# Patient Record
Sex: FEMALE | Race: WHITE | NOT HISPANIC OR LATINO | Employment: FULL TIME | URBAN - METROPOLITAN AREA
[De-identification: names, ages, dates, MRNs, and addresses within clinical notes are randomized per-mention and may not be internally consistent; named-entity substitution may affect disease eponyms.]

---

## 2017-01-19 ENCOUNTER — OFFICE VISIT (OUTPATIENT)
Dept: FAMILY MEDICINE | Facility: CLINIC | Age: 37
End: 2017-01-19
Payer: COMMERCIAL

## 2017-01-19 ENCOUNTER — RADIANT APPOINTMENT (OUTPATIENT)
Dept: GENERAL RADIOLOGY | Facility: CLINIC | Age: 37
End: 2017-01-19
Attending: PHYSICIAN ASSISTANT
Payer: COMMERCIAL

## 2017-01-19 VITALS
TEMPERATURE: 97.8 F | DIASTOLIC BLOOD PRESSURE: 76 MMHG | HEART RATE: 88 BPM | BODY MASS INDEX: 26.03 KG/M2 | WEIGHT: 162 LBS | OXYGEN SATURATION: 96 % | SYSTOLIC BLOOD PRESSURE: 118 MMHG | HEIGHT: 66 IN

## 2017-01-19 DIAGNOSIS — R05.9 COUGH: ICD-10-CM

## 2017-01-19 DIAGNOSIS — R05.9 COUGH: Primary | ICD-10-CM

## 2017-01-19 LAB
FLUAV+FLUBV AG SPEC QL: NORMAL
FLUAV+FLUBV AG SPEC QL: NORMAL
SPECIMEN SOURCE: NORMAL

## 2017-01-19 PROCEDURE — 99213 OFFICE O/P EST LOW 20 MIN: CPT | Performed by: PHYSICIAN ASSISTANT

## 2017-01-19 PROCEDURE — 87804 INFLUENZA ASSAY W/OPTIC: CPT | Performed by: PHYSICIAN ASSISTANT

## 2017-01-19 PROCEDURE — 71020 XR CHEST 2 VW: CPT

## 2017-01-19 RX ORDER — PREDNISONE 20 MG/1
40 TABLET ORAL DAILY
Qty: 10 TABLET | Refills: 0 | Status: SHIPPED | OUTPATIENT
Start: 2017-01-19 | End: 2017-01-24

## 2017-01-19 NOTE — NURSING NOTE
"Chief Complaint   Patient presents with     URI     Cough       Initial /76 mmHg  Pulse 88  Temp(Src) 97.8  F (36.6  C) (Oral)  Ht 5' 6\" (1.676 m)  Wt 162 lb (73.483 kg)  BMI 26.16 kg/m2  SpO2 96% Estimated body mass index is 26.16 kg/(m^2) as calculated from the following:    Height as of this encounter: 5' 6\" (1.676 m).    Weight as of this encounter: 162 lb (73.483 kg).  BP completed using cuff size: derian Escobar CMA      "

## 2017-01-19 NOTE — PROGRESS NOTES
"  SUBJECTIVE:                                                    Lashonda Rodriguez is a 37 year old female who presents to clinic today for the following health issues:      ENT Symptoms             Symptoms: cc Present Absent Comment   Fever/Chills  x  Chills, no fever.    Fatigue  x     Muscle Aches  x     Eye Irritation  x     Sneezing  x     Nasal Levi/Drg  x     Sinus Pressure/Pain  x     Loss of smell  x     Dental pain   x    Sore Throat  x  Started with that, improving.    Swollen Glands   x    Ear Pain/Fullness   x    Cough  x     Wheeze  x     Chest Pain   x    Shortness of breath  x     Rash   x    Other   x      Symptom duration:  5 days    Symptom severity:  moderate    Treatments tried:  mucinex -helping the cough   Contacts:  family      It was bad Monday and Tuesday. It feels like it is moving to her sinuses and lungs.       Problem list and histories reviewed & adjusted, as indicated.  Additional history: as documented    Problem list, Medication list, Allergies, and Medical/Social/Surgical histories reviewed in EPIC and updated as appropriate.    ROS:  Constitutional, HEENT, cardiovascular, pulmonary, gi and gu systems are negative, except as otherwise noted.    OBJECTIVE:                                                    /76 mmHg  Pulse 88  Temp(Src) 97.8  F (36.6  C) (Oral)  Ht 5' 6\" (1.676 m)  Wt 162 lb (73.483 kg)  BMI 26.16 kg/m2  SpO2 96%  Body mass index is 26.16 kg/(m^2).  GENERAL: healthy, alert and no distress  HENT: ear canals and TM's normal, but fluid bilaterally, nose and mouth without ulcers or lesions  NECK: no adenopathy, no asymmetry, masses, or scars and thyroid normal to palpation  RESP: faint expiratory wheezing in the bilateral lower lobes.   CV: regular rate and rhythm, normal S1 S2, no S3 or S4, no murmur, click or rub, no peripheral edema and peripheral pulses strong  MS: no gross musculoskeletal defects noted, no edema    Diagnostic Test Results:  Results " for orders placed or performed in visit on 01/19/17   Influenza A/B antigen   Result Value Ref Range    Influenza A/B Agn Specimen Nasal     Influenza A  NEG     Negative   Test results must be correlated with clinical data. If necessary, results   should be confirmed by a molecular assay or viral culture.      Influenza B  NEG     Negative   Test results must be correlated with clinical data. If necessary, results   should be confirmed by a molecular assay or viral culture.            ASSESSMENT/PLAN:                                                    1. Cough  Discussed viral nature of illness. Will try prednisone to help with cough and wheezing. If not improving in another 5-7 days return to clinic.   - XR Chest 2 Views; Future  - Influenza A/B antigen  - predniSONE (DELTASONE) 20 MG tablet; Take 2 tablets (40 mg) by mouth daily for 5 days  Dispense: 10 tablet; Refill: 0    FUTURE APPOINTMENTS:       - Follow-up for annual visit or as needed    Farzaneh Quinones PA-C  [facility]

## 2017-01-19 NOTE — MR AVS SNAPSHOT
After Visit Summary   1/19/2017    Lashonda Rodriguez    MRN: [id number]           Patient Information     Date Of Birth          1980        Visit Information        Provider Department      1/19/2017 11:40 AM Farzaneh Quinones PA-C [facility]        Today's Diagnoses     Cough    -  1        Follow-ups after your visit        Your next 10 appointments already scheduled     Mar 24, 2017  1:10 PM   Adult Med Follow UP with Renay Hernandez MD   Psychiatry Clinic ([facility])    [address]  [address] [facility] [id number] [phone]              Who to contact     If you have questions or need follow up information about today's clinic visit or your schedule please contact [facility] directly at [phone].  Normal or non-critical lab and imaging results will be communicated to you by MyChart, letter or phone within 4 business days after the clinic has received the results. If you do not hear from us within 7 days, please contact the clinic through MyChart or phone. If you have a critical or abnormal lab result, we will notify you by phone as soon as possible.  Submit refill requests through [employer] or call your pharmacy and they will forward the refill request to us. Please allow 3 business days for your refill to be completed.          Additional Information About Your Visit        MyChart Information     [employer] gives you secure access to your electronic health record. If you see a primary care provider, you can also send messages to your care team and make appointments. If you have questions, please call your primary care clinic.  If you do not have a primary care provider, please call [phone] and they will assist you.        Care EveryWhere ID     This is your Care EveryWhere ID. This could be used by other organizations to access your [facility] medical records  [id number]        Your  "Vitals Were     Pulse Temperature Height BMI (Body Mass Index) Pulse Oximetry       88 97.8  F (36.6  C) (Oral) 5' 6\" (1.676 m) 26.16 kg/m2 96%        Blood Pressure from Last 3 Encounters:   01/19/17 118/76   12/16/16 112/74   09/14/16 117/70    Weight from Last 3 Encounters:   01/19/17 162 lb (73.483 kg)   12/16/16 161 lb 6.4 oz (73.211 kg)   09/14/16 163 lb 3.2 oz (74.027 kg)              We Performed the Following     Influenza A/B antigen          Today's Medication Changes          These changes are accurate as of: 1/19/17 12:22 PM.  If you have any questions, ask your nurse or doctor.               Start taking these medicines.        Dose/Directions    predniSONE 20 MG tablet   Commonly known as:  DELTASONE   Used for:  Cough   Started by:  Farzaneh Quinones PA-C        Dose:  40 mg   Take 2 tablets (40 mg) by mouth daily for 5 days   Quantity:  10 tablet   Refills:  0         These medicines have changed or have updated prescriptions.        Dose/Directions    caffeine 200 MG Tabs tablet   Commonly known as:  NO-DOZE   This may have changed:  additional instructions   Used for:  Major depressive disorder, recurrent episode (H)        Dose:  200 mg   Take 1 tablet (200 mg) by mouth 2 times daily   Quantity:  150 tablet   Refills:  0            Where to get your medicines      These medications were sent to [facility] [address] [facility] - [place] [facility], [place] - [address]  [address] [place] [address]     Phone:  [phone]    - predniSONE 20 MG tablet             Primary Care Provider Office Phone # Fax #    AYSE Hamilton [phone]       [facility] [address]  [facility] [address]        Thank you!     Thank you for choosing [facility]  for your care. Our goal is always to provide you with excellent care. Hearing back from our patients is one way we can continue to improve our services. Please take a few minutes to complete " the written survey that you may receive in the mail after your visit with us. Thank you!             Your Updated Medication List - Protect others around you: Learn how to safely use, store and throw away your medicines at www.disposemymeds.org.          This list is accurate as of: 1/19/17 12:22 PM.  Always use your most recent med list.                   Brand Name Dispense Instructions for use    * amphetamine-dextroamphetamine 20 MG per tablet    ADDERALL    90 tablet    Take 2 tabs in the morning, 1 tab at noon.       * amphetamine-dextroamphetamine 20 MG per tablet    ADDERALL    90 tablet    Take 2 tabs in the morning, 1 tab at noon.       * amphetamine-dextroamphetamine 20 MG per tablet    ADDERALL    90 tablet    Take 2 tabs in the morning, 1 tab at noon.       caffeine 200 MG Tabs tablet    NO-DOZE    150 tablet    Take 1 tablet (200 mg) by mouth 2 times daily       fluocin-hydroquinone-tretinoin 0.01-4-0.05 % Crea     30 g    Externally apply topically daily To the face       hydroquinone 4 % Crea     30 g    Externally apply topically daily In the morning       ibuprofen 200 MG capsule      take 200-400 mg by mouth every 6 hours as needed.       lamoTRIgine 150 MG tablet   Start taking on:  2/6/2017    LaMICtal    90 tablet    Take 1 tablet (150 mg) by mouth daily       melatonin 5 MG tablet      Take 10 mg by mouth nightly as needed       predniSONE 20 MG tablet    DELTASONE    10 tablet    Take 2 tablets (40 mg) by mouth daily for 5 days       TYLENOL 500 MG tablet   Generic drug:  acetaminophen      take 1-2 Tabs by mouth every 6 hours as needed.       ZYRTEC ALLERGY 10 MG tablet   Generic drug:  cetirizine      Take 10 mg by mouth daily       * Notice:  This list has 3 medication(s) that are the same as other medications prescribed for you. Read the directions carefully, and ask your doctor or other care provider to review them with you.

## 2017-03-24 ENCOUNTER — OFFICE VISIT (OUTPATIENT)
Dept: PSYCHIATRY | Facility: CLINIC | Age: 37
End: 2017-03-24
Attending: PSYCHIATRY & NEUROLOGY
Payer: COMMERCIAL

## 2017-03-24 VITALS
BODY MASS INDEX: 25.63 KG/M2 | DIASTOLIC BLOOD PRESSURE: 62 MMHG | WEIGHT: 158.8 LBS | SYSTOLIC BLOOD PRESSURE: 109 MMHG | HEART RATE: 75 BPM

## 2017-03-24 DIAGNOSIS — G47.419 PRIMARY NARCOLEPSY WITHOUT CATAPLEXY: ICD-10-CM

## 2017-03-24 DIAGNOSIS — F33.42 MAJOR DEPRESSIVE DISORDER, RECURRENT EPISODE, IN FULL REMISSION (H): ICD-10-CM

## 2017-03-24 PROCEDURE — 99212 OFFICE O/P EST SF 10 MIN: CPT | Mod: ZF

## 2017-03-24 RX ORDER — LAMOTRIGINE 150 MG/1
150 TABLET ORAL DAILY
Qty: 90 TABLET | Refills: 1 | Status: SHIPPED | OUTPATIENT
Start: 2017-03-24 | End: 2017-07-03

## 2017-03-24 RX ORDER — DEXTROAMPHETAMINE SACCHARATE, AMPHETAMINE ASPARTATE, DEXTROAMPHETAMINE SULFATE AND AMPHETAMINE SULFATE 5; 5; 5; 5 MG/1; MG/1; MG/1; MG/1
TABLET ORAL
Qty: 90 TABLET | Refills: 0 | Status: SHIPPED | OUTPATIENT
Start: 2017-04-20 | End: 2017-07-03

## 2017-03-24 RX ORDER — DEXTROAMPHETAMINE SACCHARATE, AMPHETAMINE ASPARTATE, DEXTROAMPHETAMINE SULFATE AND AMPHETAMINE SULFATE 5; 5; 5; 5 MG/1; MG/1; MG/1; MG/1
TABLET ORAL
Qty: 90 TABLET | Refills: 0 | Status: SHIPPED | OUTPATIENT
Start: 2017-05-16 | End: 2017-07-03

## 2017-03-24 RX ORDER — DEXTROAMPHETAMINE SACCHARATE, AMPHETAMINE ASPARTATE, DEXTROAMPHETAMINE SULFATE AND AMPHETAMINE SULFATE 5; 5; 5; 5 MG/1; MG/1; MG/1; MG/1
TABLET ORAL
Qty: 90 TABLET | Refills: 0 | Status: SHIPPED | OUTPATIENT
Start: 2017-03-24 | End: 2017-07-03

## 2017-03-24 NOTE — PROGRESS NOTES
PSYCHIATRY CLINIC PROGRESS NOTE   30 minute medication management for ongoing management of major depressive disorder, skin picking disorder and narcolepsy.  The initial DIAG EVAL was 5/11/10.  Date of most recent Transfer Evaluation is 09/14/2016.    INTERIM HISTORY                                                     PSYCH CRITICAL ITEM HISTORY includes none.      Lashonda Rodriguez is a 37 year old female who was last seen in clinic on 12/16/16 at which time no changes were made.  The patient reports good treatment adherence.  History was provided by patient who was a good historian.    Since the last visit:  - Things are going well.  - Work is good.  - Doesn't get enough sleep because wants to stay up after children go to bed. Typically goes to bed around midnight. Sleeps fine once she's in bed. Finds melatonin to be helpful.   - Reviewed that Adderall is for narcolepsy. Does not follow with Sleep Medicine following diagnosis.    RECENT SYMPTOMS:   DEPRESSION:  low energy;  DENIES- suicidal ideation   COMPULSIVE: skin picking under control, not interfering  EATING DISORDER: none     SUBSTANCE USE:     ALCOHOL-  3 drink(s) per week         TOBACCO- none          CAFFEINE- 600 mg caffeine/day via caffeine pills                      CANNABIS- none  OTHER ILLICIT DRUGS- none    Financial Support- working       Living Situation- Lives with             Children- 2 girls (12 and 14)       MEDICAL ROS:  Reports dry mouth.      PSYCH and CD Critical Summary Points since July 2016           None    PAST PSYCH MED TRIALS   see EMR Problem List: Hx of psychiatric care    MEDICAL / SURGICAL HISTORY                                   CARE TEAM:          PCP- None                    Therapist- None    Pregnant or breastfeeding:  NO      Contraception- IUD    Patient Active Problem List   Diagnosis     Major depressive disorder, recurrent episode (H)     Narcolepsy     Bladder spasms     Exercise-induced asthma      CARDIOVASCULAR SCREENING; LDL GOAL LESS THAN 160     Impulse control disorder     Attention deficit disorder     Hx of psychiatric care       ALLERGY                                Wellbutrin [bupropion] and Penicillins  MEDICATIONS                               Current Outpatient Prescriptions   Medication Sig Dispense Refill     lamoTRIgine (LAMICTAL) 150 MG tablet Take 1 tablet (150 mg) by mouth daily 90 tablet 1     amphetamine-dextroamphetamine (ADDERALL) 20 MG per tablet Take 2 tabs in the morning, 1 tab at noon. 90 tablet 0     amphetamine-dextroamphetamine (ADDERALL) 20 MG per tablet Take 2 tabs in the morning, 1 tab at noon. 90 tablet 0     amphetamine-dextroamphetamine (ADDERALL) 20 MG per tablet Take 2 tabs in the morning, 1 tab at noon. 90 tablet 0     fluocin-hydroquinone-tretinoin 0.01-4-0.05 % CREA Externally apply topically daily To the face 30 g 2     hydroquinone 4 % CREA Externally apply topically daily In the morning 30 g 2     melatonin 5 MG tablet Take 10 mg by mouth nightly as needed        caffeine (NO-DOZE) 200 MG TABS Take 1 tablet (200 mg) by mouth 2 times daily (Patient taking differently: Take 200 mg by mouth 2 times daily 400 mg qam and 200 qnoon) 150 tablet      cetirizine (ZYRTEC ALLERGY) 10 MG tablet Take 10 mg by mouth daily       acetaminophen (TYLENOL) 500 MG tablet take 1-2 Tabs by mouth every 6 hours as needed.       Ibuprofen 200 MG capsule take 200-400 mg by mouth every 6 hours as needed.         VITALS   /62  Pulse 75  Wt 72 kg (158 lb 12.8 oz)  BMI 25.63 kg/m2   MENTAL STATUS EXAM                                                             Alertness: alert  and oriented  Appearance: adequately groomed  Behavior/Demeanor: cooperative, pleasant and calm, with good  eye contact  Speech: normal  Language: intact  Psychomotor: normal or unremarkable  Mood:  description consistent with euthymia  Affect: full range; was congruent to mood; was congruent to content  Thought  Process/Associations: unremarkable  Thought Content:  Denies suicidal ideation, violent ideation and psychotic thought  Perception:  Denies hallucinations  Insight: good  Judgment: good  Cognition:  does appear grossly intact; formal cognitive testing was not done    LABS and DATA     PHQ9 TODAY = 2  PHQ-9 SCORE 5/31/2016 9/14/2016 12/16/2016   Total Score - - -   Total Score 2 1 0       PSYCHIATRIC DIAGNOSES                                                                                                   No diagnosis found.    ASSESSMENT                                     Pertinent Background:   See most recent Transfer Evaluation as dated above.  Additionally, per prior provider: Long standing history of skin picking (since age 19), never been completely gone, but has better and worse times, seems to be helped with Lamictal. Some mild depression off and on, but first major depressive episode happened in 2010 after discontinuing Wellbutrin after having 2 seizures while on Wellbutrin and Adderall. Narcolepsy diagnosed in 2007, treated with Adderall and Caffeine. Adderall originally prescribed in 2002.      Skin picking treated with CBT (Dr. Garduno) with good success, she still knows these skills. Possible options for future if needed - could retry amitriptyline if depression comes back, or could attempt venlafaxine. She also has a h/o narcolepsy diagnosed by a sleep study in 2007 which has been managed for many years on caffeine and adderall. Transferred care to Dr. Dennison for narcolepsy. Adderall has not worsened skin-picking, no evidence of abuse.      TODAY: Lashonda reports stable mood and skin picking behaviors. Reports fatigue due to limited hours of sleep from staying up late. Discussed that a PCP could likely prescribe all of her current medication given ongoing stability but she doesn't have a PCP currently. Given stability and no history of concern for abuse, will continue medications at current doses.                CONTROLLED SUBSTANCE STATEMENT:  The use of Adderall (amphetamine salts) is indicated and appropriate.  This regimen is both beneficial and well tolerated with no adverse effects or tolerance.  There is no evidence of abuse of this medication or other substances.  Warnings related to abuse potential, street value, adverse effects, abrupt cessation, withdrawal and need for emergent care have been discussed and are understood by the patient.  The patient has verbalized clear understanding of safety issues as well as the need to control use.  Plan to continue use at this time. Controlled Substance Contract was completed by Dr. Gonzales on 06/05/15.    PSYCHOTROPIC DRUG INTERACTIONS:   - LAMOTRIGINE and ACETAMINOPHEN may result in decreased lamotrigine effectiveness.   MANAGEMENT:  Monitoring for adverse effects and patient is aware of risks     PLAN                                                                                                       1) PSYCHOTROPIC MEDICATIONS:   - Continue Lamictal 150 mg daily   - Continue Adderall 40 mg QAM and 20 mg Qnoon (provided with Rx x3)    2) THERAPY:  None current    3) LABS NEXT DUE:  N/A       RATING SCALES:    none    4) REFERRALS [CD, medical, other]:  yes, discussed establishing with PCP    5) :  none    6) RTC: 3 months with Farzaneh Hicks    7) CRISIS NUMBERS:    Provided routinely in AVS    TREATMENT RISK STATEMENT:  The risks, benefits, alternatives and potential adverse effects have been discussed and are understood by the patient/ patient's guardian. The pt understands the risks of using street drugs or alcohol.  There are no medical contraindications, the pt agrees to treatment with the ability to do so.  The patient understands to call 911 or come to the nearest ED if life threatening or urgent symptoms present.     RESIDENT:   Renay Hernandez MD    Patient not staffed in clinic.  Note will be reviewed and signed by supervisor   Rodrigue.    I did not see this patient directly.  I have reviewed the resident's documentation and agree with the plan of care.    Lesly Husain MD

## 2017-03-24 NOTE — Clinical Note
I mentioned this to you a while ago. You were fine with it. She came back with me this time but scheduled with you 3 months out. I think she could easily go with q6 mo follow ups. Isabel odd med regimen but stable.

## 2017-03-24 NOTE — MR AVS SNAPSHOT
After Visit Summary   3/24/2017    Lashonda Rodriguez    MRN: [id number]           Patient Information     Date Of Birth          1980        Visit Information        Provider Department      3/24/2017 1:10 PM Renay Hernandez MD Psychiatry Clinic        Today's Diagnoses     Major depressive disorder, recurrent episode, in full remission (H)        Primary narcolepsy 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           Follow-ups after your visit        Follow-up notes from your care team     Return in about 3 months (around 6/24/2017) for with Farzaneh iHcks.      Your next 10 appointments already scheduled     Jun 28, 2017  1:00 PM CDT   Adult Med Follow UP with AYSE Cuello CNP   Psychiatry Clinic ([facility])    [place] [id number] [facility] [address] [phone]              Who to contact     Please call your clinic at [phone] to:    Ask questions about your health    Make or cancel appointments    Discuss your medicines    Learn about your test results    Speak to your doctor   If you have compliments or concerns about an experience at your clinic, or if you wish to file a complaint, please contact [facility] Patient Relations at [phone] or email us at [email or web address]         Additional Information About Your Visit        MyChart Information     [employer] gives you secure access to your electronic health record. If you see a primary care provider, you can also send messages to your care team and make appointments. If you have questions, please call your primary care clinic.  If you do not have a primary care provider, please call [phone] and they will assist you.      [employer] is an electronic gateway that provides easy, online access to your medical records. With [employer], you can request a clinic appointment, read your test results, renew a prescription or communicate with your care team.      To access your existing account, please contact your [facility] or call [phone] for assistance.        Care EveryWhere ID     This is your Care EveryWhere ID. This could be used by other organizations to access your [facility] medical records  [id number]        Your Vitals Were     Pulse BMI (Body Mass Index)                75 25.63 kg/m2           Blood Pressure from Last 3 Encounters:   03/24/17 109/62   01/19/17 118/76   12/16/16 112/74    Weight from Last 3 Encounters:   03/24/17 72 kg (158 lb 12.8 oz)   01/19/17 73.5 kg (162 lb)   12/16/16 73.2 kg (161 lb 6.4 oz)              Today, you had the following     No orders found for display         Today's Medication Changes          These changes are accurate as of: 3/24/17  3:48 PM.  If you have any questions, ask your nurse or doctor.               These medicines have changed or have updated prescriptions.        Dose/Directions    caffeine 200 MG Tabs tablet   Commonly known as:  NO-DOZE   This may have changed:  additional instructions   Used for:  Major depressive disorder, recurrent episode (H)        Dose:  200 mg   Take 1 tablet (200 mg) by mouth 2 times daily   Quantity:  150 tablet   Refills:  0            Where to get your medicines      These medications were sent to [place] [id number] [facility] - [place], [place] - [address]  [address] [place] [facility] [address]     Phone:  [phone]     lamoTRIgine 150 MG tablet         Some of these will need a paper prescription and others can be bought over the counter.  Ask your nurse if you have questions.     Bring a paper prescription for each of these medications     amphetamine-dextroamphetamine 20 MG per tablet    amphetamine-dextroamphetamine 20 MG per tablet    amphetamine-dextroamphetamine 20 MG per tablet                Primary Care Provider Office Phone # Fax #    AYSE Hamilton [phone]       [place] [address]  [address]  [facility] [address]        Thank you!     Thank you for choosing PSYCHIATRY CLINIC  for your care. Our goal is always to provide you with excellent care. Hearing back from our patients is one way we can continue to improve our services. Please take a few minutes to complete the written survey that you may receive in the mail after your visit with us. Thank you!             Your Updated Medication List - Protect others around you: Learn how to safely use, store and throw away your medicines at www.disposemymeds.org.          This list is accurate as of: 3/24/17  3:48 PM.  Always use your most recent med list.                   Brand Name Dispense Instructions for use    * amphetamine-dextroamphetamine 20 MG per tablet    ADDERALL    90 tablet    Take 2 tabs in the morning, 1 tab at noon.       * amphetamine-dextroamphetamine 20 MG per tablet   Start taking on:  4/20/2017    ADDERALL    90 tablet    Take 2 tabs in the morning, 1 tab at noon.       * amphetamine-dextroamphetamine 20 MG per tablet   Start taking on:  5/16/2017    ADDERALL    90 tablet    Take 2 tabs in the morning, 1 tab at noon.       caffeine 200 MG Tabs tablet    NO-DOZE    150 tablet    Take 1 tablet (200 mg) by mouth 2 times daily       fluocin-hydroquinone-tretinoin 0.01-4-0.05 % Crea     30 g    Externally apply topically daily To the face       hydroquinone 4 % Crea     30 g    Externally apply topically daily In the morning       ibuprofen 200 MG capsule      take 200-400 mg by mouth every 6 hours as needed.       lamoTRIgine 150 MG tablet    LaMICtal    90 tablet    Take 1 tablet (150 mg) by mouth daily       melatonin 5 MG tablet      Take 10 mg by mouth nightly as needed       TYLENOL 500 MG tablet   Generic drug:  acetaminophen      take 1-2 Tabs by mouth every 6 hours as needed.       ZYRTEC ALLERGY 10 MG tablet   Generic drug:  cetirizine      Take 10 mg by mouth daily       * Notice:  This list has 3 medication(s) that are  the same as other medications prescribed for you. Read the directions carefully, and ask your doctor or other care provider to review them with you.

## 2017-03-25 ASSESSMENT — PATIENT HEALTH QUESTIONNAIRE - PHQ9: SUM OF ALL RESPONSES TO PHQ QUESTIONS 1-9: 2

## 2017-05-18 ENCOUNTER — TELEPHONE (OUTPATIENT)
Dept: FAMILY MEDICINE | Facility: CLINIC | Age: 37
End: 2017-05-18

## 2017-05-18 DIAGNOSIS — J45.990 EXERCISE-INDUCED ASTHMA: Primary | ICD-10-CM

## 2017-05-18 DIAGNOSIS — F33.0 MILD EPISODE OF RECURRENT MAJOR DEPRESSIVE DISORDER (H): ICD-10-CM

## 2017-05-18 NOTE — TELEPHONE ENCOUNTER
Farzaneh please sign order for DAP and AAP. Please route back to Ninoska Mcgovern after completed.    Thank you

## 2017-05-18 NOTE — TELEPHONE ENCOUNTER
Panel Management Review      Patient has the following on her problem list:     Depression / Dysthymia review  PHQ-9 SCORE 3/9/2015   Total Score 0   Some encounter information is confidential and restricted. Go to Review Flowsheets activity to see all data.      Patient is due for:  DAP    Asthma review     ACT Total Scores 3/9/2015   ACT TOTAL SCORE 25   ASTHMA ER VISITS 0 = None   ASTHMA HOSPITALIZATIONS 0 = None      1. Is Asthma diagnosis on the Problem List? Yes    2. Is Asthma listed on Health Maintenance? Yes    3. Patient is due for:  ACT and AAP      Composite cancer screening  Chart review shows that this patient is due/due soon for the following Pap Smear  Summary:    Patient is due/failing the following:   PAP and PHYSICAL    Action needed:   Patient needs office visit for Physical with pap, ACT, AAP, and DAP.    Type of outreach:    Sent letter.    Questions for provider review:    None                                                                                                                                    REY Mcgovern MA       Chart routed to Provider VIRGILIO Gregory .

## 2017-05-18 NOTE — LETTER
My Asthma Action Plan  Name: Lashonda Rodriguez   YOB: 1980  Date: 5/22/2017   My doctor: Farzaneh Quinones PA-C   My clinic: [facility]        My Control Medicine: None  My Rescue Medicine: None   My Asthma Severity: exercise induced.   Avoid your asthma triggers: exercise or sports               GREEN ZONE     Good Control    I feel good    No cough or wheeze    Can work, sleep and play without asthma symptoms       Take your asthma control medicine every day.     1. If exercise triggers your asthma, take your rescue medication    15 minutes before exercise or sports, and    During exercise if you have asthma symptoms  2. Spacer to use with inhaler: If you have a spacer, make sure to use it with your inhaler             YELLOW ZONE     Getting Worse  I have ANY of these:    I do not feel good    Cough or wheeze    Chest feels tight    Wake up at night   1. Keep taking your Green Zone medications  2. Start taking your rescue medicine:    every 20 minutes for up to 1 hour. Then every 4 hours for 24-48 hours.  3. If you stay in the Yellow Zone for more than 12-24 hours, contact your doctor.  4. If you do not return to the Green Zone in 12-24 hours or you get worse, start taking your oral steroid medicine if prescribed by your provider.           RED ZONE     Medical Alert - Get Help  I have ANY of these:    I feel awful    Medicine is not helping    Breathing getting harder    Trouble walking or talking    Nose opens wide to breathe       1. Take your rescue medicine NOW  2. If your provider has prescribed an oral steroid medicine, start taking it NOW  3. Call your doctor NOW  4. If you are still in the Red Zone after 20 minutes and you have not reached your doctor:    Take your rescue medicine again and    Call 911 or go to the emergency room right away    See your regular doctor within 2 weeks of an Emergency Room or Urgent Care visit for follow-up treatment.         Electronically signed by: Farzaneh Quinones, May 22, 2017    Annual Reminders:  Meet with Asthma Educator,  Flu Shot in the Fall, consider Pneumonia Vaccination for patients with asthma (aged 19 and older).    Pharmacy: Data Unavailable                    Asthma Triggers  How To Control Things That Make Your Asthma Worse    Triggers are things that make your asthma worse.  Look at the list below to help you find your triggers and what you can do about them.  You can help prevent asthma flare-ups by staying away from your triggers.      Trigger                                                          What you can do   Cigarette Smoke  Tobacco smoke can make asthma worse. Do not allow smoking in your home, car or around you.  Be sure no one smokes at a child s day care or school.  If you smoke, ask your health care provider for ways to help you quit.  Ask family members to quit too.  Ask your health care provider for a referral to Quit Plan to help you quit smoking, or call 7-538-328-PLAN.     Colds, Flu, Bronchitis  These are common triggers of asthma. Wash your hands often.  Don t touch your eyes, nose or mouth.  Get a flu shot every year.     Dust Mites  These are tiny bugs that live in cloth or carpet. They are too small to see. Wash sheets and blankets in hot water every week.   Encase pillows and mattress in dust mite proof covers.  Avoid having carpet if you can. If you have carpet, vacuum weekly.   Use a dust mask and HEPA vacuum.   Pollen and Outdoor Mold  Some people are allergic to trees, grass, or weed pollen, or molds. Try to keep your windows closed.  Limit time out doors when pollen count is high.   Ask you health care provider about taking medicine during allergy season.     Animal Dander  Some people are allergic to skin flakes, urine or saliva from pets with fur or feathers. Keep pets with fur or feathers out of your home.    If you can t keep the pet outdoors, then keep the pet out of your bedroom.   Keep the bedroom door closed.  Keep pets off cloth furniture and away from stuffed toys.     Mice, Rats, and Cockroaches  Some people are allergic to the waste from these pests.   Cover food and garbage.  Clean up spills and food crumbs.  Store grease in the refrigerator.   Keep food out of the bedroom.   Indoor Mold  This can be a trigger if your home has high moisture. Fix leaking faucets, pipes, or other sources of water.   Clean moldy surfaces.  Dehumidify basement if it is damp and smelly.   Smoke, Strong Odors, and Sprays  These can reduce air quality. Stay away from strong odors and sprays, such as perfume, powder, hair spray, paints, smoke incense, paint, cleaning products, candles and new carpet.   Exercise or Sports  Some people with asthma have this trigger. Be active!  Ask your doctor about taking medicine before sports or exercise to prevent symptoms.    Warm up for 5-10 minutes before and after sports or exercise.     Other Triggers of Asthma  Cold air:  Cover your nose and mouth with a scarf.  Sometimes laughing or crying can be a trigger.  Some medicines and food can trigger asthma.

## 2017-05-18 NOTE — LETTER
May 18, 2017    Lashonda Rodriguez  [address]  [facility] [place] [place] [address]    Dear Lashonda    We care about your health and have reviewed your health plan. We have reviewed your medical conditions, medication list, and lab results and are making recommendations based on this review, to better manage your health.    You are in particular need of attention regarding:  - Your Asthma  - Your Depression  - Scheduling a Physical with a Cervical Cancer Screening (Pap Smear) age 64 and younger [phone]      Here is a list of Health Maintenance topics that are due now or due soon:  Health Maintenance Due   Topic Date Due     PAP SCREENING Q3 YR (SYSTEM ASSIGNED)  01/17/2002     ASTHMA CONTROL TEST Q6 MOS (NO INBASKET)  09/12/2015     ASTHMA ACTION PLAN Q1 YR (NO INBASKET)  03/10/2016     DEPRESSION ACTION PLAN Q1 YR (NO INBASKET)  03/10/2016     We will be calling you in the next couple of weeks to help you schedule any appointments that are needed.  Please call us at [phone] (or use [employer]) to address the above recommendations.     Thank you for trusting [facility] and we appreciate the opportunity to serve you.  We look forward to supporting your healthcare needs in the future.    Healthy Regards,    Your Health Care Team    ALG/MAL

## 2017-05-18 NOTE — LETTER
My Depression Action Plan  Name: Lashonda Rodriguez   Date of Birth 1980  Date: 5/18/2017    My doctor: Selma Alba   My clinic: [address] [id number] [phone]          GREEN    ZONE   Good Control    What it looks like:     Things are going generally well. You have normal up s and down s. You may even feel depressed from time to time, but bad moods usually last less than a day.   What you need to do:  1. Continue to care for yourself (see self care plan)  2. Check your depression survival kit and update it as needed  3. Follow your physician s recommendations including any medication.  4. Do not stop taking medication unless you consult with your physician first.           YELLOW         ZONE Getting Worse    What it looks like:     Depression is starting to interfere with your life.     It may be hard to get out of bed; you may be starting to isolate yourself from others.    Symptoms of depression are starting to last most all day and this has happened for several days.     You may have suicidal thoughts but they are not constant.   What you need to do:     1. Call your care team, your response to treatment will improve if you keep your care team informed of your progress. Yellow periods are signs an adjustment may need to be made.     2. Continue your self-care, even if you have to fake it!    3. Talk to someone in your support network    4. Open up your depression survival kit           RED    ZONE Medical Alert - Get Help    What it looks like:     Depression is seriously interfering with your life.     You may experience these or other symptoms: You can t get out of bed most days, can t work or engage in other necessary activities, you have trouble taking care of basic hygiene, or basic responsibilities, thoughts of suicide or death that will not go away, self-injurious behavior.     What you need to  do:  1. Call your care team and request a same-day appointment. If they are not available (weekends or after hours) call your local crisis line, emergency room or 911.      Electronically signed by: Ninoska Mcgovern, May 18, 2017    Depression Self Care Plan / Survival Kit    Self-Care for Depression  Here s the deal. Your body and mind are really not as separate as most people think.  What you do and think affects how you feel and how you feel influences what you do and think. This means if you do things that people who feel good do, it will help you feel better.  Sometimes this is all it takes.  There is also a place for medication and therapy depending on how severe your depression is, so be sure to consult with your medical provider and/ or Behavioral Health Consultant if your symptoms are worsening or not improving.     In order to better manage my stress, I will:    Exercise  Get some form of exercise, every day. This will help reduce pain and release endorphins, the  feel good  chemicals in your brain. This is almost as good as taking antidepressants!  This is not the same as joining a gym and then never going! (they count on that by the way ) It can be as simple as just going for a walk or doing some gardening, anything that will get you moving.      Hygiene   Maintain good hygiene (Get out of bed in the morning, Make your bed, Brush your teeth, Take a shower, and Get dressed like you were going to work, even if you are unemployed).  If your clothes don't fit try to get ones that do.    Diet  I will strive to eat foods that are good for me, drink plenty of water, and avoid excessive sugar, caffeine, alcohol, and other mood-altering substances.  Some foods that are helpful in depression are: complex carbohydrates, B vitamins, flaxseed, fish or fish oil, fresh fruits and vegetables.    Psychotherapy  I agree to participate in Individual Therapy (if recommended).    Medication  If prescribed medications, I agree  to take them.  Missing doses can result in serious side effects.  I understand that drinking alcohol, or other illicit drug use, may cause potential side effects.  I will not stop my medication abruptly without first discussing it with my provider.    Staying Connected With Others  I will stay in touch with my friends, family members, and my primary care provider/team.    Use your imagination  Be creative.  We all have a creative side; it doesn t matter if it s oil painting, sand castles, or mud pies! This will also kick up the endorphins.    Witness Beauty  (AKA stop and smell the roses) Take a look outside, even in mid-winter. Notice colors, textures. Watch the squirrels and birds.     Service to others  Be of service to others.  There is always someone else in need.  By helping others we can  get out of ourselves  and remember the really important things.  This also provides opportunities for practicing all the other parts of the program.    Humor  Laugh and be silly!  Adjust your TV habits for less news and crime-drama and more comedy.    Control your stress  Try breathing deep, massage therapy, biofeedback, and meditation. Find time to relax each day.     My support system    Clinic Contact:  Phone number:    Contact 1:  Phone number:    Contact 2:  Phone number:    Shinto/:  Phone number:    Therapist:  Phone number:    Local crisis center:    Phone number:    Other community support:  Phone number:

## 2017-07-03 ENCOUNTER — OFFICE VISIT (OUTPATIENT)
Dept: PSYCHIATRY | Facility: CLINIC | Age: 37
End: 2017-07-03
Attending: NURSE PRACTITIONER
Payer: COMMERCIAL

## 2017-07-03 DIAGNOSIS — G47.419 PRIMARY NARCOLEPSY WITHOUT CATAPLEXY: ICD-10-CM

## 2017-07-03 DIAGNOSIS — F33.42 MAJOR DEPRESSIVE DISORDER, RECURRENT EPISODE, IN FULL REMISSION (H): ICD-10-CM

## 2017-07-03 RX ORDER — LAMOTRIGINE 150 MG/1
150 TABLET ORAL DAILY
Qty: 90 TABLET | Refills: 0 | Status: SHIPPED | OUTPATIENT
Start: 2017-07-03 | End: 2017-10-05

## 2017-07-03 RX ORDER — DEXTROAMPHETAMINE SACCHARATE, AMPHETAMINE ASPARTATE, DEXTROAMPHETAMINE SULFATE AND AMPHETAMINE SULFATE 5; 5; 5; 5 MG/1; MG/1; MG/1; MG/1
TABLET ORAL
Qty: 90 TABLET | Refills: 0 | Status: SHIPPED | OUTPATIENT
Start: 2017-07-03 | End: 2017-10-05

## 2017-07-03 NOTE — MR AVS SNAPSHOT
After Visit Summary   7/3/2017    Lashonda Rodriguez    MRN: [id number]           Patient Information     Date Of Birth          1980        Visit Information        Provider Department      7/3/2017 3:30 PM Farzaneh Hicks APRN CNP Psychiatry Clinic        Today's Diagnoses     Major depressive disorder, recurrent episode, in full remission (H)        Primary narcolepsy 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           Follow-ups after your visit        Follow-up notes from your care team     Return in about 12 weeks (around 9/25/2017), or if symptoms worsen or fail to improve.      Your next 10 appointments already scheduled     Oct 05, 2017 11:00 AM CDT   Adult Med Follow UP with AYSE Cuello CNP   Psychiatry Clinic ([facility])    [address] [facility] [address] [phone]              Who to contact     Please call your clinic at [phone] to:    Ask questions about your health    Make or cancel appointments    Discuss your medicines    Learn about your test results    Speak to your doctor   If you have compliments or concerns about an experience at your clinic, or if you wish to file a complaint, please contact [facility] Patient Relations at [phone] or email us at [email or web address]         Additional Information About Your Visit        MyChart Information     [employer] gives you secure access to your electronic health record. If you see a primary care provider, you can also send messages to your care team and make appointments. If you have questions, please call your primary care clinic.  If you do not have a primary care provider, please call [phone] and they will assist you.      [employer] is an electronic gateway that provides easy, online access to your medical records. With [employer], you can request a clinic appointment, read your test results, renew a prescription or communicate  with your care team.     To access your existing account, please contact your [facility] or call [phone] for assistance.        Care EveryWhere ID     This is your Care EveryWhere ID. This could be used by other organizations to access your [facility] medical records  [id number]         Blood Pressure from Last 3 Encounters:   03/24/17 109/62   01/19/17 118/76   12/16/16 112/74    Weight from Last 3 Encounters:   03/24/17 72 kg (158 lb 12.8 oz)   01/19/17 73.5 kg (162 lb)   12/16/16 73.2 kg (161 lb 6.4 oz)              Today, you had the following     No orders found for display         Today's Medication Changes          These changes are accurate as of: 7/3/17  4:56 PM.  If you have any questions, ask your nurse or doctor.               These medicines have changed or have updated prescriptions.        Dose/Directions    caffeine 200 MG Tabs tablet   Commonly known as:  NO-DOZE   This may have changed:  additional instructions   Used for:  Major depressive disorder, recurrent episode (H)        Dose:  200 mg   Take 1 tablet (200 mg) by mouth 2 times daily   Quantity:  150 tablet   Refills:  0            Where to get your medicines      These medications were sent to [facility] [address] [facility] - [place], [place] - [address]  [address] [place] [address]     Phone:  [phone]     lamoTRIgine 150 MG tablet         Some of these will need a paper prescription and others can be bought over the counter.  Ask your nurse if you have questions.     Bring a paper prescription for each of these medications     amphetamine-dextroamphetamine 20 MG per tablet    amphetamine-dextroamphetamine 20 MG per tablet    amphetamine-dextroamphetamine 20 MG per tablet                Primary Care Provider Office Phone # Fax #    AYSE Hamilton [phone]       [facility] [address]  [facility] [address]        Equal Access to Services      WILLIE CADET : Hadii aad ku puma Soearleali, waaxda luqadaha, qaybta kaalmada adejonelle, saida tachoin hayaalaurel florentinowilman han bart . So [facility] [phone].    ATENCIÓN: Si habla cyndi, tiene a sarkar disposición servicios gratuitos de asistencia lingüística. Miguelame al [phone].    We comply with applicable federal civil rights laws and [place] laws. We do not discriminate on the basis of race, color, national origin, age, disability sex, sexual orientation or gender identity.            Thank you!     Thank you for choosing PS[facility]ATRY CLINIC  for your care. Our goal is always to provide you with excellent care. Hearing back from our patients is one way we can continue to improve our services. Please take a few minutes to complete the written survey that you may receive in the mail after your visit with us. Thank you!             Your Updated Medication List - Protect others around you: Learn how to safely use, store and throw away your medicines at www.disposemymeds.org.          This list is accurate as of: 7/3/17  4:56 PM.  Always use your most recent med list.                   Brand Name Dispense Instructions for use Diagnosis    * amphetamine-dextroamphetamine 20 MG per tablet    ADDERALL    90 tablet    Take 2 tabs in the morning, 1 tab at noon.    Primary narcolepsy 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       * amphetamine-dextroamphetamine 20 MG per tablet    ADDERALL    90 tablet    Take 2 tabs in the morning, 1 tab at noon.    Primary narcolepsy 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       * amphetamine-dextroamphetamine 20 MG per tablet    ADDERALL    90 tablet    Take 2 tabs in the morning, 1 tab at noon.    Primary narcolepsy 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       caffeine 200 MG Tabs tablet    NO-DOZE    150 tablet    Take 1 tablet (200 mg) by mouth 2 times daily    Major depressive disorder, recurrent episode (H)       fluocin-hydroquinone-tretinoin 0.01-4-0.05 % Crea     30 g    Externally apply topically daily To the face    Melasma        hydroquinone 4 % Crea     30 g    Externally apply topically daily In the morning    Melasma       ibuprofen 200 MG capsule      take 200-400 mg by mouth every 6 hours as needed.        lamoTRIgine 150 MG tablet    LaMICtal    90 tablet    Take 1 tablet (150 mg) by mouth daily    Major depressive disorder, recurrent episode, in full remission (H)       melatonin 5 MG tablet      Take 10 mg by mouth nightly as needed        TYLENOL 500 MG tablet   Generic drug:  acetaminophen      take 1-2 Tabs by mouth every 6 hours as needed.        ZYRTEC ALLERGY 10 MG tablet   Generic drug:  cetirizine      Take 10 mg by mouth daily        * Notice:  This list has 3 medication(s) that are the same as other medications prescribed for you. Read the directions carefully, and ask your doctor or other care provider to review them with you.

## 2017-07-03 NOTE — PROGRESS NOTES
"  Outpatient Psychiatry Progress Note     Provider: AYSE Dumont CNP  Date: 7/3/2017  Service:  Medication management.   Patient Identification: Lashonda Rodriguez  : 1980   MRN: [id number]    Lashonda Rodriguez is a 37 year old year old female who presents for ongoing psychiatric care.  Lashonda was last seen in clinic on 17. At that time, she chose to continue Adderall and lamotrigine.    Reviewed most transfer eval date 16 and initial psych eval 5/11/10.  Reviewed neurology notes dated 2010 and 2009. No sleep study available.    She prefers to be called Josi.    7/3/2017  Today Lashonda reports her good mood continues; euthymic affect. She is daily compliant with lamotrigine and takes Adderall 40mg qAM and 20mg qNoon for narcolepsy; she takes Adderall with caffeine pills (400mg with am dose and 200mg with noon dose); she denies side effects of medication.     She and her  of 2 years (and partner of 12 years) are raising 14yo and 14yo daughters.    She came to the clinic originally for skin picking; symptoms wax and wane; she has spent upwards of one hour daily since age 19 picking at her upper arms; previous notes report history of picking at chest and back. She feels she's in the best state she's been in for a long time and feels that though the skin picking continues it is less frequent and less intense and no longer preoccupies her time. She did six months of therapy here with Dr. Garduno which improved symptoms about . Stress increases urge to pick.    She was diagnosed with narcolepsy via sleep study about  but recalls symptoms (\"so tired I could not hold a conversation\") since her teen years. She felt her most accurate diagnosis was not ADHD when she knew Adderall didn't touch her inability to organize or pay attention.    She previously trialed Provigil (didn't work very well, trialed about a couple months), Nuvigil 250mg (trialed in -ineffective), " buspar (unknown), Wellbutrin XL (stopped immediately after taking with Adderall which induced two seizures in 2009/2010), amitriptyline 10mg for skin picking (unknown), NAC (ineffective, this likely before she started therapy and may not have participated in titration).    She works full time as a  at [employer], she started there in 2013; she appreciates her new boss that recognizes her ability. 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    Psychiatric Review of Systems:  She denies depression, anxiety, irritability.    She denies lethality.    Review of Medical Systems:  Appetite: intact; weight stable  Sleep: takes melatonin to force herself to go sleep when she'd be inclined to stay awake after her kids go to bed; sleeps 6 hours overnight because she stays awake until 12a; she'd like to sleep 12 hours or more but her family life doesn't allow.    Self Care: encouraged, she enjoys gardening, playing in her band, they practice weekly and plays shows monthly  Housework and hygiene: managing as well as is normal for her    Energy: intact  Concentration: intact    Current Substance Use:  Social History     Social History     Marital status: Single     Spouse name: N/A     Number of children: N/A     Years of education: N/A     Social History Main Topics     Smoking status: Former Smoker     Types: Cigarettes     Quit date: 1/1/2008     Smokeless tobacco: Never Used     Alcohol use Yes      Comment: 3 beer every 2 days/week     Drug use: No     Sexual activity: Yes     Partners: Male     Birth control/ protection: IUD     Other Topics Concern     Not on file     Social History Narrative     Nicotine: denies  Alcohol: limits to 3 beers per week; identifies as a 'beer snob'  Cannabis: none  Other illicits: none  Prescription pills: limits to what she is prescribed  Caffeine: takes caffeine pills to supplement  Adderall    Past Medical History:   Past Medical History:   Diagnosis Date     Anxiety      Bladder spasms      Depression 01/05/2009     Exercise-induced asthma 06/11/2009     Narcolepsy 12/28/2009     Seizure (H) 2 approx 8330-8225    Secondary to Wellbutrin and stimulant use     Medical health update: no updates; is seen at [facility]; she denies bladder spasms and exercise induced asthma    History of general tonic clonic seizure 9/20/2009 without aura with postictal confusion; second seizure while with SO on 1/23/10 (tonic clonic; foaming at the mouth with post ictal confusion and lethargy. Narcolepsy diagnosed about 2004. Remote history of febrile convulsion before [age] yo, her daughter has a history of febrile convulsions.     Allergies:   Allergies   Allergen Reactions     Wellbutrin [Bupropion] Other (See Comments)     Two seizures while on Adderall and Wellbutrin     Penicillins Rash        Current Medications     Current Outpatient Prescriptions Ordered in [facility]   Medication Sig Dispense Refill     lamoTRIgine (LAMICTAL) 150 MG tablet Take 1 tablet (150 mg) by mouth daily 90 tablet 1     amphetamine-dextroamphetamine (ADDERALL) 20 MG per tablet Take 2 tabs in the morning, 1 tab at noon. 90 tablet 0     amphetamine-dextroamphetamine (ADDERALL) 20 MG per tablet Take 2 tabs in the morning, 1 tab at noon. 90 tablet 0     amphetamine-dextroamphetamine (ADDERALL) 20 MG per tablet Take 2 tabs in the morning, 1 tab at noon. 90 tablet 0     fluocin-hydroquinone-tretinoin 0.01-4-0.05 % CREA Externally apply topically daily To the face 30 g 2     hydroquinone 4 % CREA Externally apply topically daily In the morning 30 g 2     melatonin 5 MG tablet Take 10 mg by mouth nightly as needed        caffeine (NO-DOZE) 200 MG TABS Take 1 tablet (200 mg) by mouth 2 times daily (Patient taking differently: Take 200 mg by mouth 2 times daily 400 mg qam and 200 qnoon) 150 tablet      cetirizine (ZYRTEC ALLERGY) 10 MG tablet Take 10 mg  "by mouth daily       acetaminophen (TYLENOL) 500 MG tablet take 1-2 Tabs by mouth every 6 hours as needed.       Ibuprofen 200 MG capsule take 200-400 mg by mouth every 6 hours as needed.       No current Epic-ordered facility-administered medications on file.       Mental Status Exam     Appearance:  Casually dressed and Adequately groomed  Behavior/relationship to examiner/demeanor:  Cooperative, Engaged and Pleasant  Orientation: Oriented to person, place, time and situation  Psychomotor: WNL  Speech Rate:  Normal  Speech Spontaneity:  Normal  Mood:  Pretty good  Affect:  Appropriate/mood-congruent  Thought Process (Associations):  Logical, Linear and Goal directed  Thought Content:  no evidence of suicidal or homicidal ideation, no overt psychosis, denies suicidal ideation, intent or thoughts and patient denies auditory and visual hallucinations  Abnormal Perception:  None  Attention/Concentration:  Fair  Language:  Intact  Insight:  Adequate  Judgment:  Good      Results     Vital signs: There were no vitals taken for this visit. 5'6\", 159.4#, 109/68, 74 HR    Laboratory Data:   Lab Results   Component Value Date    WBC 7.6 09/12/2010    HGB 13.9 09/12/2010    HCT 39.9 09/12/2010     09/12/2010    ALT 20 04/12/2013    AST 18 04/12/2013     (H) 04/12/2013    BUN 9 04/12/2013    CO2 31 04/12/2013    TSH 1.18 04/12/2013     Assessment & Plan      Lashonda Rodriguez is seen today for follow up.    Diagnosis  Axis 1: narcolepsy, MDD, recurrent  Axis 2: none    Plan:  Medication: continue lamotrigine 150mg daily and continue Adderall 40mg qAM and 20mg qNoon  OTC Recommendations: melatonin 15mg nightly PRN, discussed risks  Lab Orders: none  Referrals: none  Release of Information: none  Future Treatment Considerations: consider Elavil, Effexor if skin picking returns  Return for Follow Up: 3 months, sooner as needed    She voices understanding of the treatment plan including discussion of options, " risks/ benefits. Controlled substances contract completed 06/05/2015. She has clinic contact information and will seek emergency services if urgent or life threatening symptoms present. She  understands risks associated with drug and alcohol use.     Over 50% of this time was spent counseling the patient and/or coordinating care regarding review of social and occupational functioning.  In addition patient was counseled on health and wellness practices to augment medication treatment of symptoms. See note for details. PHQ-9 score:    PHQ-9 SCORE 3/24/2017   Total Score 2     GAD7:   JULITA-7 SCORE 3/12/2015   Total Score 1     : 07/2017  Farzaneh Hicks, AYSE CNP 7/3/2017

## 2017-07-05 ENCOUNTER — TELEPHONE (OUTPATIENT)
Dept: FAMILY MEDICINE | Facility: CLINIC | Age: 37
End: 2017-07-05

## 2017-07-05 NOTE — TELEPHONE ENCOUNTER
7/5/2017      Patient goes to [facility] to complete Pap.           Outreach ,  Qasim Pulliam

## 2017-10-05 ENCOUNTER — OFFICE VISIT (OUTPATIENT)
Dept: PSYCHIATRY | Facility: CLINIC | Age: 37
End: 2017-10-05
Attending: NURSE PRACTITIONER
Payer: COMMERCIAL

## 2017-10-05 VITALS
BODY MASS INDEX: 25.37 KG/M2 | SYSTOLIC BLOOD PRESSURE: 110 MMHG | WEIGHT: 157.2 LBS | DIASTOLIC BLOOD PRESSURE: 66 MMHG | HEART RATE: 87 BPM

## 2017-10-05 DIAGNOSIS — F33.42 MAJOR DEPRESSIVE DISORDER, RECURRENT EPISODE, IN FULL REMISSION (H): ICD-10-CM

## 2017-10-05 DIAGNOSIS — G47.419 PRIMARY NARCOLEPSY WITHOUT CATAPLEXY: ICD-10-CM

## 2017-10-05 PROCEDURE — 99212 OFFICE O/P EST SF 10 MIN: CPT | Mod: ZF

## 2017-10-05 RX ORDER — DEXTROAMPHETAMINE SACCHARATE, AMPHETAMINE ASPARTATE, DEXTROAMPHETAMINE SULFATE AND AMPHETAMINE SULFATE 5; 5; 5; 5 MG/1; MG/1; MG/1; MG/1
TABLET ORAL
Qty: 90 TABLET | Refills: 0 | Status: SHIPPED | OUTPATIENT
Start: 2017-10-05 | End: 2018-01-11

## 2017-10-05 RX ORDER — LAMOTRIGINE 150 MG/1
150 TABLET ORAL DAILY
Qty: 90 TABLET | Refills: 0 | Status: SHIPPED | OUTPATIENT
Start: 2017-10-05 | End: 2018-01-11

## 2017-10-05 ASSESSMENT — PATIENT HEALTH QUESTIONNAIRE - PHQ9: SUM OF ALL RESPONSES TO PHQ QUESTIONS 1-9: 3

## 2017-10-05 NOTE — PROGRESS NOTES
Outpatient Psychiatry Progress Note     Provider: AYSE Dumont CNP  Date: 10/5/2017  Service:  Medication management.   Patient Identification: Lashonda Rodriguez  : 1980   MRN: [id number]    Lashonda Rodriguez is a 37 year old female who presents for ongoing psychiatric care.  Josi was last seen in clinic on 7/3/17. At that time, she chose to continue established regimen with Adderall and lamotrigine.    She prefers to be called Josi.    Additional history in notes dated 16, 5/11/10, 2010, 2009.     10/5/2017  She is daily compliant with lamotrigine and takes Adderall 40mg qAM and 20mg qNoon for narcolepsy; she takes Adderall with caffeine pills (400mg with am dose and 200mg with noon dose); she denies side effects of medication.     She works full time as a  at [employer], she started there in ; she appreciates her new boss that recognizes her ability. She is doing the work of four employees at work leading her to forget lamotrigine for a couple days. Discussed risks of non compliance including SJS.    With her 's encouragement, she is job seeking to gain a jail plan and other benefits. She and her  are raising their 14yo and 14yo daughters.     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 They are taking a short break now but looks forward to starting playing again.     She came to the clinic originally for skin picking; symptoms wax and wane; she has spent upwards of one hour daily since age 19 picking at her upper arms; previous notes report history of picking at chest and back. She feels she's in the best state she's been in for a long time and feels that though the skin picking continues, it no longer preoccupies her time. She did six months of therapy here with Dr. Garduno which improved symptoms about . Stress increases urge to pick.    MED HISTORY:  She previously trialed Provigil (didn't work very well, trialed  "about a couple months), Nuvigil 250mg (trialed in 2013-ineffective), buspar (unknown), Wellbutrin XL (stopped immediately after taking with Adderall which induced two seizures in 2009/2010), amitriptyline 10mg for skin picking (unknown), NAC (ineffective, this likely before she started therapy and may not have participated in titration).    Psychiatric Review of Systems:  Depression and anxiety: insignificant symptoms, felt depressed earlier this week when she forgot to take lamotrigine for a couple days during increased stress at work.    Denies lethality.    Review of Medical Systems:  Appetite: intact, vegetarian, weight stable  Sleep: stays up late for \"me time\"; averages 7 hours most nights during the week and longer on the weekend  Self Care: enjoys time with her , enjoyed trip to [employer] last weekend  Housework and hygiene: managing   Energy: intact  Concentration: takes Adderall for narcolepsy vs attention    Current Substance Use:  Social History     Social History     Marital status: Single     Spouse name: N/A     Number of children: N/A     Years of education: N/A     Social History Main Topics     Smoking status: Former Smoker     Types: Cigarettes     Quit date: 1/1/2008     Smokeless tobacco: Never Used     Alcohol use Yes      Comment: 3 beer every 2 days/week     Drug use: No     Sexual activity: Yes     Partners: Male     Birth control/ protection: IUD     Other Topics Concern     None     Social History Narrative     Alcohol: limits to craft beer, takes caffeine pills with Adderall for narcolepsy  Past Medical History:   Past Medical History:   Diagnosis Date     Anxiety      Bladder spasms      Depression 01/05/2009     Exercise-induced asthma 06/11/2009     Narcolepsy 12/28/2009     Seizure (H) 2 approx [id number]    Secondary to Wellbutrin and stimulant use     Medical health update: no updates today. She's followed at [facility]. No flares with her bladder or asthma.      History of general " tonic clonic seizure 9/20/2009 without aura with postictal confusion; second seizure while with SO on 1/23/10 (tonic clonic; foaming at the mouth with post ictal confusion and lethargy. Narcolepsy diagnosed about 2004. Remote history of febrile convulsion before [age] yo, her daughter has a history of febrile convulsions.     Allergies:   Allergies   Allergen Reactions     Wellbutrin [Bupropion] Other (See Comments)     Two seizures while on Adderall and Wellbutrin     Penicillins Rash        Current Medications     Current Outpatient Prescriptions Ordered in [facility]   Medication Sig Dispense Refill     lamoTRIgine (LAMICTAL) 150 MG tablet Take 1 tablet (150 mg) by mouth daily 90 tablet 0     amphetamine-dextroamphetamine (ADDERALL) 20 MG per tablet Take 2 tabs in the morning, 1 tab at noon. 90 tablet 0     amphetamine-dextroamphetamine (ADDERALL) 20 MG per tablet Take 2 tabs in the morning, 1 tab at noon. 90 tablet 0     amphetamine-dextroamphetamine (ADDERALL) 20 MG per tablet Take 2 tabs in the morning, 1 tab at noon. 90 tablet 0     fluocin-hydroquinone-tretinoin 0.01-4-0.05 % CREA Externally apply topically daily To the face 30 g 2     hydroquinone 4 % CREA Externally apply topically daily In the morning 30 g 2     melatonin 5 MG tablet Take 10 mg by mouth nightly as needed        caffeine (NO-DOZE) 200 MG TABS Take 1 tablet (200 mg) by mouth 2 times daily (Patient taking differently: Take 200 mg by mouth 2 times daily 400 mg qam and 200 qnoon) 150 tablet      cetirizine (ZYRTEC ALLERGY) 10 MG tablet Take 10 mg by mouth daily       acetaminophen (TYLENOL) 500 MG tablet take 1-2 Tabs by mouth every 6 hours as needed.       Ibuprofen 200 MG capsule take 200-400 mg by mouth every 6 hours as needed.       No current Epic-ordered facility-administered medications on file.       Mental Status Exam     Appearance:  Casually dressed and Well groomed  Behavior/relationship to examiner/demeanor:  Cooperative, Engaged and  "Pleasant  Orientation: Oriented to person, place, time and situation  Psychomotor: WNL  Speech Rate:  Normal  Speech Spontaneity:  Normal  Mood:  \"good\"  Affect:  Inappropriate  Thought Process (Associations):  Logical, Linear and Goal directed  Thought Content:  no evidence of suicidal or homicidal ideation, denies suicidal ideation, intent or thoughts and patient denies auditory and visual hallucinations  Abnormal Perception:  None  Attention/Concentration:  Normal  Language:  Intact  Insight:  Good  Judgment:  Good      Results     Vital signs: Wt 71.3 kg (157 lb 3.2 oz)  BMI 25.37 kg/m2    Laboratory Data:   Lab Results   Component Value Date    WBC 7.6 09/12/2010    HGB 13.9 09/12/2010    HCT 39.9 09/12/2010     09/12/2010    ALT 20 04/12/2013    AST 18 04/12/2013     (H) 04/12/2013    BUN 9 04/12/2013    CO2 31 04/12/2013    TSH 1.18 04/12/2013     Assessment & Plan     Lashonda is seen today for follow up.    Diagnosis  Axis 1: narcolepsy, MDD, recurrent  Axis 2: none    Plan:  Medication: continue lamotrigine 150mg daily with Adderall 40mg qAM, 20mg qNoon  OTC Recommendations: continue melatonin 5-15mg nightly PRN  Other: none  Lab Orders: none today, encouraged to see PCP for physical with labs  Referrals: none, seeing family therapist at [facility]  Release of Information: none  Future Treatment Considerations: none  Return for Follow Up: 3 months, sooner as needed    She voices understanding of the treatment plan including discussion of options, risks/ benefits especially risks with non compliance. She has clinic contact information and will seek emergency services if urgent or life threatening symptoms present. Josi understands risks associated with drug and alcohol use.     Visit was spent counseling the patient and/or coordinating care regarding review of social and occupational functioning.  In addition patient was counseled on health and wellness practices to augment " medication treatment of symptoms. See note for details.    PHQ-9 score:  3  PHQ-9 SCORE 3/24/2017   Total Score -   Total Score 2     GAD7:   JULITA-7 SCORE 3/12/2015   Total Score 1     : 10/2017  AYSE Dumont CNP 10/5/2017

## 2017-10-05 NOTE — MR AVS SNAPSHOT
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      Your next 10 appointments already scheduled     Jan 11, 2018 12:00 PM CST   Adult Med Follow UP with AYSE Cuello CNP   Psychiatry Clinic ([facility])    [address] [facility] [address] [phone]              Who to contact     Please call your clinic at [phone] to:    Ask questions about your health    Make or cancel appointments    Discuss your medicines    Learn about your test results    Speak to your doctor   If you have compliments or concerns about an experience at your clinic, or if you wish to file a complaint, please contact [facility] Patient Relations at [phone] or email us at [email or web address]         Additional Information About Your Visit        MyChart Information     [employer] gives you secure access to your electronic health record. If you see a primary care provider, you can also send messages to your care team and make appointments. If you have questions, please call your primary care clinic.  If you do not have a primary care provider, please call [phone] and they will assist you.      [employer] is an electronic gateway that provides easy, online access to your medical records. With [employer], you can request a clinic appointment, read your test results, renew a prescription or  communicate with your care team.     To access your existing account, please contact your [facility] or call [phone] for assistance.        Care EveryWhere ID     This is your Care EveryWhere ID. This could be used by other organizations to access your [facility] medical records  [id number]        Your Vitals Were     Pulse BMI (Body Mass Index)                87 25.37 kg/m2           Blood Pressure from Last 3 Encounters:   10/05/17 110/66   03/24/17 109/62   01/19/17 118/76    Weight from Last 3 Encounters:   10/05/17 71.3 kg (157 lb 3.2 oz)   03/24/17 72 kg (158 lb 12.8 oz)   01/19/17 73.5 kg (162 lb)              Today, you had the following     No orders found for display         Today's Medication Changes          These changes are accurate as of: 10/5/17 11:59 PM.  If you have any questions, ask your nurse or doctor.               These medicines have changed or have updated prescriptions.        Dose/Directions    caffeine 200 MG Tabs tablet   Commonly known as:  NO-DOZE   This may have changed:  additional instructions   Used for:  Major depressive disorder, recurrent episode (H)        Dose:  200 mg   Take 1 tablet (200 mg) by mouth 2 times daily   Quantity:  150 tablet   Refills:  0            Where to get your medicines      These medications were sent to [facility] [place] [id number] [place] - [address]  [address] [place] [address]     Phone:  [phone]     lamoTRIgine 150 MG tablet         Some of these will need a paper prescription and others can be bought over the counter.  Ask your nurse if you have questions.     Bring a paper prescription for each of these medications     amphetamine-dextroamphetamine 20 MG per tablet    amphetamine-dextroamphetamine 20 MG per tablet    amphetamine-dextroamphetamine 20 MG per tablet                Primary Care Provider Office Phone # Fax #    AYSE Hamilton [phone]  [phone]       [facility] [address]  [place] [facility] [address]        Equal Access to Services     WILLIE CADET : Hadii aad ku hadasho Soomaali, waaxda luqadaha, qaybta kaalmada adeegyada, waxamy tachoin hayaalaurel florentinowilman keirayaakovaugust amezcua. So [facility] [phone].    ATENCIÓN: Si habla español, tiene a sarkar disposición servicios gratuitos de asistencia lingüística. Miguelame al [phone].    We comply with applicable federal civil rights laws and [place] laws. We do not discriminate on the basis of race, color, national origin, age, disability, sex, sexual orientation, or gender identity.            Thank you!     Thank you for choosing PSYCHIATRY CLINIC  for your care. Our goal is always to provide you with excellent care. Hearing back from our patients is one way we can continue to improve our services. Please take a few minutes to complete the written survey that you may receive in the mail after your visit with us. Thank you!             Your Updated Medication List - Protect others around you: Learn how to safely use, store and throw away your medicines at www.disposemymeds.org.          This list is accurate as of: 10/5/17 11:59 PM.  Always use your most recent med list.                   Brand Name Dispense Instructions for use Diagnosis    * amphetamine-dextroamphetamine 20 MG per tablet    ADDERALL    90 tablet    Take 2 tabs in the morning, 1 tab at noon.    Primary narcolepsy 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       * amphetamine-dextroamphetamine 20 MG per tablet    ADDERALL    90 tablet    Take 2 tabs in the morning, 1 tab at noon.    Primary narcolepsy 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       * amphetamine-dextroamphetamine 20 MG per tablet    ADDERALL    90 tablet    Take 2 tabs in the morning, 1 tab at noon.    Primary narcolepsy 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       caffeine 200 MG Tabs tablet    NO-DOZE    150 tablet    Take 1 tablet (200 mg) by mouth 2 times daily    Major depressive disorder, recurrent episode (H)        fluocin-hydroquinone-tretinoin 0.01-4-0.05 % Crea     30 g    Externally apply topically daily To the face    Melasma       hydroquinone 4 % Crea     30 g    Externally apply topically daily In the morning    Melasma       ibuprofen 200 MG capsule      take 200-400 mg by mouth every 6 hours as needed.        lamoTRIgine 150 MG tablet    LaMICtal    90 tablet    Take 1 tablet (150 mg) by mouth daily    Major depressive disorder, recurrent episode, in full remission (H)       melatonin 5 MG tablet      Take 10 mg by mouth nightly as needed        TYLENOL 500 MG tablet   Generic drug:  acetaminophen      take 1-2 Tabs by mouth every 6 hours as needed.        ZYRTEC ALLERGY 10 MG tablet   Generic drug:  cetirizine      Take 10 mg by mouth daily        * Notice:  This list has 3 medication(s) that are the same as other medications prescribed for you. Read the directions carefully, and ask your doctor or other care provider to review them with you.

## 2017-10-22 ENCOUNTER — HEALTH MAINTENANCE LETTER (OUTPATIENT)
Age: 37
End: 2017-10-22

## 2018-01-11 ENCOUNTER — OFFICE VISIT (OUTPATIENT)
Dept: PSYCHIATRY | Facility: CLINIC | Age: 38
End: 2018-01-11
Attending: NURSE PRACTITIONER
Payer: COMMERCIAL

## 2018-01-11 VITALS
HEART RATE: 84 BPM | SYSTOLIC BLOOD PRESSURE: 114 MMHG | BODY MASS INDEX: 24.86 KG/M2 | DIASTOLIC BLOOD PRESSURE: 71 MMHG | WEIGHT: 154 LBS

## 2018-01-11 DIAGNOSIS — G47.419 PRIMARY NARCOLEPSY WITHOUT CATAPLEXY: ICD-10-CM

## 2018-01-11 DIAGNOSIS — F33.42 MAJOR DEPRESSIVE DISORDER, RECURRENT EPISODE, IN FULL REMISSION (H): ICD-10-CM

## 2018-01-11 PROCEDURE — G0463 HOSPITAL OUTPT CLINIC VISIT: HCPCS | Mod: ZF

## 2018-01-11 RX ORDER — DEXTROAMPHETAMINE SACCHARATE, AMPHETAMINE ASPARTATE, DEXTROAMPHETAMINE SULFATE AND AMPHETAMINE SULFATE 5; 5; 5; 5 MG/1; MG/1; MG/1; MG/1
TABLET ORAL
Qty: 90 TABLET | Refills: 0 | Status: SHIPPED | OUTPATIENT
Start: 2018-01-11 | End: 2018-04-03

## 2018-01-11 RX ORDER — LAMOTRIGINE 150 MG/1
150 TABLET ORAL DAILY
Qty: 90 TABLET | Refills: 0 | Status: SHIPPED | OUTPATIENT
Start: 2018-01-11 | End: 2018-04-03

## 2018-01-11 ASSESSMENT — PATIENT HEALTH QUESTIONNAIRE - PHQ9: SUM OF ALL RESPONSES TO PHQ QUESTIONS 1-9: 0

## 2018-01-11 ASSESSMENT — PAIN SCALES - GENERAL: PAINLEVEL: NO PAIN (0)

## 2018-01-11 NOTE — PROGRESS NOTES
Outpatient Psychiatry Progress Note     Provider: AYSE Dumont CNP  Date: 2018  Service:  Medication management.   Patient Identification: Lashonda Rodriguez  : 1980   MRN: [id number]    Lashonda Rodriguez is a 37 year old female who presents for ongoing psychiatric care.  Lashonda was last seen in clinic on 10/5/17. At that time, she chose to   continue lamotrigine 150mg daily and continue Adderall 40mg qAM and 20mg qNoon.    She prefers to be called Josi. Additional history in notes dated 16, 5/11/10, 2010, 2009.     MED HISTORY:  Provigil (didn't work very well, trialed a couple months)  Nuvigil 250mg (trialed in -ineffective)  buspar (unknown)  Wellbutrin XL (stopped after taking with Adderall which induced two seizures in )  Amitriptyline 10mg for skin picking (unknown)  NAC (ineffective, this likely before she started therapy and may not have followed titration).    2018  Today Lashonda reports her good mood continues.    She is bored at work. She's been promised continuing education but this doesn't pan out. She likes to know she's in a place where she can grow.     She is daily compliant with lamotrigine and takes Adderall 40mg qAM and 20mg qNoon for narcolepsy; she takes Adderall with caffeine pills (400mg with am dose and 200mg with noon dose); she denies side effects of medication.     She is  to her  of 2 years (and partner of 12 years). She is raising her 12yo and 14yo daughters and trying to navigate the strain between her oldest daughter and her . She is taking her daughters on a cruise in May.     Skin picking symptoms are minimal. She used to picks at her upper arms, chest and back. She continues to feel well. Six months of therapy here with Dr. Garduno which improved symptoms about . Stress increases urge to pick.     Sleeping better; this reduces somnolence the next day in context of narcolepsy. Diagnosed via sleep  "study about 2007 but recalls symptoms (\"so tired I could not hold a conversation\") since her teen years.       She works full time as a  at [employer]. Her new boss that recognizes her ability. 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    Compliance: daily  Side effects: denies    Psychiatric Review of Systems:  Depression and anxiety: insignificant    Lethality: denies      Review of Medical Systems:  Appetite: intact, vegetarian, weight stable  Sleep: improved, going to bed earlier  Self Care: enjoys time with her , spending social time weekly with her band. Looks forward to a cruise with her daughters  Housework and hygiene: managing as is normal for her  Energy: intact  Concentration: intact    Current Substance Use:    Social History     Social History     Marital status: Single     Spouse name: N/A     Number of children: N/A     Years of education: N/A     Social History Main Topics     Smoking status: Former Smoker     Types: Cigarettes     Quit date: 1/1/2008     Smokeless tobacco: Never Used     Alcohol use Yes      Comment: 3 beer every 2 days/week     Drug use: No     Sexual activity: Yes     Partners: Male     Birth control/ protection: IUD     Other Topics Concern     None     Social History Narrative     Alcohol: limits to craft beer, takes caffeine pills with Adderall for narcolepsy    Past Medical History:   Past Medical History:   Diagnosis Date     Anxiety      Bladder spasms      Depression 01/05/2009     Exercise-induced asthma 06/11/2009     Narcolepsy 12/28/2009     Seizure (H) 2 approx [id number]    Secondary to Wellbutrin and stimulant use     Medical health update: none today; no bladder or asthma problems    History of general tonic clonic seizure 9/20/2009 without aura with postictal confusion; second seizure while with SO on 1/23/10 (tonic clonic; foaming at the mouth with " post ictal confusion and lethargy. Narcolepsy diagnosed about 2004. Remote history of febrile convulsion before [age] yo, her daughter has a history of febrile convulsions.      Allergies:   Allergies   Allergen Reactions     Wellbutrin [Bupropion] Other (See Comments)     Two seizures while on Adderall and Wellbutrin     Penicillins Rash        Current Medications     Current Outpatient Prescriptions Ordered in [facility]   Medication Sig Dispense Refill     lamoTRIgine (LAMICTAL) 150 MG tablet Take 1 tablet (150 mg) by mouth daily 90 tablet 0     amphetamine-dextroamphetamine (ADDERALL) 20 MG per tablet Take 2 tabs in the morning, 1 tab at noon. 90 tablet 0     amphetamine-dextroamphetamine (ADDERALL) 20 MG per tablet Take 2 tabs in the morning, 1 tab at noon. 90 tablet 0     amphetamine-dextroamphetamine (ADDERALL) 20 MG per tablet Take 2 tabs in the morning, 1 tab at noon. 90 tablet 0     fluocin-hydroquinone-tretinoin 0.01-4-0.05 % CREA Externally apply topically daily To the face 30 g 2     hydroquinone 4 % CREA Externally apply topically daily In the morning 30 g 2     melatonin 5 MG tablet Take 10 mg by mouth nightly as needed        caffeine (NO-DOZE) 200 MG TABS Take 1 tablet (200 mg) by mouth 2 times daily (Patient taking differently: Take 200 mg by mouth 2 times daily 400 mg qam and 200 qnoon) 150 tablet      cetirizine (ZYRTEC ALLERGY) 10 MG tablet Take 10 mg by mouth daily       acetaminophen (TYLENOL) 500 MG tablet take 1-2 Tabs by mouth every 6 hours as needed.       Ibuprofen 200 MG capsule take 200-400 mg by mouth every 6 hours as needed.       No current Epic-ordered facility-administered medications on file.       Mental Status Exam     Appearance:  Casually dressed, Well groomed, Dressed appropriately for weather and Appears stated age  Behavior/relationship to examiner/demeanor:  Cooperative, Engaged and Pleasant  Orientation: Oriented to person, place, time and situation  Psychomotor: WNL  Speech  "Rate:  Normal  Speech Spontaneity:  Normal  Mood:  \"good\"  Affect:  Appropriate/mood-congruent  Thought Process (Associations):  Logical, Linear and Goal directed  Thought Content:  no evidence of suicidal or homicidal ideation, no overt psychosis, denies suicidal ideation, intent or thoughts, patient denies auditory and visual hallucinations and patient does not appear to be responding to internal stimuli  Abnormal Perception:  None  Attention/Concentration:  Normal  Language:  Intact  Insight:  Good  Judgment:  Good      Results     Vital signs: /71  Pulse 84  Wt 69.9 kg (154 lb)  BMI 24.86 kg/m2    Laboratory Data:   Lab Results   Component Value Date    WBC 7.6 09/12/2010    HGB 13.9 09/12/2010    HCT 39.9 09/12/2010     09/12/2010    ALT 20 04/12/2013    AST 18 04/12/2013     (H) 04/12/2013    BUN 9 04/12/2013    CO2 31 04/12/2013    TSH 1.18 04/12/2013     Assessment & Plan     Lashonda is seen today for follow up.    Diagnosis  Axis 1: narcolepsy, MDD, recurrent  Axis 2: none     Plan:  Medication: continue lamotrigine 150mg daily with Adderall 40mg qAM, 20mg qNoon  OTC Recommendations: continue melatonin 5-15mg nightly PRN  Other: none  Lab Orders: none today, encouraged to see PCP for physical with labs  Referrals: none, seeing family therapist at [facility]  Release of Information: none  Future Treatment Considerations: none  Return for Follow Up: 3 months, sooner as needed    Visit was spent counseling the patient and/or coordinating care regarding review of social and occupational functioning.  In addition patient was counseled on health and wellness practices to augment medication treatment of symptoms. See note for details.    PHQ-9 score:  0  PHQ-9 SCORE 10/5/2017   Total Score -   Total Score 3     GAD7:   JULITA-7 SCORE 3/12/2015   Total Score 1     : 01/2018  Farzaneh Hicks, AYSE CNP 1/11/2018     PSYCHIATRY CLINIC INDIVIDUAL PSYCHOTHERAPY NOTE                 "                                     [16]   Start time: 12:30  End time: 12:46  Date reviewed - 01/11/2018       Date next due - 04/11/2018     Subjective: This supportive psychotherapy session addressed issues related to recent stressors and goals in treatment..  Patient's reaction: Action in the context of mental status appropriate for ambulatory setting.  Progress: good  Plan: RTC 3 months  Psychotherapy services during this visit included myself and Josi.  TREATMENT  PLAN          SYMPTOMS; PROBLEMS   MEASURABLE GOALS;    FUNCTIONAL IMPROVEMENT INTERVENTIONS;   GAINS MADE DISCHARGE CRITERIA   Psychosocial: parent-child stress   learn 2 new ways of coping with routine stressors building on strengths  communication skills  community/ family support marked symptom improvement   Psychosocial: occupational / vocational stress   find enjoyment at least once a day and develop strategies to address boredom at work communication skills  community/ family support  job search  self-care skills marked symptom improvement     PROVIDER:  AYSE Dumont CNP

## 2018-01-11 NOTE — MR AVS SNAPSHOT
After Visit Summary   1/11/2018    Lashonda Rodriguez    MRN: [id number]           Patient Information     Date Of Birth          1980        Visit Information        Provider Department      1/11/2018 12:00 PM Farzaneh Hicks APRN CNP Psychiatry Clinic        Today's Diagnoses     Major depressive disorder, recurrent episode, in full remission (H)        Primary narcolepsy 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           Follow-ups after your visit        Follow-up notes from your care team     Return in about 12 weeks (around 4/5/2018), or if symptoms worsen or fail to improve, for Routine Visit.      Your next 10 appointments already scheduled     Feb 22, 2018 11:15 AM CST   (Arrive by 11:00 AM)   New Patient Visit with Mike Castellanos MD   [facility] D[facility]atology ([facility])    [address] [place] [id number] [phone]            Apr 03, 2018 12:00 PM CDT   Adult Med Follow UP with AYSE Cuello CNP   Psychiatry Clinic ([facility])    [place] [id number] [facility] [address] [phone]              Who to contact     Please call your clinic at [phone] to:    Ask questions about your health    Make or cancel appointments    Discuss your medicines    Learn about your test results    Speak to your doctor   If you have compliments or concerns about an experience at your clinic, or if you wish to file a complaint, please contact [facility] Patient Relations at [phone] or email us at [email or web address]         Additional Information About Your Visit        MyChart Information     [employer] gives you secure access to your electronic health record. If you see a primary care provider, you can also send messages to your care team and make appointments. If you have questions, please call your primary care clinic.  If you do not have a primary care  provider, please call [phone] and they will assist you.      [employer] is an electronic gateway that provides easy, online access to your medical records. With [employer], you can request a clinic appointment, read your test results, renew a prescription or communicate with your care team.     To access your existing account, please contact your [facility] or call [phone] for assistance.        Care EveryWhere ID     This is your Care EveryWhere ID. This could be used by other organizations to access your [place] medical records  [id number]        Your Vitals Were     Pulse BMI (Body Mass Index)                84 24.86 kg/m2           Blood Pressure from Last 3 Encounters:   01/11/18 114/71   10/05/17 110/66   03/24/17 109/62    Weight from Last 3 Encounters:   01/11/18 69.9 kg (154 lb)   10/05/17 71.3 kg (157 lb 3.2 oz)   03/24/17 72 kg (158 lb 12.8 oz)              Today, you had the following     No orders found for display         Today's Medication Changes          These changes are accurate as of: 1/11/18 11:59 PM.  If you have any questions, ask your nurse or doctor.               These medicines have changed or have updated prescriptions.        Dose/Directions    caffeine 200 MG Tabs tablet   Commonly known as:  NO-DOZE   This may have changed:  additional instructions   Used for:  Major depressive disorder, recurrent episode (H)        Dose:  200 mg   Take 1 tablet (200 mg) by mouth 2 times daily   Quantity:  150 tablet   Refills:  0            Where to get your medicines      These medications were sent to [place] [id number] [place] [facility] [place] - [address]  [address] [place] [address]     Phone:  [phone]     lamoTRIgine 150 MG tablet         Some of these will need a paper prescription and others can be bought over the counter.  Ask your nurse if you have questions.     Bring a paper prescription for each of these medications      amphetamine-dextroamphetamine 20 MG per tablet    amphetamine-dextroamphetamine 20 MG per tablet    amphetamine-dextroamphetamine 20 MG per tablet                Primary Care Provider Office Phone # Fax #    AYSE Hamilton [phone]       [facility] [address]  [facility] [address]        Equal Access to Services     [facility] HELIO : Hadii aad ku hadasho Soomaali, waaxda luqadaha, qaybta kaalmada adeegyada, waxay idiin hayaan adeeg kharash la'aan . So [facility] [phone].    ATENCIÓN: Si habla español, tiene a sarkar disposición servicios gratuitos de asistencia lingüística. Minh al [phone].    We comply with applicable federal civil rights laws and [place] laws. We do not discriminate on the basis of race, color, national origin, age, disability, sex, sexual orientation, or gender identity.            Thank you!     Thank you for choosing PSYCHIATRY CLINIC  for your care. Our goal is always to provide you with excellent care. Hearing back from our patients is one way we can continue to improve our services. Please take a few minutes to complete the written survey that you may receive in the mail after your visit with us. Thank you!             Your Updated Medication List - Protect others around you: Learn how to safely use, store and throw away your medicines at www.disposemymeds.org.          This list is accurate as of: 1/11/18 11:59 PM.  Always use your most recent med list.                   Brand Name Dispense Instructions for use Diagnosis    * amphetamine-dextroamphetamine 20 MG per tablet    ADDERALL    90 tablet    Take 2 tabs in the morning, 1 tab at noon.    Primary narcolepsy 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       * amphetamine-dextroamphetamine 20 MG per tablet    ADDERALL    90 tablet    Take 2 tabs in the morning, 1 tab at noon.    Primary narcolepsy 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       * amphetamine-dextroamphetamine 20 MG per tablet    ADDERALL    90 tablet    Take 2 tabs in the  morning, 1 tab at noon.    Primary narcolepsy 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       caffeine 200 MG Tabs tablet    NO-DOZE    150 tablet    Take 1 tablet (200 mg) by mouth 2 times daily    Major depressive disorder, recurrent episode (H)       fluocin-hydroquinone-tretinoin 0.01-4-0.05 % Crea     30 g    Externally apply topically daily To the face    Melasma       hydroquinone 4 % Crea     30 g    Externally apply topically daily In the morning    Melasma       ibuprofen 200 MG capsule      take 200-400 mg by mouth every 6 hours as needed.        lamoTRIgine 150 MG tablet    LaMICtal    90 tablet    Take 1 tablet (150 mg) by mouth daily    Major depressive disorder, recurrent episode, in full remission (H)       melatonin 5 MG tablet      Take 10 mg by mouth nightly as needed        TYLENOL 500 MG tablet   Generic drug:  acetaminophen      take 1-2 Tabs by mouth every 6 hours as needed.        ZYRTEC ALLERGY 10 MG tablet   Generic drug:  cetirizine      Take 10 mg by mouth daily        * Notice:  This list has 3 medication(s) that are the same as other medications prescribed for you. Read the directions carefully, and ask your doctor or other care provider to review them with you.

## 2018-02-22 ENCOUNTER — OFFICE VISIT (OUTPATIENT)
Dept: DERMATOLOGY | Facility: CLINIC | Age: 38
End: 2018-02-22
Payer: COMMERCIAL

## 2018-02-22 DIAGNOSIS — L81.1 MELASMA: ICD-10-CM

## 2018-02-22 ASSESSMENT — PAIN SCALES - GENERAL: PAINLEVEL: NO PAIN (0)

## 2018-02-22 NOTE — NURSING NOTE
Dermatology Rooming Note    Lashonda Rodriguez's goals for this visit include:   Chief Complaint   Patient presents with     Derm Problem     Lashonda is here to discuss melasma on face not from pregnancy.       Alethea Beasley CMA

## 2018-02-22 NOTE — LETTER
2/22/2018       RE: Lashonda Rodriguez  [address]  [facility] [address]     Dear Colleague,    Thank you for referring your patient, Lashonda Rodriguez, to the [facility] DERMATOLOGY at [facility]. Please see a copy of my visit note below.    CHIEF COMPLAINT:  Follow up on melasma.      HISTORY OF PRESENT ILLNESS:  Lashonda is a very pleasant 38-year-old female who saw Dr. Елена Arevalo in our [facility] on 02/26/2016 for melasma.  At that time, Tri-Celia and careful sun protection were recommended.  Lashonda reports that she has had some improvement with Tri-Celia, but has had persistent hyperpigmentation of her forehead and cheeks and is wondering what the most appropriate treatments are and whether the diagnosis is, in fact, melasma.  She is wondering about the possibility of post-inflammation hyperpigmentation after she used a salicylic acid peel many months ago.  She reports that she has been on the Mirena IUD for many years and this problem arose while she was on a stable dose.  She also reports 2 ectopic pregnancies while on contraception, though neither of these seems to correlate timewise with the onset of her hyperpigmentation.  She denies any symptoms such as itching, pain or bleeding.      REVIEW OF SYSTEMS:  No recent fevers or chills.  No polyuria.      PAST MEDICAL HISTORY:  No known history of diabetes or prediabetes.      FAMILY HISTORY:  No significant family history of diabetes.      PHYSICAL EXAMINATION:   GENERAL:  This is a well-appearing, well-nourished young woman with a normal mood and affect who is oriented x3.   SKIN:  A cutaneous exam of the head, neck and bilateral upper extremities was performed and demonstrates subtle macular hyperpigmentation with a somewhat dot-like pattern on the lateral aspects of her forehead, temples and cheeks.  No palpable component is appreciated.      IN OFFICE EXAM:  EMMA preparation is negative for  spores or hyphal elements.      ASSESSMENT AND PLAN:  Melasma.  We discussed the differential diagnosis for melasma mimickers and the fact that I believe her exam and history are strongly consistent with the diagnosis of melasma.  We also discussed the association with hormonal contraceptives such as Mirena, but she is not interested in stopping Mirena at this time.  We also discussed the natural history and importance of sun exposure in the pathogenesis of melasma.  Today's plan is as follows:   1.  I re-prescribed Tri-Celia cream to be applied once to twice daily to the affected areas.  We discussed the possible risks of paradoxical hyperpigmentation with long-term use.   2.  We discussed the importance of strict photoprotection including sun protective clothing such as wide brimmed hats and ideally a physical sunscreen that contains either titanium or zinc or both.   3.  We also discussed adjunctive measures, which have been reportedly helpful such as topical kojic acid and ascorbic acid.  She will consider purchasing these nutraceutical type products over-the-counter or if she is unhappy with her Tri-Celia response.   4.  She will follow up in our clinic in 2-3 months' time.       Mike Castellanos MD  Dermatology Attending

## 2018-02-22 NOTE — PROGRESS NOTES
CHIEF COMPLAINT:  Follow up on melasma.      HISTORY OF PRESENT ILLNESS:  Lashonda is a very pleasant 38-year-old female who saw Dr. Елена Arevalo in our [facility] on 02/26/2016 for melasma.  At that time, Tri-Celia and careful sun protection were recommended.  Lashonda reports that she has had some improvement with Tri-Celia, but has had persistent hyperpigmentation of her forehead and cheeks and is wondering what the most appropriate treatments are and whether the diagnosis is, in fact, melasma.  She is wondering about the possibility of post-inflammation hyperpigmentation after she used a salicylic acid peel many months ago.  She reports that she has been on the Mirena IUD for many years and this problem arose while she was on a stable dose.  She also reports 2 ectopic pregnancies while on contraception, though neither of these seems to correlate timewise with the onset of her hyperpigmentation.  She denies any symptoms such as itching, pain or bleeding.      REVIEW OF SYSTEMS:  No recent fevers or chills.  No polyuria.      PAST MEDICAL HISTORY:  No known history of diabetes or prediabetes.      FAMILY HISTORY:  No significant family history of diabetes.      PHYSICAL EXAMINATION:   GENERAL:  This is a well-appearing, well-nourished young woman with a normal mood and affect who is oriented x3.   SKIN:  A cutaneous exam of the head, neck and bilateral upper extremities was performed and demonstrates subtle macular hyperpigmentation with a somewhat dot-like pattern on the lateral aspects of her forehead, temples and cheeks.  No palpable component is appreciated.      IN OFFICE EXAM:  EMMA preparation is negative for spores or hyphal elements.      ASSESSMENT AND PLAN:  Melasma.  We discussed the differential diagnosis for melasma mimickers and the fact that I believe her exam and history are strongly consistent with the diagnosis of melasma.  We also discussed the association with hormonal contraceptives  such as Mirena, but she is not interested in stopping Mirena at this time.  We also discussed the natural history and importance of sun exposure in the pathogenesis of melasma.  Today's plan is as follows:   1.  I re-prescribed Tri-Celia cream to be applied once to twice daily to the affected areas.  We discussed the possible risks of paradoxical hyperpigmentation with long-term use.   2.  We discussed the importance of strict photoprotection including sun protective clothing such as wide brimmed hats and ideally a physical sunscreen that contains either titanium or zinc or both.   3.  We also discussed adjunctive measures, which have been reportedly helpful such as topical kojic acid and ascorbic acid.  She will consider purchasing these nutraceutical type products over-the-counter or if she is unhappy with her Tri-Celia response.   4.  She will follow up in our clinic in 2-3 months' time.       Mike Castellanos MD  Dermatology Attending

## 2018-02-22 NOTE — MR AVS SNAPSHOT
"              After Visit Summary   2/22/2018    Lashonda Rodriguez    MRN: [id number]           Patient Information     Date Of Birth          1980        Visit Information        Provider Department      2/22/2018 11:15 AM Mike Castellanos MD [facility]        Today's Diagnoses     Melasma          Care Instructions    Sunscreens:  Physical blockers with titanium or zinc or both are preferred  Try a \"baby sunscreen\" first    If you cannot tolerate any physical sunscreen, a reasonable next step is  Antehelios lotion    Nutriceutical options:  Kojic acid  Ascorbic acid  As topical preparations - some evidence for improvement of melasma          Follow-ups after your visit        Your next 10 appointments already scheduled     Apr 03, 2018 12:00 PM CDT   Adult Med Follow UP with AYSE Cuello CNP   Psychiatry Clinic ([facility])    [address]  [address] [facility] [id number] [phone]            Apr 26, 2018  1:45 PM CDT   (Arrive by 1:30 PM)   Return Visit with Mike Castellanos MD   [facility] ([facility])    [address] [place] [address] [phone]              Who to contact     Please call your clinic at [phone] to:    Ask questions about your health    Make or cancel appointments    Discuss your medicines    Learn about your test results    Speak to your doctor            Additional Information About Your Visit        MyChart Information     [employer] gives you secure access to your electronic health record. If you see a primary care provider, you can also send messages to your care team and make appointments. If you have questions, please call your primary care clinic.  If you do not have a primary care provider, please call [phone] and they will assist you.      [employer] is an electronic gateway that provides easy, online access to your medical records. " With [employer], you can request a clinic appointment, read your test results, renew a prescription or communicate with your care team.     To access your existing account, please contact your [facility] or call [phone] for assistance.        Care EveryWhere ID     This is your Care EveryWhere ID. This could be used by other organizations to access your [place] medical records  [id number]         Blood Pressure from Last 3 Encounters:   01/19/17 118/76   03/09/15 100/62   09/12/10 98/66    Weight from Last 3 Encounters:   01/19/17 73.5 kg (162 lb)   03/09/15 67.7 kg (149 lb 3.2 oz)   09/12/10 68.9 kg (152 lb)              Today, you had the following     No orders found for display         Today's Medication Changes          These changes are accurate as of 2/22/18 12:25 PM.  If you have any questions, ask your nurse or doctor.               These medicines have changed or have updated prescriptions.        Dose/Directions    caffeine 200 MG Tabs tablet   Commonly known as:  NO-DOZE   This may have changed:  additional instructions   Used for:  Major depressive disorder, recurrent episode (H)        Dose:  200 mg   Take 1 tablet (200 mg) by mouth 2 times daily   Quantity:  150 tablet   Refills:  0       fluocin-hydroquinone-tretinoin 0.01-4-0.05 % Crea   This may have changed:    - how to take this  - when to take this  - additional instructions   Used for:  Melasma   Changed by:  Mike Castellanos MD        Twice daily To the face   Quantity:  30 g   Refills:  2            Where to get your medicines      These medications were sent to [id number] - [place], [place] - [address]  [address] [address]     Phone:  [phone]     fluocin-hydroquinone-tretinoin 0.01-4-0.05 % Crea                Primary Care Provider Office Phone # Fax #    AYSE Hamilton [phone]       [facility] [address]  [place] [address]        Equal Access to Services     WILLIE CADET : Hadii chetan stinson haddanielo Soearleali, waaxda luqadaha, qaybta kaalmada migueldavidcheng, saida bolivar josesitoradha crockettyaakovaugust amezcua. So [facility] [phone].    ATENCIÓN: Si habla español, tiene a sarkar disposición servicios gratuitos de asistencia lingüística. [facility] [phone].    We comply with applicable federal civil rights laws and [place] laws. We do not discriminate on the basis of race, color, national origin, age, disability, sex, sexual orientation, or gender identity.            Thank you!     Thank you for choosing [facility]  for your care. Our goal is always to provide you with excellent care. Hearing back from our patients is one way we can continue to improve our services. Please take a few minutes to complete the written survey that you may receive in the mail after your visit with us. Thank you!             Your Updated Medication List - Protect others around you: Learn how to safely use, store and throw away your medicines at www.disposemymeds.org.          This list is accurate as of 2/22/18 12:25 PM.  Always use your most recent med list.                   Brand Name Dispense Instructions for use Diagnosis    * amphetamine-dextroamphetamine 20 MG per tablet    ADDERALL    90 tablet    Take 2 tabs in the morning, 1 tab at noon.    Primary narcolepsy 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       * amphetamine-dextroamphetamine 20 MG per tablet    ADDERALL    90 tablet    Take 2 tabs in the morning, 1 tab at noon.    Primary narcolepsy 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       * amphetamine-dextroamphetamine 20 MG per tablet    ADDERALL    90 tablet    Take 2 tabs in the morning, 1 tab at noon.    Primary narcolepsy 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       caffeine 200 MG Tabs tablet    NO-DOZE    150 tablet    Take 1 tablet (200 mg) by mouth 2 times daily    Major depressive disorder, recurrent episode (H)       fluocin-hydroquinone-tretinoin 0.01-4-0.05 % Crea     30 g    Twice  daily To the face    Melasma       hydroquinone 4 % Crea     30 g    Externally apply topically daily In the morning    Melasma       ibuprofen 200 MG capsule      take 200-400 mg by mouth every 6 hours as needed.        lamoTRIgine 150 MG tablet    LaMICtal    90 tablet    Take 1 tablet (150 mg) by mouth daily    Major depressive disorder, recurrent episode, in full remission (H)       melatonin 5 MG tablet      Take 10 mg by mouth nightly as needed        TYLENOL 500 MG tablet   Generic drug:  acetaminophen      take 1-2 Tabs by mouth every 6 hours as needed.        ZYRTEC ALLERGY 10 MG tablet   Generic drug:  cetirizine      Take 10 mg by mouth daily        * Notice:  This list has 3 medication(s) that are the same as other medications prescribed for you. Read the directions carefully, and ask your doctor or other care provider to review them with you.

## 2018-02-22 NOTE — PATIENT INSTRUCTIONS
"Sunscreens:  Physical blockers with titanium or zinc or both are preferred  Try a \"baby sunscreen\" first    If you cannot tolerate any physical sunscreen, a reasonable next step is  Antehelios lotion    Nutriceutical options:  Kojic acid  Ascorbic acid  As topical preparations - some evidence for improvement of melasma  "

## 2018-02-24 PROBLEM — L81.1 MELASMA: Status: ACTIVE | Noted: 2018-02-24

## 2018-04-03 ENCOUNTER — OFFICE VISIT (OUTPATIENT)
Dept: PSYCHIATRY | Facility: CLINIC | Age: 38
End: 2018-04-03
Attending: NURSE PRACTITIONER
Payer: COMMERCIAL

## 2018-04-03 DIAGNOSIS — G47.419 PRIMARY NARCOLEPSY WITHOUT CATAPLEXY: ICD-10-CM

## 2018-04-03 DIAGNOSIS — F33.42 MAJOR DEPRESSIVE DISORDER, RECURRENT EPISODE, IN FULL REMISSION (H): ICD-10-CM

## 2018-04-03 RX ORDER — DEXTROAMPHETAMINE SACCHARATE, AMPHETAMINE ASPARTATE, DEXTROAMPHETAMINE SULFATE AND AMPHETAMINE SULFATE 5; 5; 5; 5 MG/1; MG/1; MG/1; MG/1
TABLET ORAL
Qty: 90 TABLET | Refills: 0 | Status: SHIPPED | OUTPATIENT
Start: 2018-04-03 | End: 2018-08-09

## 2018-04-03 RX ORDER — LAMOTRIGINE 150 MG/1
150 TABLET ORAL DAILY
Qty: 90 TABLET | Refills: 0 | Status: SHIPPED | OUTPATIENT
Start: 2018-04-03 | End: 2018-10-04

## 2018-04-03 NOTE — PROGRESS NOTES
Outpatient Psychiatry Progress Note     Provider:AYSE Calabrese, CNP scribe:  Iris Daniels NP student Date: 4/3/2018  Service:  Medication management.   Patient Identification: Lashonda Rodriguez  : 1980   MRN: [id number]    Lashonda Rodriguez is a 38 year old female who presents for ongoing psychiatric care.  Lashonda was last seen in clinic on 2018. At that time, she chose to continue lamotrigine 150mg daily with Adderall 40mg qAM, 20mg qNoon    4/3/2018  Today Lashonda reports she is OK but feeling frustrated about some billing concerns. She plans to contact the billing department.  - She recently got back from a cruise with her  in [place] for one week.  - she recently went on a cruise with her two daughters. She enjoyed these trips.   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  - she'll play [employer] shows this summer for the band [employer].     Compliance: daily   Side effects: none observed or reported    Psychiatric Review of Systems:  Depression: stable  Anxiety: stable    Lethality: denies    Review of Medical Systems:  Appetite: OK, eats vegetarian, weight stable  Sleep: likes to stay awake later at night and plan something to look forward to whether a vacation or a show. Sleeping well overall.   Self Care: enjoys vacation, playing music, time with friends and family  Housework and hygiene: managing normally  Energy: stable  Concentration: intact    Current Substance Use:    Social History     Social History     Marital status:      Spouse name: N/A     Number of children: N/A     Years of education: N/A     Social History Main Topics     Smoking status: Former Smoker     Types: Cigarettes     Quit date: 2008     Smokeless tobacco: Never Used     Alcohol use Yes      Comment: 3 beer every 2  days/week     Drug use: No     Sexual activity: Yes     Partners: Male     Birth control/ protection: IUD     Other Topics Concern     Not on file     Social History Narrative     Alcohol: limits to craft beer, takes caffeine pills with Adderall for narcolepsy     Past Medical History:   Past Medical History:   Diagnosis Date     Anxiety      Bladder spasms      Depression 01/05/2009     Exercise-induced asthma 06/11/2009     Narcolepsy 12/28/2009     Seizure (H) 2 approx [id number]    Secondary to Wellbutrin and stimulant use     Medical health update: none today; no bladder or asthma problems     History of general tonic clonic seizure 9/20/2009 without aura with postictal confusion; second seizure while with SO on 1/23/10 (tonic clonic; foaming at the mouth with post ictal confusion and lethargy. Narcolepsy diagnosed about 2004. Remote history of febrile convulsion before [age] yo, her daughter has a history of febrile convulsions.     Allergies:   Allergies   Allergen Reactions     Wellbutrin [Bupropion] Other (See Comments)     Two seizures while on Adderall and Wellbutrin     Penicillins Rash        Current Medications     Current Outpatient Prescriptions Ordered in [facility]   Medication Sig Dispense Refill     fluocin-hydroquinone-tretinoin 0.01-4-0.05 % CREA Twice daily To the face 30 g 2     lamoTRIgine (LAMICTAL) 150 MG tablet Take 1 tablet (150 mg) by mouth daily 90 tablet 0     amphetamine-dextroamphetamine (ADDERALL) 20 MG per tablet Take 2 tabs in the morning, 1 tab at noon. 90 tablet 0     amphetamine-dextroamphetamine (ADDERALL) 20 MG per tablet Take 2 tabs in the morning, 1 tab at noon. 90 tablet 0     amphetamine-dextroamphetamine (ADDERALL) 20 MG per tablet Take 2 tabs in the morning, 1 tab at noon. 90 tablet 0     hydroquinone 4 % CREA Externally apply topically daily In the morning (Patient not taking: Reported on 2/22/2018) 30 g 2     melatonin 5 MG tablet Take 10 mg by mouth nightly as needed         "caffeine (NO-DOZE) 200 MG TABS Take 1 tablet (200 mg) by mouth 2 times daily (Patient taking differently: Take 200 mg by mouth 2 times daily 400 mg qam and 200 qnoon) 150 tablet      cetirizine (ZYRTEC ALLERGY) 10 MG tablet Take 10 mg by mouth daily       acetaminophen (TYLENOL) 500 MG tablet take 1-2 Tabs by mouth every 6 hours as needed.       Ibuprofen 200 MG capsule take 200-400 mg by mouth every 6 hours as needed.       No current Epic-ordered facility-administered medications on file.       Mental Status Exam     Appearance:  No apparent distress, Casually dressed and Well groomed  Behavior/relationship to examiner/demeanor:  Cooperative, Engaged and Pleasant  Orientation: Oriented to person, place, time and situation  Psychomotor: normal for patient  Speech Rate:  Normal  Speech Spontaneity:  Normal  Mood:  \"good\" and friendly  Affect:  Appropriate/mood-congruent  Thought Process (Associations):  Goal directed  Thought Content:  no overt psychosis and denies suicidal ideation, intent or thoughts  Abnormal Perception:  None  Attention/Concentration:  Normal  Language:  Intact  Insight:  Good  Judgment:  Good      Results     Vital signs: There were no vitals taken for this visit.     HR: 77, BP: 110/71  Weight: 159#    Laboratory Data:   Lab Results   Component Value Date    WBC 7.6 09/12/2010    HGB 13.9 09/12/2010    HCT 39.9 09/12/2010     09/12/2010    ALT 20 04/12/2013    AST 18 04/12/2013     (H) 04/12/2013    BUN 9 04/12/2013    CO2 31 04/12/2013    TSH 1.18 04/12/2013     Assessment & Plan     Lashonda is seen today for follow up.     Diagnosis  Axis 1: narcolepsy, MDD, recurrent  Axis 2: none    Plan:  Medication:  continue lamotrigine 150mg daily with Adderall 40mg qAM, 20mg qNoon  OTC Recommendations: continue melatonin 5-15mg QHS PRN  Other: none  Lab Orders: none   Referrals: none, seeing family therapist at [facility]  Release of Information: none  Future Treatment " Considerations: none  Return for Follow Up: 6 months, sooner as needed (she will contact clinic in 3 months for a stimulant RF)    She voices understanding of the treatment plan including discussion of options, risks/ benefits. She has clinic contact information and will seek emergency services if urgent or life threatening symptoms present. Lashonda understands risks associated with drug and alcohol use.     Visit was spent counseling the patient and/or coordinating care regarding review of social and occupational functioning.  In addition patient was counseled on health and wellness practices to augment medication treatment of symptoms. See note for details.    PHQ-9 score:  0  PHQ-9 SCORE 1/11/2018   Total Score -   Total Score 0     GAD7:   JULITA-7 SCORE 3/12/2015   Total Score 1     : 4/2018- Adderall last filled 3/12/18  Iris Daniels 4/3/2018   Iris Daniels RN, Psychiatric/Mental Health Nurse Practitioner student, acting as scribe for AYSE Cuello, MICAH  PROVIDER:  AYSE Dumont CNP    I , AYSE Calabrese, personally performed the entire clinical encounter as documented by Iris Daniels RN DNP student.

## 2018-04-03 NOTE — MR AVS SNAPSHOT
After Visit Summary   4/3/2018    Lashonda Rodriguez    MRN: [id number]           Patient Information     Date Of Birth          1980        Visit Information        Provider Department      4/3/2018 12:00 PM Farzaneh Hicks APRN CNP Psychiatry Clinic        Today's Diagnoses     Major depressive disorder, recurrent episode, in full remission (H)        Primary narcolepsy 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           Follow-ups after your visit        Follow-up notes from your care team     Return in about 6 months (around 10/3/2018), or if symptoms worsen or fail to improve, for BP Recheck, Routine Visit.      Your next 10 appointments already scheduled     Apr 26, 2018  1:45 PM CDT   (Arrive by 1:30 PM)   Return Visit with Mike Castellanos MD   [facility] ([facility] [place])    [address] [address] [phone]            Oct 04, 2018 12:00 PM CDT   Adult Med Follow UP with AYSE Culelo CNP   Psychiatry Clinic ([facility])    [place] [id number] [address] [phone]              Who to contact     Please call your clinic at [phone] to:    Ask questions about your health    Make or cancel appointments    Discuss your medicines    Learn about your test results    Speak to your doctor            Additional Information About Your Visit        MyChart Information     [employer] gives you secure access to your electronic health record. If you see a primary care provider, you can also send messages to your care team and make appointments. If you have questions, please call your primary care clinic.  If you do not have a primary care provider, please call [phone] and they will assist you.      [employer] is an electronic gateway that provides easy, online access to your medical records. With [employer], you can request a clinic appointment, read your test results, renew  a prescription or communicate with your care team.     To access your existing account, please contact your [facility] or call [phone] for assistance.        Care EveryWhere ID     This is your Care EveryWhere ID. This could be used by other organizations to access your [place] medical records  [id number]         Blood Pressure from Last 3 Encounters:   01/11/18 114/71   10/05/17 110/66   03/24/17 109/62    Weight from Last 3 Encounters:   01/11/18 69.9 kg (154 lb)   10/05/17 71.3 kg (157 lb 3.2 oz)   03/24/17 72 kg (158 lb 12.8 oz)              Today, you had the following     No orders found for display         Today's Medication Changes          These changes are accurate as of 4/3/18 11:59 PM.  If you have any questions, ask your nurse or doctor.               These medicines have changed or have updated prescriptions.        Dose/Directions    caffeine 200 MG Tabs tablet   Commonly known as:  NO-DOZE   This may have changed:  additional instructions   Used for:  Major depressive disorder, recurrent episode (H)        Dose:  200 mg   Take 1 tablet (200 mg) by mouth 2 times daily   Quantity:  150 tablet   Refills:  0            Where to get your medicines      These medications were sent to [facility] [id number] [facility] - [place], [place] - [address]  [address] [place] [address]     Phone:  [phone]     lamoTRIgine 150 MG tablet         Some of these will need a paper prescription and others can be bought over the counter.  Ask your nurse if you have questions.     Bring a paper prescription for each of these medications     amphetamine-dextroamphetamine 20 MG per tablet    amphetamine-dextroamphetamine 20 MG per tablet    amphetamine-dextroamphetamine 20 MG per tablet                Primary Care Provider Office Phone # Fax #    AYSE Hamilton [phone]       [facility] [address]  [place] [facility] [address]         Equal Access to Services     [facility] : Hadii chetan stinson cristasarai Sotatum, waaxda luqadaha, qaybta kaalmacheng euceda, saida amezcua. So [facility] [phone].    ATENCIÓN: Si habla cyndi, tiene a sarkar disposición servicios gratuitos de asistencia lingüística. Minh al [phone].    We comply with applicable federal civil rights laws and [place] laws. We do not discriminate on the basis of race, color, national origin, age, disability, sex, sexual orientation, or gender identity.            Thank you!     Thank you for choosing PSYCHIATRY CLINIC  for your care. Our goal is always to provide you with excellent care. Hearing back from our patients is one way we can continue to improve our services. Please take a few minutes to complete the written survey that you may receive in the mail after your visit with us. Thank you!             Your Updated Medication List - Protect others around you: Learn how to safely use, store and throw away your medicines at www.disposemymeds.org.          This list is accurate as of 4/3/18 11:59 PM.  Always use your most recent med list.                   Brand Name Dispense Instructions for use Diagnosis    * amphetamine-dextroamphetamine 20 MG per tablet    ADDERALL    90 tablet    Take 2 tabs in the morning, 1 tab at noon.    Primary narcolepsy 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       * amphetamine-dextroamphetamine 20 MG per tablet    ADDERALL    90 tablet    Take 2 tabs in the morning, 1 tab at noon.    Primary narcolepsy 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       * amphetamine-dextroamphetamine 20 MG per tablet    ADDERALL    90 tablet    Take 2 tabs in the morning, 1 tab at noon.    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        lamoTRIgine 150 MG tablet    LaMICtal    90 tablet    Take 1 tablet (150 mg) by mouth daily    Major depressive disorder, recurrent episode, in full remission (H)       melatonin 5 MG tablet      Take 10 mg by mouth nightly as needed        TYLENOL 500 MG tablet   Generic drug:  acetaminophen      take 1-2 Tabs by mouth every 6 hours as needed.        ZYRTEC ALLERGY 10 MG tablet   Generic drug:  cetirizine      Take 10 mg by mouth daily        * Notice:  This list has 3 medication(s) that are the same as other medications prescribed for you. Read the directions carefully, and ask your doctor or other care provider to review them with you.

## 2018-04-04 ASSESSMENT — PATIENT HEALTH QUESTIONNAIRE - PHQ9: SUM OF ALL RESPONSES TO PHQ QUESTIONS 1-9: 0

## 2018-04-26 ENCOUNTER — OFFICE VISIT (OUTPATIENT)
Dept: DERMATOLOGY | Facility: CLINIC | Age: 38
End: 2018-04-26
Payer: COMMERCIAL

## 2018-04-26 DIAGNOSIS — L81.1 MELASMA: Primary | ICD-10-CM

## 2018-04-26 ASSESSMENT — PAIN SCALES - GENERAL: PAINLEVEL: NO PAIN (0)

## 2018-04-26 NOTE — NURSING NOTE
Dermatology Rooming Note    Lashonda Rodriguez's goals for this visit include:   Chief Complaint   Patient presents with     Derm Problem     Lashonda is here for a follow up. She states no changes since her last visit.      Rissa Marin LPN

## 2018-04-26 NOTE — MR AVS SNAPSHOT
After Visit Summary   4/26/2018    Lashonda Rodriguez    MRN: [id number]           Patient Information     Date Of Birth          1980        Visit Information        Provider Department      4/26/2018 1:45 PM Mike Castellanos MD [facility]        Today's Diagnoses     Melasma    -  1       Follow-ups after your visit        Follow-up notes from your care team     Return in about 6 months (around 10/26/2018).      Your next 10 appointments already scheduled     Oct 04, 2018 12:00 PM CDT   Adult Med Follow UP with AYSE Cuello CNP   Psychiatry Clinic ([facility])    [address]  [address] [address] [phone]              Who to contact     Please call your clinic at [phone] to:    Ask questions about your health    Make or cancel appointments    Discuss your medicines    Learn about your test results    Speak to your doctor            Additional Information About Your Visit        MyChart Information     [employer] gives you secure access to your electronic health record. If you see a primary care provider, you can also send messages to your care team and make appointments. If you have questions, please call your primary care clinic.  If you do not have a primary care provider, please call [phone] and they will assist you.      [employer] is an electronic gateway that provides easy, online access to your medical records. With [employer], you can request a clinic appointment, read your test results, renew a prescription or communicate with your care team.     To access your existing account, please contact your [facility] or call [phone] for assistance.        Care EveryWhere ID     This is your Care EveryWhere ID. This could be used by other organizations to access your [facility] medical records  [id number]         Blood Pressure from Last 3 Encounters:   01/19/17 118/76    03/09/15 100/62   09/12/10 98/66    Weight from Last 3 Encounters:   01/19/17 73.5 kg (162 lb)   03/09/15 67.7 kg (149 lb 3.2 oz)   09/12/10 68.9 kg (152 lb)              Today, you had the following     No orders found for display         Today's Medication Changes          These changes are accurate as of 4/26/18 11:59 PM.  If you have any questions, ask your nurse or doctor.               These medicines have changed or have updated prescriptions.        Dose/Directions    caffeine 200 MG Tabs tablet   Commonly known as:  NO-DOZE   This may have changed:  additional instructions   Used for:  Major depressive disorder, recurrent episode (H)        Dose:  200 mg   Take 1 tablet (200 mg) by mouth 2 times daily   Quantity:  150 tablet   Refills:  0                Primary Care Provider Office Phone # Fax #    Selma SUTTON Parth APRRADHA [phone]       [facility] [address]  [place] [address]        Equal Access to Services     [facility]: Hadii aad ku hadasho Soomaali, waaxda luqadaha, qaybta kaalmada adeegyada, waxay idiin haythai arriaga . So [facility] [phone].    ATENCIÓN: Si habla español, tiene a sarkar disposición servicios gratuitos de asistencia lingüística. [facility] [phone].    We comply with applicable federal civil rights laws and [place] laws. We do not discriminate on the basis of race, color, national origin, age, disability, sex, sexual orientation, or gender identity.            Thank you!     Thank you for choosing [facility]  for your care. Our goal is always to provide you with excellent care. Hearing back from our patients is one way we can continue to improve our services. Please take a few minutes to complete the written survey that you may receive in the mail after your visit with us. Thank you!             Your Updated Medication List - Protect others around you: Learn how to safely use, store and throw away your medicines at  www.disposemymeds.org.          This list is accurate as of 4/26/18 11:59 PM.  Always use your most recent med list.                   Brand Name Dispense Instructions for use Diagnosis    * amphetamine-dextroamphetamine 20 MG per tablet    ADDERALL    90 tablet    Take 2 tabs in the morning, 1 tab at noon.    Primary narcolepsy 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       * amphetamine-dextroamphetamine 20 MG per tablet    ADDERALL    90 tablet    Take 2 tabs in the morning, 1 tab at noon.    Primary narcolepsy 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       * amphetamine-dextroamphetamine 20 MG per tablet    ADDERALL    90 tablet    Take 2 tabs in the morning, 1 tab at noon.    Primary narcolepsy 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       caffeine 200 MG Tabs tablet    NO-DOZE    150 tablet    Take 1 tablet (200 mg) by mouth 2 times daily    Major depressive disorder, recurrent episode (H)       fluocin-hydroquinone-tretinoin 0.01-4-0.05 % Crea     30 g    Twice daily To the face    Melasma       hydroquinone 4 % Crea     30 g    Externally apply topically daily In the morning    Melasma       ibuprofen 200 MG capsule      take 200-400 mg by mouth every 6 hours as needed.        lamoTRIgine 150 MG tablet    LaMICtal    90 tablet    Take 1 tablet (150 mg) by mouth daily    Major depressive disorder, recurrent episode, in full remission (H)       melatonin 5 MG tablet      Take 10 mg by mouth nightly as needed        TYLENOL 500 MG tablet   Generic drug:  acetaminophen      take 1-2 Tabs by mouth every 6 hours as needed.        VITAMIN B 12 PO           ZYRTEC ALLERGY 10 MG tablet   Generic drug:  cetirizine      Take 10 mg by mouth daily        * Notice:  This list has 3 medication(s) that are the same as other medications prescribed for you. Read the directions carefully, and ask your doctor or other care provider to review them with you.

## 2018-04-26 NOTE — PROGRESS NOTES
[facility] Dermatology Note      Dermatology Problem List:  1. Melasma - improving on Tri-Celia, started 2/22/2018    Encounter Date: Apr 26, 2018    CC:   Chief Complaint   Patient presents with     Derm Problem     Lashonda is here for a follow up. She states no changes since her last visit.        History of Present Illness:  Ms. Lashonda Rodriguez is a 38 year old female who presents as a follow-up for melasma. The patient was last seen 2/22/2018 when Tri-Celia was restarted. She is currently using Tri-Celia on her whole face once a day and additional application on the most affected areas. She noticed rapid improvement when she started to  apply the Tri-Celia twice a day. Noticed some dryness of her skin and a lot more shine. She went on a cruise recently and was diligent about sun protection with barrier sunscreen and a hat but still noticed darkening. She still has Mirena and is not interested in stopping.     Past Medical History:   Patient Active Problem List   Diagnosis     Major depressive disorder, recurrent episode (H)     Narcolepsy     Bladder spasms     Exercise-induced asthma     CARDIOVASCULAR SCREENING; LDL GOAL LESS THAN 160     Impulse control disorder     Attention deficit disorder     Hx of psychiatric care     Melasma     Past Medical History:   Diagnosis Date     Anxiety      Bladder spasms      Depression 01/05/2009     Exercise-induced asthma 06/11/2009     Narcolepsy 12/28/2009     Seizure (H) 2 approx [id number]    Secondary to Wellbutrin and stimulant use     Past Surgical History:   Procedure Laterality Date     ECTOPIC PREGNANCY SURGERY       HC TOOTH EXTRACTION W/FORCEP       Social History:  The patient works as a  at a pharmaceutical company.     Family History:  No family history of skin cancer or melasma.     Medications:  Current Outpatient Prescriptions   Medication Sig Dispense Refill     acetaminophen (TYLENOL) 500 MG tablet take 1-2 Tabs  by mouth every 6 hours as needed.       amphetamine-dextroamphetamine (ADDERALL) 20 MG per tablet Take 2 tabs in the morning, 1 tab at noon. 90 tablet 0     amphetamine-dextroamphetamine (ADDERALL) 20 MG per tablet Take 2 tabs in the morning, 1 tab at noon. 90 tablet 0     amphetamine-dextroamphetamine (ADDERALL) 20 MG per tablet Take 2 tabs in the morning, 1 tab at noon. 90 tablet 0     caffeine (NO-DOZE) 200 MG TABS Take 1 tablet (200 mg) by mouth 2 times daily (Patient taking differently: Take 200 mg by mouth 2 times daily 400 mg qam and 200 qnoon) 150 tablet      cetirizine (ZYRTEC ALLERGY) 10 MG tablet Take 10 mg by mouth daily       Cyanocobalamin (VITAMIN B 12 PO)        fluocin-hydroquinone-tretinoin 0.01-4-0.05 % CREA Twice daily To the face 30 g 2     Ibuprofen 200 MG capsule take 200-400 mg by mouth every 6 hours as needed.       lamoTRIgine (LAMICTAL) 150 MG tablet Take 1 tablet (150 mg) by mouth daily 90 tablet 0     melatonin 5 MG tablet Take 10 mg by mouth nightly as needed        hydroquinone 4 % CREA Externally apply topically daily In the morning (Patient not taking: Reported on 2/22/2018) 30 g 2        Allergies   Allergen Reactions     Wellbutrin [Bupropion] Other (See Comments)     Two seizures while on Adderall and Wellbutrin     Penicillins Rash     Review of Systems:  -Constitutional: The patient denies fatigue, fevers, chills, unintended weight loss, and night sweats.  -HEENT: Patient denies nonhealing oral sores.  -Skin: As above in HPI. No additional skin concerns.    Physical exam:  Vitals: There were no vitals taken for this visit.  GEN: This is a well developed, well-nourished female in no acute distress, in a pleasant mood.    SKIN: Focused examination of the face was performed.  - Very little macular hyperpigmentation in a somewhat dot-like pattern on the lateral aspects of her forehead, temples and cheeks, barely visible on exam today   - No other lesions of concern on areas  examined.     Impression/Plan:  1. Melasma - Significantly improved since restarting Tri-Celia which patient would like to continue. We again discussed the association with hormonal contraceptives such as Mirena, but she is still not interested in stopping Mirena at this time.    Continue Tri-Celia 1-2 times daily through the summer    Discussed taking breaks such as stopping over the fall and winter to minimize risks of paradoxical hyperpigmentation     Reviewed importance of strict photoprotection with sun protective clothing and physical sunscreen    Follow-up in 4 months, earlier for new or changing lesions.     Staff Involved:  Scribed by Carrie Moore, MS3 for Dr. Mike Castellanos.        The medical student acted as a scribe with respect to this patient.  I have performed all the key elements of the history and physical.    Mike Castellanos MD  Dermatology Attending

## 2018-04-26 NOTE — LETTER
4/26/2018       RE: Lashonda Rodriguez  [address]  [facility] [address]     Dear Colleague,    Thank you for referring your patient, Lashonda Rodriguez, to the [facility] DERMAT[facility] at [facility]. Please see a copy of my visit note below.    [facility] Dermatology Note      Dermatology Problem List:  1. Melasma - improving on Tri-Celia, started 2/22/2018    Encounter Date: Apr 26, 2018    CC:   Chief Complaint   Patient presents with     Derm Problem     Lashonda is here for a follow up. She states no changes since her last visit.        History of Present Illness:  Ms. Lashonda Rodriguez is a 38 year old female who presents as a follow-up for melasma. The patient was last seen 2/22/2018 when Tri-Celia was restarted. She is currently using Tri-Celia on her whole face once a day and additional application on the most affected areas. She noticed rapid improvement when she started to  apply the Tri-Celia twice a day. Noticed some dryness of her skin and a lot more shine. She went on a cruise recently and was diligent about sun protection with barrier sunscreen and a hat but still noticed darkening. She still has Mirena and is not interested in stopping.     Past Medical History:   Patient Active Problem List   Diagnosis     Major depressive disorder, recurrent episode (H)     Narcolepsy     Bladder spasms     Exercise-induced asthma     CARDIOVASCULAR SCREENING; LDL GOAL LESS THAN 160     Impulse control disorder     Attention deficit disorder     Hx of psychiatric care     Melasma     Past Medical History:   Diagnosis Date     Anxiety      Bladder spasms      Depression 01/05/2009     Exercise-induced asthma 06/11/2009     Narcolepsy 12/28/2009     Seizure (H) 2 approx [id number]    Secondary to Wellbutrin and stimulant use     Past Surgical History:   Procedure Laterality Date     ECTOPIC PREGNANCY SURGERY       HC TOOTH EXTRACTION W/FORCEP        Social History:  The patient works as a  at a pharmaceutical company.     Family History:  No family history of skin cancer or melasma.     Medications:  Current Outpatient Prescriptions   Medication Sig Dispense Refill     acetaminophen (TYLENOL) 500 MG tablet take 1-2 Tabs by mouth every 6 hours as needed.       amphetamine-dextroamphetamine (ADDERALL) 20 MG per tablet Take 2 tabs in the morning, 1 tab at noon. 90 tablet 0     amphetamine-dextroamphetamine (ADDERALL) 20 MG per tablet Take 2 tabs in the morning, 1 tab at noon. 90 tablet 0     amphetamine-dextroamphetamine (ADDERALL) 20 MG per tablet Take 2 tabs in the morning, 1 tab at noon. 90 tablet 0     caffeine (NO-DOZE) 200 MG TABS Take 1 tablet (200 mg) by mouth 2 times daily (Patient taking differently: Take 200 mg by mouth 2 times daily 400 mg qam and 200 qnoon) 150 tablet      cetirizine (ZYRTEC ALLERGY) 10 MG tablet Take 10 mg by mouth daily       Cyanocobalamin (VITAMIN B 12 PO)        fluocin-hydroquinone-tretinoin 0.01-4-0.05 % CREA Twice daily To the face 30 g 2     Ibuprofen 200 MG capsule take 200-400 mg by mouth every 6 hours as needed.       lamoTRIgine (LAMICTAL) 150 MG tablet Take 1 tablet (150 mg) by mouth daily 90 tablet 0     melatonin 5 MG tablet Take 10 mg by mouth nightly as needed        hydroquinone 4 % CREA Externally apply topically daily In the morning (Patient not taking: Reported on 2/22/2018) 30 g 2        Allergies   Allergen Reactions     Wellbutrin [Bupropion] Other (See Comments)     Two seizures while on Adderall and Wellbutrin     Penicillins Rash     Review of Systems:  -Constitutional: The patient denies fatigue, fevers, chills, unintended weight loss, and night sweats.  -HEENT: Patient denies nonhealing oral sores.  -Skin: As above in HPI. No additional skin concerns.    Physical exam:  Vitals: There were no vitals taken for this visit.  GEN: This is a well developed, well-nourished female in no  acute distress, in a pleasant mood.    SKIN: Focused examination of the face was performed.  - Very little macular hyperpigmentation in a somewhat dot-like pattern on the lateral aspects of her forehead, temples and cheeks, barely visible on exam today   - No other lesions of concern on areas examined.     Impression/Plan:  1. Melasma - Significantly improved since restarting Tri-Celia which patient would like to continue. We again discussed the association with hormonal contraceptives such as Mirena, but she is still not interested in stopping Mirena at this time.    Continue Tri-Celia 1-2 times daily through the summer    Discussed taking breaks such as stopping over the fall and winter to minimize risks of paradoxical hyperpigmentation     Reviewed importance of strict photoprotection with sun protective clothing and physical sunscreen    Follow-up in 4 months, earlier for new or changing lesions.     Staff Involved:  Scribed by Carrie Moore, MS3 for Dr. Mike Castellanos.      The medical student acted as a scribe with respect to this patient.  I have performed all the key elements of the history and physical.    Mike Castellanos MD  Dermatology Attending

## 2018-08-07 ENCOUNTER — TELEPHONE (OUTPATIENT)
Dept: PSYCHIATRY | Facility: CLINIC | Age: 38
End: 2018-08-07

## 2018-08-07 DIAGNOSIS — G47.419 PRIMARY NARCOLEPSY WITHOUT CATAPLEXY: ICD-10-CM

## 2018-08-07 NOTE — TELEPHONE ENCOUNTER
Health Call Center    Phone Message    May a detailed message be left on voicemail: yes    Reason for Call: Medication Refill Request    Has the patient contacted the pharmacy for the refill? Yes   [facility]  Provider who prescribed the medication: Farzaneh Hicks  Date medication is needed: ASAP. Has 3-4 days left. Pt stated she would like to have it mailed to [address] [facility] [address]. Please lvm once prescription has been mailed.           Action Taken: Message routed to:  Other: Rivka Ratliff

## 2018-08-09 RX ORDER — DEXTROAMPHETAMINE SACCHARATE, AMPHETAMINE ASPARTATE, DEXTROAMPHETAMINE SULFATE AND AMPHETAMINE SULFATE 5; 5; 5; 5 MG/1; MG/1; MG/1; MG/1
TABLET ORAL
Qty: 90 TABLET | Refills: 0 | Status: SHIPPED | OUTPATIENT
Start: 2018-08-09 | End: 2018-10-04

## 2018-08-09 RX ORDER — DEXTROAMPHETAMINE SACCHARATE, AMPHETAMINE ASPARTATE, DEXTROAMPHETAMINE SULFATE AND AMPHETAMINE SULFATE 5; 5; 5; 5 MG/1; MG/1; MG/1; MG/1
TABLET ORAL
Qty: 90 TABLET | Refills: 0 | Status: SHIPPED | OUTPATIENT
Start: 2018-09-06 | End: 2018-10-04

## 2018-08-09 NOTE — TELEPHONE ENCOUNTER
Last seen: 4/3/18  RTC: 6 months  Cancel: none  No-show: none  Next appt: 10/4/18     Medication requested: amphetamine-dextroamphetamine (ADDERALL) 20 MG per tablet  Directions: Take 2 tabs in the morning, 1 tab at noon  Qty: 90  Last refilled: 7/15/18, 6/13/18, and 5/14/18 per [place]      Medication refill approved per refill protocol  Two scripts printed to get pt by to upcoming appt on 10/4  Message routed to provider and scripts placed in folder for signature

## 2018-08-10 NOTE — TELEPHONE ENCOUNTER
-Received signed scripts from provider. Called pt and LVM informing her these will be mailed today.

## 2018-10-04 ENCOUNTER — OFFICE VISIT (OUTPATIENT)
Dept: PSYCHIATRY | Facility: CLINIC | Age: 38
End: 2018-10-04
Attending: NURSE PRACTITIONER
Payer: COMMERCIAL

## 2018-10-04 DIAGNOSIS — F33.42 MAJOR DEPRESSIVE DISORDER, RECURRENT EPISODE, IN FULL REMISSION (H): ICD-10-CM

## 2018-10-04 DIAGNOSIS — G47.419 PRIMARY NARCOLEPSY WITHOUT CATAPLEXY: ICD-10-CM

## 2018-10-04 RX ORDER — LAMOTRIGINE 150 MG/1
150 TABLET ORAL DAILY
Qty: 90 TABLET | Refills: 1 | Status: SHIPPED | OUTPATIENT
Start: 2018-10-04 | End: 2019-04-04

## 2018-10-04 RX ORDER — DEXTROAMPHETAMINE SACCHARATE, AMPHETAMINE ASPARTATE, DEXTROAMPHETAMINE SULFATE AND AMPHETAMINE SULFATE 5; 5; 5; 5 MG/1; MG/1; MG/1; MG/1
TABLET ORAL
Qty: 90 TABLET | Refills: 0 | Status: SHIPPED | OUTPATIENT
Start: 2018-10-04 | End: 2018-12-13

## 2018-10-04 NOTE — PROGRESS NOTES
Psychiatry Clinic Progress Note                                                                   Lashonda Rodriguez is a 38 year old female who prefers the name Josi and pronouns she, her, hers, herself.  Therapist: None  PCP: Selma Alba  Other Providers: None    PREVIOUS PSYCH MED TRIALS:  - Provigil (limited effect, 2-3 month trial)  - Nuvigil 250mg (trialed in 2013-ineffective)  - buspar (unknown)  - Wellbutrin XL (taken with Adderall, two seizures in 2009/2010)  - AMT 10mg for skin picking (unknown)  - NAC (ineffective, unsure about her compliance)    Pertinent Background:  See previous notes.  Psych critical item history includes [no critical items].     Interim History                                                                                                        4, 4     The patient is a fair historian, reports good treatment adherence and was last seen 4/3/2018 when she chose to continue lamotrigine 150mg daily with Adderall 40mg QAM, 20mg QNoon.    Since the last visit, she's been OK overall.  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  - Wants to find out if she needs training to be more versatile? Considers going back to school for  but knows of the cost and time involved  - fears her job will become boring- she likes innovating new solutions  - raising two daughters who are doing well  - less persistent conflict with her  and two daughters  - planning a cruise through the [place] with her daughters and her Mom after [place]  - writing lots of notes to herself to remember her tasks and priorities, newly taking B12    Recent Symptoms:   Depression: denies  Anxiety:  none; no report of skin picking today    ADVERSE EFFECTS:  none  MEDICAL CONCERNS: none today    APPETITE: OK, weight stable, eats vegan over the last 1.5 years (protein shakes, PB, leagy greens with CSA) and vegetarian over the last 15 years  SLEEP: stays awake later at night, averaging 7 hours nightly     Recent Substance Use:  Alcohol- limits, prefers craft beer  and Caffeine- cafffeine pills [takes No Doze with Adderall for narcolepsy]     Per 9/14/2016 note: history of alcohol, cannabis, meth abuse, treatment about 16yo in 1997 for cannabis        Social/ Family History                                  [per patient report]                                 1ea,1ea     FINANCIAL SUPPORT- working and family or friend       CHILDREN- two daughters (15yo, 17yo)       LIVING SITUATION- lives with  of 3 years (partner for 13 years) and two daughters      LEGAL- None  EARLY HISTORY/ EDUCATION- Grew up in [place] with half siblings. BS in Biology from [employer]  SOCIAL/ SPIRITUAL SUPPORT- support from family, friends       CULTURAL INFLUENCES/ IMPACT- UNKNOWN       TRAUMA HISTORY (self-report)- None  FEELS SAFE AT HOME- Yes  FAMILY HISTORY-  UNKNOWN    Medical / Surgical History                                                                                                                  Patient Active Problem List   Diagnosis     Major depressive disorder, recurrent episode (H)     Narcolepsy     Bladder spasms     Exercise-induced asthma     CARDIOVASCULAR SCREENING; LDL GOAL LESS THAN 160     Impulse control disorder     Attention deficit disorder     Hx of psychiatric care     Melasma       Past Surgical History:   Procedure Laterality Date     ECTOPIC PREGNANCY SURGERY       HC TOOTH EXTRACTION W/FORCEP        Medical Review of Systems                                                                                                    2,10     Pregnant- No    Breastfeeding- No    Contraception- IUD  A comprehensive review of systems was performed and is negative other  than noted in the HPI.  Denies head trauma, LOC.     While taking Wellbutrin, she reports history of general tonic clonic seizure 9/20/2009 without aura with postictal confusion; second seizure while with SO on 1/23/10 (tonic clonic; foaming at the mouth with post ictal confusion and lethargy. Narcolepsy diagnosed about 2004. Remote history of febrile convulsion before [age] yo, her daughter has a history of febrile convulsions.     Allergy                                Wellbutrin [bupropion] and Penicillins  Current Medications                                                                                                       Current Outpatient Prescriptions   Medication Sig Dispense Refill     acetaminophen (TYLENOL) 500 MG tablet take 1-2 Tabs by mouth every 6 hours as needed.       amphetamine-dextroamphetamine (ADDERALL) 20 MG per tablet Take 2 tabs in the morning, 1 tab at noon. 90 tablet 0     amphetamine-dextroamphetamine (ADDERALL) 20 MG per tablet Take 2 tabs in the morning, 1 tab at noon. 90 tablet 0     caffeine (NO-DOZE) 200 MG TABS Take 1 tablet (200 mg) by mouth 2 times daily (Patient taking differently: Take 200 mg by mouth 2 times daily 400 mg qam and 200 qnoon) 150 tablet      cetirizine (ZYRTEC ALLERGY) 10 MG tablet Take 10 mg by mouth daily       Cyanocobalamin (VITAMIN B 12 PO)        fluocin-hydroquinone-tretinoin 0.01-4-0.05 % CREA Twice daily To the face 30 g 2     hydroquinone 4 % CREA Externally apply topically daily In the morning (Patient not taking: Reported on 2/22/2018) 30 g 2     Ibuprofen 200 MG capsule take 200-400 mg by mouth every 6 hours as needed.       lamoTRIgine (LAMICTAL) 150 MG tablet Take 1 tablet (150 mg) by mouth daily 90 tablet 0     melatonin 5 MG tablet Take 10 mg by mouth nightly as needed        Vitals                                                                                                                       3, 3   There were no vitals taken for this  visit.     HR: 120/72, BP: 90    Weight: 164.2#  Mental Status Exam                                                                                    9, 14 cog gs     Alertness: alert  and oriented  Appearance: casually groomed  Behavior/Demeanor: cooperative, pleasant and calm, with good  eye contact   Speech: normal and regular rate and rhythm  Language: intact  Psychomotor: normal or unremarkable  Mood: description consistent with euthymia  Affect: full range and appropriate; was congruent to mood; was congruent to content  Thought Process/Associations: unremarkable  Thought Content:  Reports none;  Denies suicidal ideation, violent ideation, delusions, preoccupations, obsessions , phobia , magical thinking, over-valued ideas and paranoid ideation  Perception:  Reports none;  Denies auditory hallucinations, visual hallucinations, visual distortion seen as shadows , depersonalization and derealization  Insight: fair  Judgment: good  Cognition: (6) does  appear grossly intact; formal cognitive testing was not done  Gait/Station and/or Muscle Strength/Tone: unremarkable    Labs and Data                                                                                                                 Rating Scales:    PHQ9    PHQ9 Today: 1  PHQ-9 SCORE 10/5/2017 1/11/2018 4/3/2018   Total Score - - -   Total Score 3 0 0     Diagnosis and Assessment                                                                             m2, h3     DIAGNOSIS:   primary narcolepsy 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 (diagnosed by sleep study in 2007)  recurrent MDD in remission  - h/o impulse control disorder  - skin picking since 20yo    Today the following issues were addressed:    1) meds, doses  2) family therapy at [facility]  3) stimulant refills    [place] Prescription Monitoring Program [] was checked today:  indicates Adderall 20mg #90 last filled by writer 9/11/18.    PSYCHOTROPIC DRUG INTERACTIONS: LAMOTRIGINE, TYLENOL (may  result in decreased lamotrigine effectiveness).  Drug Interaction Management: Monitoring for adverse effects, routine vitals, using lowest therapeutic dose of [psychotropics] and patient is aware of risks    Plan                                                                                                                    m2, h3      1) she chooses to continue Adderall 40mg QAM, 20mg Qnoon; two months RFs provided today, she will call for refill  - takes melatonin 10mg at bedtime PRN  - takes No Doze 200mg BID     2) active in family therapy at [facility]     3) she will contact office for  and refill of Adderall    RTC: 6 months, sooner as needed    CRISIS NUMBERS:   Provided routinely in AVS.    Treatment Risk Statement:  The patient understands the risks, benefits, adverse effects and alternatives. Agrees to treatment with the capacity to do so. No medical contraindications to treatment. Agrees to call clinic for any problems. The patient understands to call 911 or go to the nearest ED if life threatening or urgent symptoms occur.     PROVIDER:  AYSE Dumont CNP

## 2018-10-04 NOTE — NURSING NOTE
Patient declined vitals taken by rooming staff, would prefer vitals taken by the provider, writer notified the provider.  Ava Solis, CMA

## 2018-10-04 NOTE — MR AVS SNAPSHOT
After Visit Summary   10/4/2018    Lashonda Rodriguez    MRN: [id number]           Patient Information     Date Of Birth          1980        Visit Information        Provider Department      10/4/2018 12:00 PM Farzaneh Hicks APRN CNP Psychiatry Clinic        Today's Diagnoses     Major depressive disorder, recurrent episode, in full remission (H)        Primary narcolepsy 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           Follow-ups after your visit        Follow-up notes from your care team     Return in about 6 months (around 4/4/2019), or if symptoms worsen or fail to improve, for BP Recheck, Routine Visit.      Your next 10 appointments already scheduled     Apr 04, 2019 12:00 PM CDT   Adult Med Follow UP with AYSE Cuello CNP   Psychiatry Clinic ([facility])    [place] [id number]  [address] [address] [phone]              Who to contact     Please call your clinic at [phone] to:    Ask questions about your health    Make or cancel appointments    Discuss your medicines    Learn about your test results    Speak to your doctor            Additional Information About Your Visit        MyChart Information     [employer] gives you secure access to your electronic health record. If you see a primary care provider, you can also send messages to your care team and make appointments. If you have questions, please call your primary care clinic.  If you do not have a primary care provider, please call [phone] and they will assist you.      [employer] is an electronic gateway that provides easy, online access to your medical records. With [employer], you can request a clinic appointment, read your test results, renew a prescription or communicate with your care team.     To access your existing account, please contact your [facility] or call [phone] for assistance.        Care EveryWhere ID     This is your Care  EveryWhere ID. This could be used by other organizations to access your [facility] medical records  [id number]         Blood Pressure from Last 3 Encounters:   01/11/18 114/71   10/05/17 110/66   03/24/17 109/62    Weight from Last 3 Encounters:   01/11/18 69.9 kg (154 lb)   10/05/17 71.3 kg (157 lb 3.2 oz)   03/24/17 72 kg (158 lb 12.8 oz)              Today, you had the following     No orders found for display         Today's Medication Changes          These changes are accurate as of 10/4/18 11:59 PM.  If you have any questions, ask your nurse or doctor.               These medicines have changed or have updated prescriptions.        Dose/Directions    caffeine 200 MG Tabs tablet   Commonly known as:  NO-DOZE   This may have changed:  additional instructions   Used for:  Major depressive disorder, recurrent episode (H)        Dose:  200 mg   Take 1 tablet (200 mg) by mouth 2 times daily   Quantity:  150 tablet   Refills:  0            Where to get your medicines      These medications were sent to [place] [address] [place] [facility], [place] - [address]  [address] [facility] [address]     Phone:  [phone]     lamoTRIgine 150 MG tablet         Some of these will need a paper prescription and others can be bought over the counter.  Ask your nurse if you have questions.     Bring a paper prescription for each of these medications     amphetamine-dextroamphetamine 20 MG per tablet    amphetamine-dextroamphetamine 20 MG per tablet                Primary Care Provider Office Phone # Fax #    AYSE Hamilton [phone]       [address] [place] [address]        Equal Access to Services     [facility]ON [facility] Center[facility]MAN : Hadii chetan bartholomew Sotatum, waaxda luqadaha, qaybta kaalmada adeegyada, saida amezcua. So [facility] [phone].    ATENCIÓN: Si habla español, tiene a sarkar disposición servicios gratuitos de asistencia lingüística.  Miguelbill edwards [phone].    We comply with applicable federal civil rights laws and [place] laws. We do not discriminate on the basis of race, color, national origin, age, disability, sex, sexual orientation, or gender identity.            Thank you!     Thank you for choosing PSYCHIATRY CLINIC  for your care. Our goal is always to provide you with excellent care. Hearing back from our patients is one way we can continue to improve our services. Please take a few minutes to complete the written survey that you may receive in the mail after your visit with us. Thank you!             Your Updated Medication List - Protect others around you: Learn how to safely use, store and throw away your medicines at www.disposemymeds.org.          This list is accurate as of 10/4/18 11:59 PM.  Always use your most recent med list.                   Brand Name Dispense Instructions for use Diagnosis    * amphetamine-dextroamphetamine 20 MG per tablet    ADDERALL    90 tablet    Take 2 tabs in the morning, 1 tab at noon.    Primary narcolepsy 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       * amphetamine-dextroamphetamine 20 MG per tablet    ADDERALL    90 tablet    Take 2 tabs in the morning, 1 tab at noon.    Primary narcolepsy 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       caffeine 200 MG Tabs tablet    NO-DOZE    150 tablet    Take 1 tablet (200 mg) by mouth 2 times daily    Major depressive disorder, recurrent episode (H)       fluocin-hydroquinone-tretinoin 0.01-4-0.05 % Crea     30 g    Twice daily To the face    Melasma       hydroquinone 4 % Crea     30 g    Externally apply topically daily In the morning    Melasma       ibuprofen 200 MG capsule      take 200-400 mg by mouth every 6 hours as needed.        lamoTRIgine 150 MG tablet    LaMICtal    90 tablet    Take 1 tablet (150 mg) by mouth daily    Major depressive disorder, recurrent episode, in full remission (H)       melatonin 5 MG tablet      Take 10 mg by mouth nightly as needed        TYLENOL 500 MG tablet    Generic drug:  acetaminophen      take 1-2 Tabs by mouth every 6 hours as needed.        VITAMIN B 12 PO           ZYRTEC ALLERGY 10 MG tablet   Generic drug:  cetirizine      Take 10 mg by mouth daily        * Notice:  This list has 2 medication(s) that are the same as other medications prescribed for you. Read the directions carefully, and ask your doctor or other care provider to review them with you.

## 2018-10-05 ASSESSMENT — PATIENT HEALTH QUESTIONNAIRE - PHQ9: SUM OF ALL RESPONSES TO PHQ QUESTIONS 1-9: 1

## 2018-10-16 ENCOUNTER — TELEPHONE (OUTPATIENT)
Dept: PSYCHIATRY | Facility: CLINIC | Age: 38
End: 2018-10-16

## 2018-12-13 ENCOUNTER — TELEPHONE (OUTPATIENT)
Dept: PSYCHIATRY | Facility: CLINIC | Age: 38
End: 2018-12-13

## 2018-12-13 DIAGNOSIS — G47.419 PRIMARY NARCOLEPSY WITHOUT CATAPLEXY: ICD-10-CM

## 2018-12-13 RX ORDER — DEXTROAMPHETAMINE SACCHARATE, AMPHETAMINE ASPARTATE, DEXTROAMPHETAMINE SULFATE AND AMPHETAMINE SULFATE 5; 5; 5; 5 MG/1; MG/1; MG/1; MG/1
TABLET ORAL
Qty: 90 TABLET | Refills: 0 | Status: SHIPPED | OUTPATIENT
Start: 2019-01-09 | End: 2019-02-07

## 2018-12-13 RX ORDER — DEXTROAMPHETAMINE SACCHARATE, AMPHETAMINE ASPARTATE, DEXTROAMPHETAMINE SULFATE AND AMPHETAMINE SULFATE 5; 5; 5; 5 MG/1; MG/1; MG/1; MG/1
TABLET ORAL
Qty: 90 TABLET | Refills: 0 | Status: SHIPPED | OUTPATIENT
Start: 2018-12-13 | End: 2019-02-07

## 2018-12-13 NOTE — TELEPHONE ENCOUNTER
Last seen: 10/4/18  RTC: 6 months   Cancel: none   No-show: none   Next appt: 4/4/18    Incoming refill from [facility] via phone     Medication requested: amphetamine-dextroamphetamine (ADDERALL) 20 MG per tablet  Directions:Take 2 tabs in the morning, 1 tab at noon   Qty: 90  Last refilled: 10/4/18    Medication refill approved per refill protocol  30 day supply with 1 additional refill completed   Med tab changed to reflect this   Script printed and placed in providers folder to be signed

## 2018-12-13 NOTE — TELEPHONE ENCOUNTER
[facility] Call Center    Phone Message    May a detailed message be left on voicemail: yes    Reason for Call: Medication Refill Request    Has the patient contacted the pharmacy for the refill? Yes   Name of medication being requested: Adderall 20 mg ,3 per day  Provider who prescribed the medication: Farzaneh Hicks  Date medication is needed: ASAP. She has two days left       Action Taken: Other: Sharlene Lyman

## 2018-12-13 NOTE — TELEPHONE ENCOUNTER
- Writer received signed script from provider     Placed a call to patient at [phone]   - Notified of the script ready to be picked up   - Patient will  the script today or tomorrow   - Script placed in a envelope and placed at    - No further action needed by this writer

## 2019-01-10 NOTE — TELEPHONE ENCOUNTER
Last Seen 10/4/2018  RTC 6 months  Cancel 0  No-Show 0    Next Appt 4/4/2019    Incoming Refill From [facility] via fax    Medication Requested  lamoTRIgine (LAMICTAL) 150 MG tablet    Directions Take 1 tablet (150 mg) by mouth daily - Oral    Qty 90    Last Refill 1/8/2019       Patient picked up medication on 1/8/2019 from [facility]. This request is to dispense future medication at new Pharmacy d/t pt's insurance. [facility]     .Ashley Champagne LPN

## 2019-02-06 DIAGNOSIS — G47.419 PRIMARY NARCOLEPSY WITHOUT CATAPLEXY: ICD-10-CM

## 2019-02-06 NOTE — TELEPHONE ENCOUNTER
[facility] Call Center    Phone Message    May a detailed message be left on voicemail: yes    Reason for Call: Medication Refill Request    Has the patient contacted the pharmacy for the refill? Yes   Name of medication being requested: Adderall  Provider who prescribed the medication: Farzanhe Hicks  Pharmacy: Please mail the script, but call the pt first  Date medication is needed: ASAP. She has two days of meds left         Action Taken: Other: Sharlene Lyman

## 2019-02-06 NOTE — TELEPHONE ENCOUNTER
Last seen: 10/4/18  RTC: 6 months   Cancel: none   No-show: none   Next appt: 4/14/19    Incoming refill from patient via phone     Medication requested:amphetamine-dextroamphetamine (ADDERALL) 20 MG tablet   Directions: Take 2 tabs in the morning, 1 tab at noon  Qty: 60  Last refilled: 1/14/19, 12/14/19, 11/14/19    Writer placed a call to patient to confirm the amount of medication available. At this time patient has one week of medications left and will need refills. Will route to provider.

## 2019-02-07 RX ORDER — DEXTROAMPHETAMINE SACCHARATE, AMPHETAMINE ASPARTATE, DEXTROAMPHETAMINE SULFATE AND AMPHETAMINE SULFATE 5; 5; 5; 5 MG/1; MG/1; MG/1; MG/1
TABLET ORAL
Qty: 90 TABLET | Refills: 0 | Status: SHIPPED | OUTPATIENT
Start: 2019-03-11 | End: 2019-04-04

## 2019-02-07 RX ORDER — DEXTROAMPHETAMINE SACCHARATE, AMPHETAMINE ASPARTATE, DEXTROAMPHETAMINE SULFATE AND AMPHETAMINE SULFATE 5; 5; 5; 5 MG/1; MG/1; MG/1; MG/1
TABLET ORAL
Qty: 90 TABLET | Refills: 0 | Status: SHIPPED | OUTPATIENT
Start: 2019-02-11 | End: 2019-04-04

## 2019-02-07 NOTE — TELEPHONE ENCOUNTER
-Received two signed scripts back from Farzaneh Hicks.     -Placed phone call to pt at [phone]. Confirmed she would like scripts mailed to her home address. Verified address.     -Placed both prescriptions in an envelope addressed to pt's home. Placed in outgoing mail.

## 2019-02-07 NOTE — TELEPHONE ENCOUNTER
Authorization from Farzaneh Hicks to provide two 30-day refills of medication:    Farzaneh Hicks APRN CNP Patel, Radhika, RN 2 hours ago (11:00 AM)      Yes! (Routing comment)    -Printed two scripts with start dates of 2/11/19 and 3/11/19. Placed in Farzaneh Hicks's folder for review and signature.

## 2019-02-27 DIAGNOSIS — L81.1 MELASMA: ICD-10-CM

## 2019-02-28 NOTE — TELEPHONE ENCOUNTER
fluocin-hydroquinone-tretinoin 0.01-4-0.05 % CREA   Last Written Prescription Date:  2/22/18  Last Fill Quantity: 30g,   # refills: 2  Last Office Visit :4/26/18  Future Office visit:  None    F/u 4 mths    Scheduling has been notified to contact the pt for appointment.    Routing refill request to provider for review/approval because: not on protocol

## 2019-02-28 NOTE — TELEPHONE ENCOUNTER
Medication refill request received and reviewed. Patient requesting refill of triluma.  Last visit from 04/2018 was reviewed. At that point, plan was to continue on triluma twice daily in summer.  Will refill medication as previously prescribed with enough refills to bridge to f/u appointment on 04/2019.     Isabel Olmstead  PGY-2 Dermatology Resident  [facility] Department of Dermatology   [phone]

## 2019-04-04 ENCOUNTER — OFFICE VISIT (OUTPATIENT)
Dept: PSYCHIATRY | Facility: CLINIC | Age: 39
End: 2019-04-04
Attending: NURSE PRACTITIONER
Payer: COMMERCIAL

## 2019-04-04 DIAGNOSIS — G47.419 PRIMARY NARCOLEPSY WITHOUT CATAPLEXY: ICD-10-CM

## 2019-04-04 DIAGNOSIS — F33.42 MAJOR DEPRESSIVE DISORDER, RECURRENT EPISODE, IN FULL REMISSION (H): ICD-10-CM

## 2019-04-04 RX ORDER — DEXTROAMPHETAMINE SACCHARATE, AMPHETAMINE ASPARTATE, DEXTROAMPHETAMINE SULFATE AND AMPHETAMINE SULFATE 5; 5; 5; 5 MG/1; MG/1; MG/1; MG/1
TABLET ORAL
Qty: 90 TABLET | Refills: 0 | Status: SHIPPED | OUTPATIENT
Start: 2019-04-04 | End: 2019-06-12

## 2019-04-04 RX ORDER — DEXTROAMPHETAMINE SACCHARATE, AMPHETAMINE ASPARTATE, DEXTROAMPHETAMINE SULFATE AND AMPHETAMINE SULFATE 5; 5; 5; 5 MG/1; MG/1; MG/1; MG/1
TABLET ORAL
Qty: 90 TABLET | Refills: 0 | Status: SHIPPED | OUTPATIENT
Start: 2019-04-04 | End: 2019-06-11

## 2019-04-04 RX ORDER — LAMOTRIGINE 150 MG/1
150 TABLET ORAL DAILY
Qty: 90 TABLET | Refills: 1 | Status: SHIPPED | OUTPATIENT
Start: 2019-04-04 | End: 2019-10-07

## 2019-04-04 NOTE — NURSING NOTE
Chief Complaint   Patient presents with     Recheck Medication     Major depressive disorder, recurrent episode, in full remission

## 2019-04-04 NOTE — PROGRESS NOTES
"  Psychiatry Clinic Progress Note                                                                   Lashonda Rodriguez is a 39 year old female who prefers the name Josi and pronouns she, her, hers, herself.  Therapist: None  PCP: Selma Alba  Other Providers: None     PREVIOUS PSYCH MED TRIALS:  - Provigil (limited effect, 2-3 month trial)  - Nuvigil 250mg (trialed in 2013-ineffective)  - buspar (unknown)  - Wellbutrin XL (taken with Adderall, two seizures in 2009/2010)  - AMT 10mg for skin picking (unknown)  - NAC (ineffective, unsure about her compliance)     Pertinent Background:  See previous notes.  Psych critical item history includes [no critical items].      Interim History                                                                                                        4, 4      The patient is a fair historian, reports good treatment adherence and was last seen 10/04/2018 when she chose to continue lamotrigine 150mg daily with Adderall 40mg QAM, 20mg QNoon.    Since the last visit, she's been pretty good.  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    Recent Symptoms:   Depression: denies  Anxiety:  insignificant  - skin picking is \"fine\"     ADVERSE EFFECTS: none  MEDICAL CONCERNS: none today     APPETITE: OK, weight stable  - eats vegan (focuses on protein shakes, PB, leafy greens with CSA)     SLEEP: stays awake later at night doing things she enjoys, sleeping an average of 6-7 hours nightly     Recent Substance Use:  Alcohol- limits, prefers craft beer  Caffeine- cafffeine pills [takes No Doze with Adderall for narcolepsy] "      Per 9/14/2016 note: history of alcohol, cannabis, meth abuse, treatment about 14yo in 1997 for cannabis        Social/ Family History                                  [per patient report]                                 1ea,1ea      FINANCIAL SUPPORT- working and family or friend       CHILDREN- two daughters (13yo, 15yo)       LIVING SITUATION- lives with  of 3 years (partner for 13 years) and two daughters      LEGAL- None  EARLY HISTORY/ EDUCATION- Grew up in [place] with half siblings. BS in Biology from [employer]  SOCIAL/ SPIRITUAL SUPPORT- support from family, friends       CULTURAL INFLUENCES/ IMPACT- UNKNOWN       TRAUMA HISTORY (self-report)- None  FEELS SAFE AT HOME- Yes  FAMILY HISTORY-  UNKNOWN    Medical / Surgical History                                                                                                                  Patient Active Problem List   Diagnosis     Major depressive disorder, recurrent episode (H)     Narcolepsy     Bladder spasms     Exercise-induced asthma     CARDIOVASCULAR SCREENING; LDL GOAL LESS THAN 160     Impulse control disorder     Attention deficit disorder     Hx of psychiatric care     Amanda       Past Surgical History:   Procedure Laterality Date     ECTOPIC PREGNANCY SURGERY       HC TOOTH EXTRACTION W/FORCEP        Medical Review of Systems                                                                                                    2,10     Pregnant- No    Breastfeeding- No    Contraception- IUD  A comprehensive review of systems was performed and is negative other than noted in the HPI.  Denies head trauma, LOC.      While taking Wellbutrin, she reports history of general tonic clonic seizure 9/20/2009 without aura with postictal confusion; second seizure while with SO on 1/23/10 (tonic clonic; foaming at the mouth with post ictal confusion and lethargy. Narcolepsy diagnosed about 2004. Remote history of febrile convulsion before [age] yo,  her daughter has a history of febrile convulsions.     Allergy                                  Wellbutrin [bupropion] and Penicillins     Wellbutrin- two seizures while taking Wellbutrin and Adderall    Current Medications                                                                                                       Current Outpatient Medications   Medication Sig Dispense Refill     acetaminophen (TYLENOL) 500 MG tablet take 1-2 Tabs by mouth every 6 hours as needed.       amphetamine-dextroamphetamine (ADDERALL) 20 MG tablet Take 2 tabs in the morning, 1 tab at noon. 90 tablet 0     amphetamine-dextroamphetamine (ADDERALL) 20 MG tablet Take 2 tabs in the morning, 1 tab at noon. 90 tablet 0     caffeine (NO-DOZE) 200 MG TABS Take 1 tablet (200 mg) by mouth 2 times daily (Patient taking differently: Take 200 mg by mouth 2 times daily 400 mg qam and 200 qnoon) 150 tablet      cetirizine (ZYRTEC ALLERGY) 10 MG tablet Take 10 mg by mouth daily       Cyanocobalamin (VITAMIN B 12 PO)        fluocin-hydroquinone-tretinoin 0.01-4-0.05 % CREA Twice daily To the face 30 g 2     hydroquinone 4 % CREA Externally apply topically daily In the morning (Patient not taking: Reported on 2/22/2018) 30 g 2     Ibuprofen 200 MG capsule take 200-400 mg by mouth every 6 hours as needed.       lamoTRIgine (LAMICTAL) 150 MG tablet Take 1 tablet (150 mg) by mouth daily 90 tablet 1     melatonin 5 MG tablet Take 10 mg by mouth nightly as needed        Vitals                                                                                                                       3, 3     HR: 80  BP: 115/72    Weight: 167.6#    Mental Status Exam                                                                                    9, 14 cog gs     Alertness: alert  and oriented  Appearance: well groomed  Behavior/Demeanor: cooperative, pleasant and calm, with good  eye contact   Speech: normal  Language: no problems  Psychomotor: normal or  unremarkable  Mood: description consistent with euthymia  Affect: full range and appropriate; was congruent to mood; was congruent to content  Thought Process/Associations: unremarkable  Thought Content:  Reports none;  Denies suicidal ideation, violent ideation, delusions, preoccupations, obsessions , phobia , magical thinking, over-valued ideas and paranoid ideation  Perception:  Reports none;  Denies auditory hallucinations, visual hallucinations, visual distortion seen as shadows , depersonalization and derealization  Insight: fair  Judgment: good  Cognition: (6) does  appear grossly intact; formal cognitive testing was not done  Gait/Station and/or Muscle Strength/Tone: unremarkable    Labs and Data                                                                                                                 Rating Scales:    PHQ9    PHQ9 Today:  0  PHQ-9 SCORE 1/11/2018 4/3/2018 10/4/2018   PHQ-9 Total Score - - -   PHQ-9 Total Score 0 0 1     Diagnosis and Assessment                                                                             m2, h3     DIAGNOSIS:   primary narcolepsy 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 (diagnosed by sleep study in 2007)  recurrent MDD in remission  - h/o impulse control disorder  - skin picking since 20yo     Today the following issues were addressed:     1) meds, doses  2) family therapy at [facility]  3) stimulant refills     [: 4/2019 Adderall 20mg #90 last filled by writer 3/20/19     PSYCHOTROPIC DRUG INTERACTIONS: LAMOTRIGINE, TYLENOL (may result in decreased lamotrigine effectiveness)    Drug Interaction Management: Monitoring for adverse effects, routine vitals, using lowest therapeutic dose of [psychotropics] and patient is aware of risks     Plan                                                                                                                    m2, h3       1) she chooses to continue lamotrigine 150mg daily, Adderall 40mg QAM, 20mg Qnoon; two  months RFs provided today  - takes melatonin 10mg at bedtime PRN  - takes No Doze 200mg BID for narcolepsy     2) active in family therapy at [facility]      3) she will contact office for  and refill of Adderall     RTC: 6 months, sooner as needed    CRISIS NUMBERS:   Provided routinely in AVS.    Treatment Risk Statement:  The patient understands the risks, benefits, adverse effects and alternatives. Agrees to treatment with the capacity to do so. No medical contraindications to treatment. Agrees to call clinic for any problems. The patient understands to call 911 or go to the nearest ED if life threatening or urgent symptoms occur.     PROVIDER:  AYSE Dumont CNP

## 2019-04-05 ASSESSMENT — PATIENT HEALTH QUESTIONNAIRE - PHQ9: SUM OF ALL RESPONSES TO PHQ QUESTIONS 1-9: 0

## 2019-06-06 ENCOUNTER — OFFICE VISIT (OUTPATIENT)
Dept: DERMATOLOGY | Facility: CLINIC | Age: 39
End: 2019-06-06
Payer: COMMERCIAL

## 2019-06-06 DIAGNOSIS — L81.1 MELASMA: Primary | ICD-10-CM

## 2019-06-06 ASSESSMENT — PAIN SCALES - GENERAL: PAINLEVEL: NO PAIN (0)

## 2019-06-06 NOTE — PROGRESS NOTES
"[facility] Dermatology Note      Dermatology Problem List:  1. Melasma - improving on Tri-Celia, started 2/22/2018    Encounter Date: Jun 6, 2019     CC:   Chief Complaint   Patient presents with     Derm Problem     Melasma follow up, Josi robles \" I was out in the sun so it is more noticeable.\"      History of Present Illness:  Ms. Lashonda Rodriguez is a 39 year old female who presents as a follow-up for melasma. The patient was last seen 4/26/18 for follow up. She reports that she has been using using the Tri-Celia cream twice daily for the past 2 weeks. She has used it intermittently for the past 3 months. She did not use it regularly over the winter months and was off the cream for one year before March of this year when she restarted the Tri-Celia. Feels like the Tri-Celia cream has been working well. She has a mirena IUD in place. No other skin concerns or family history of skin cancer. She has been in the sun more often and has noticed that her melasma on her face, particularly on hairline worsens after she is in the sun. She feels like the cream is working well and would like to continue the current regimen.     Past Medical History:   Patient Active Problem List   Diagnosis     Major depressive disorder, recurrent episode (H)     Narcolepsy     Bladder spasms     Exercise-induced asthma     CARDIOVASCULAR SCREENING; LDL GOAL LESS THAN 160     Impulse control disorder     Attention deficit disorder     Hx of psychiatric care     Melasma     Past Medical History:   Diagnosis Date     Anxiety      Bladder spasms      Depression 01/05/2009     Exercise-induced asthma 06/11/2009     Narcolepsy 12/28/2009     Seizure (H) 2 approx [id number]    Secondary to Wellbutrin and stimulant use     Past Surgical History:   Procedure Laterality Date     ECTOPIC PREGNANCY SURGERY       HC TOOTH EXTRACTION W/FORCEP         Social History:  Patient reports that she quit smoking about 11 years ago. Her smoking " use included cigarettes. She has never used smokeless tobacco. She reports that she drinks alcohol. She reports that she does not use drugs.    Family History:  Family History   Problem Relation Age of Onset     Melanoma No family hx of      Skin Cancer No family hx of        Medications:  Current Outpatient Medications   Medication Sig Dispense Refill     acetaminophen (TYLENOL) 500 MG tablet take 1-2 Tabs by mouth every 6 hours as needed.       amphetamine-dextroamphetamine (ADDERALL) 20 MG tablet Take 2 tabs in the morning, 1 tab at noon. 90 tablet 0     amphetamine-dextroamphetamine (ADDERALL) 20 MG tablet Take 2 tabs in the morning, 1 tab at noon. 90 tablet 0     caffeine (NO-DOZE) 200 MG TABS Take 1 tablet (200 mg) by mouth 2 times daily (Patient taking differently: Take 200 mg by mouth 2 times daily 400 mg qam and 200 qnoon) 150 tablet      cetirizine (ZYRTEC ALLERGY) 10 MG tablet Take 10 mg by mouth daily       Cyanocobalamin (VITAMIN B 12 PO)        fluocin-hydroquinone-tretinoin 0.01-4-0.05 % CREA Twice daily To the face 30 g 2     hydroquinone 4 % CREA Externally apply topically daily In the morning (Patient not taking: Reported on 2/22/2018) 30 g 2     Ibuprofen 200 MG capsule take 200-400 mg by mouth every 6 hours as needed.       lamoTRIgine (LAMICTAL) 150 MG tablet Take 1 tablet (150 mg) by mouth daily 90 tablet 1     melatonin 5 MG tablet Take 10 mg by mouth nightly as needed           Allergies   Allergen Reactions     Wellbutrin [Bupropion] Other (See Comments)     Two seizures while on Adderall and Wellbutrin     Penicillins Rash         Review of Systems:  -Skin Establ Pt: The patient denies any new rash, pruritus, or lesions that are symptomatic, changing or bleeding, except as per HPI.  -Constitutional: Otherwise feeling well today, in usual state of health.  -HEENT: Patient denies nonhealing oral sores.  -Skin: As above in HPI. No additional skin concerns.     Physical exam:  Vitals: There  were no vitals taken for this visit.   GEN: This is a well developed, well-nourished female in no acute distress, in a pleasant mood.    SKIN: Focused examination of the face was performed.  - Minimal macular hyperpigmentation is present on the lateral aspects of her forehead and right nasal bridge, barely visible on exam today   - No other lesions of concern on areas examined.     Impression/Plan:  1. Melasma - She is doing well on the Tri-Celia twice daily and recently restarted it after being off of it for a year. She understands the risk of paradoxical hyperpigmentation if used for more than 3 months of time.     Continue Tri-Celia 1-2 times daily through the summer for at most 3 months     Recommended taking breaks such as stopping over the fall and winter to minimize risks of paradoxical hyperpigmentation     Reviewed importance of strict photoprotection with sun protective clothing and physical sunscreen with zinc and titanium     Discussed doing a skin check at next visit or the following year at age 41      CC Referred Self, MD  No address on file on close of this encounter.  Follow-up in 1 year, earlier for new or changing lesions.     Dr. Castellanos staffed the patient.    Staff Involved:  Resident(Rosita)/Staff    I have seen and examined this patient and agree with the assessment and plan as documented in the resident's note.    Mike Castellanos MD  Dermatology Attending

## 2019-06-06 NOTE — LETTER
"6/6/2019       RE: Lashonda Rodriguez  [address]  [place] [address]     Dear Colleague,    Thank you for referring your patient, Lashonda Rodriguez, to the [facility] DERMATOLOGY at [facility]. Please see a copy of my visit note below.    [facility] Dermatology Note      Dermatology Problem List:  1. Melasma - improving on Tri-Celia, started 2/22/2018    Encounter Date: Jun 6, 2019     CC:   Chief Complaint   Patient presents with     Derm Problem     Melasma follow up, Josi robles \" I was out in the sun so it is more noticeable.\"      History of Present Illness:  Ms. Lashonda Rodriguez is a 39 year old female who presents as a follow-up for melasma. The patient was last seen 4/26/18 for follow up. She reports that she has been using using the Tri-Celia cream twice daily for the past 2 weeks. She has used it intermittently for the past 3 months. She did not use it regularly over the winter months and was off the cream for one year before March of this year when she restarted the Tri-Celia. Feels like the Tri-Celia cream has been working well. She has a mirena IUD in place. No other skin concerns or family history of skin cancer. She has been in the sun more often and has noticed that her melasma on her face, particularly on hairline worsens after she is in the sun. She feels like the cream is working well and would like to continue the current regimen.     Past Medical History:   Patient Active Problem List   Diagnosis     Major depressive disorder, recurrent episode (H)     Narcolepsy     Bladder spasms     Exercise-induced asthma     CARDIOVASCULAR SCREENING; LDL GOAL LESS THAN 160     Impulse control disorder     Attention deficit disorder     Hx of psychiatric care     Melasma     Past Medical History:   Diagnosis Date     Anxiety      Bladder spasms      Depression 01/05/2009     Exercise-induced asthma 06/11/2009     Narcolepsy 12/28/2009 "     Seizure (H) 2 approx [id number]    Secondary to Wellbutrin and stimulant use     Past Surgical History:   Procedure Laterality Date     ECTOPIC PREGNANCY SURGERY       HC TOOTH EXTRACTION W/FORCEP         Social History:  Patient reports that she quit smoking about 11 years ago. Her smoking use included cigarettes. She has never used smokeless tobacco. She reports that she drinks alcohol. She reports that she does not use drugs.    Family History:  Family History   Problem Relation Age of Onset     Melanoma No family hx of      Skin Cancer No family hx of        Medications:  Current Outpatient Medications   Medication Sig Dispense Refill     acetaminophen (TYLENOL) 500 MG tablet take 1-2 Tabs by mouth every 6 hours as needed.       amphetamine-dextroamphetamine (ADDERALL) 20 MG tablet Take 2 tabs in the morning, 1 tab at noon. 90 tablet 0     amphetamine-dextroamphetamine (ADDERALL) 20 MG tablet Take 2 tabs in the morning, 1 tab at noon. 90 tablet 0     caffeine (NO-DOZE) 200 MG TABS Take 1 tablet (200 mg) by mouth 2 times daily (Patient taking differently: Take 200 mg by mouth 2 times daily 400 mg qam and 200 qnoon) 150 tablet      cetirizine (ZYRTEC ALLERGY) 10 MG tablet Take 10 mg by mouth daily       Cyanocobalamin (VITAMIN B 12 PO)        fluocin-hydroquinone-tretinoin 0.01-4-0.05 % CREA Twice daily To the face 30 g 2     hydroquinone 4 % CREA Externally apply topically daily In the morning (Patient not taking: Reported on 2/22/2018) 30 g 2     Ibuprofen 200 MG capsule take 200-400 mg by mouth every 6 hours as needed.       lamoTRIgine (LAMICTAL) 150 MG tablet Take 1 tablet (150 mg) by mouth daily 90 tablet 1     melatonin 5 MG tablet Take 10 mg by mouth nightly as needed           Allergies   Allergen Reactions     Wellbutrin [Bupropion] Other (See Comments)     Two seizures while on Adderall and Wellbutrin     Penicillins Rash         Review of Systems:  -Skin Establ Pt: The patient denies any new rash,  pruritus, or lesions that are symptomatic, changing or bleeding, except as per HPI.  -Constitutional: Otherwise feeling well today, in usual state of health.  -HEENT: Patient denies nonhealing oral sores.  -Skin: As above in HPI. No additional skin concerns.     Physical exam:  Vitals: There were no vitals taken for this visit.   GEN: This is a well developed, well-nourished female in no acute distress, in a pleasant mood.    SKIN: Focused examination of the face was performed.  - Minimal macular hyperpigmentation is present on the lateral aspects of her forehead and right nasal bridge, barely visible on exam today   - No other lesions of concern on areas examined.     Impression/Plan:  1. Melasma -  She is doing well on the Tri-Celia  twice daily and recently restarted it after being off of it for a year. She understands the risk of paradoxical hyperpigmentation if used for more than 3 months of time.     Continue Tri-Celia 1-2 times daily through the summer for at most 3 months     Recommended taking breaks such as stopping over the fall and winter to minimize risks of paradoxical hyperpigmentation     Reviewed importance of strict photoprotection with sun protective clothing and physical sunscreen with zinc and titanium     Discussed doing a skin check at next visit or the following year at age 41      CC Referred Self, MD  No address on file on close of this encounter.  Follow-up in 1 year, earlier for new or changing lesions.     Dr. Castellanos staffed the patient.    Staff Involved:  Resident(Rosita)/Staff    I have seen and examined this patient and agree with the assessment and plan as documented in the resident's note.    Mike Castellanos MD  Dermatology Attending

## 2019-06-06 NOTE — NURSING NOTE
"Dermatology Rooming Note    Lashonda Rodriguez's goals for this visit include:   Chief Complaint   Patient presents with     Derm Problem     Melasma follow up, Josi robles \" I was out in the sun so it is more noticeable.\"      Nanette Cloud LPN  "

## 2019-06-11 DIAGNOSIS — G47.419 PRIMARY NARCOLEPSY WITHOUT CATAPLEXY: ICD-10-CM

## 2019-06-11 NOTE — TELEPHONE ENCOUNTER
Refill Request   Received: Today   Message Contents   Waleska Duncan Radhika, RN             Farzaneh told pt to let you know when she's down to two weeks on her Adderall so that you can help facilitate getting her the refill rx. Can be mailed to home address, [address] [facility] [place] [facility] [place] [address]      Last seen: 4/4/19  RTC: 6 months   Cancel: none   No-show: none   Next appt: 10/7/19    Incoming refill from patient via phone     Medication requested: amphetamine-dextroamphetamine (ADDERALL) 20 MG tablet  Directions: Take 2 tabs in the morning, 1 tab at noon  Qty: 90  Last refilled: 5/22/19, 4/23/19, 3/20/19    Will route to provider for approval on 2 month supply

## 2019-06-12 RX ORDER — DEXTROAMPHETAMINE SACCHARATE, AMPHETAMINE ASPARTATE, DEXTROAMPHETAMINE SULFATE AND AMPHETAMINE SULFATE 5; 5; 5; 5 MG/1; MG/1; MG/1; MG/1
TABLET ORAL
Qty: 90 TABLET | Refills: 0 | Status: SHIPPED | OUTPATIENT
Start: 2019-06-12 | End: 2019-08-19

## 2019-06-12 RX ORDER — DEXTROAMPHETAMINE SACCHARATE, AMPHETAMINE ASPARTATE, DEXTROAMPHETAMINE SULFATE AND AMPHETAMINE SULFATE 5; 5; 5; 5 MG/1; MG/1; MG/1; MG/1
TABLET ORAL
Qty: 90 TABLET | Refills: 0 | Status: SHIPPED | OUTPATIENT
Start: 2019-06-12 | End: 2019-10-07

## 2019-06-12 NOTE — TELEPHONE ENCOUNTER
Writer received two signed script from provider for Adderall. Script mailed out to patients home address at [address] [facility] [place] [address] per request.     No further action needed by this writer

## 2019-08-19 DIAGNOSIS — G47.419 PRIMARY NARCOLEPSY WITHOUT CATAPLEXY: ICD-10-CM

## 2019-08-19 RX ORDER — DEXTROAMPHETAMINE SACCHARATE, AMPHETAMINE ASPARTATE, DEXTROAMPHETAMINE SULFATE AND AMPHETAMINE SULFATE 5; 5; 5; 5 MG/1; MG/1; MG/1; MG/1
TABLET ORAL
Qty: 90 TABLET | Refills: 0 | Status: SHIPPED | OUTPATIENT
Start: 2019-08-19 | End: 2019-09-18

## 2019-08-19 NOTE — TELEPHONE ENCOUNTER
Adderall Refill   Received: Today   Message Contents   Waleska Duncan Radhika, RN   Phone Number: [phone]             Caller:  self   Relationship to pt: self   Medication:   adderall   How many days left of med do you have left (if >3 day supply, redirect to pharmacy): 3 or 4 days   Pharmacy and location: mail to pt - confirmed address in chart   Pending appt date:  10/7   Okay to leave detailed VM:  [phone]     Pt requests a call to let her know when you're mailing it. Said you told her to call you for the refill.      Last seen: 4/4/19  RTC: 3 months   Cancel: none   No-show: none   Next appt: 10/7/19    Incoming refill from patient via phone     Medication requested: amphetamine-dextroamphetamine (ADDERALL) 20 MG tablet  Directions: Take 2 tabs in the morning, 1 tab at noon  Qty: 90  Last refilled: 7/23/19, 6/20/19, 5/22/19    Will route to provider for approval due to outside of RTC timeframe

## 2019-08-19 NOTE — TELEPHONE ENCOUNTER
Writer received signed script from provider   Script mailed out to home address confirmed on epic per patients request   Patient notified     No further action needed by this writer

## 2019-09-18 DIAGNOSIS — G47.419 PRIMARY NARCOLEPSY WITHOUT CATAPLEXY: ICD-10-CM

## 2019-09-18 RX ORDER — DEXTROAMPHETAMINE SACCHARATE, AMPHETAMINE ASPARTATE, DEXTROAMPHETAMINE SULFATE AND AMPHETAMINE SULFATE 5; 5; 5; 5 MG/1; MG/1; MG/1; MG/1
TABLET ORAL
Qty: 90 TABLET | Refills: 0 | Status: SHIPPED | OUTPATIENT
Start: 2019-09-18 | End: 2019-10-07

## 2019-09-18 NOTE — TELEPHONE ENCOUNTER
[facility] Call Center    Phone Message    May a detailed message be left on voicemail: yes    Reason for Call: Medication Refill Request    Has the patient contacted the pharmacy for the refill? Yes   Name of medication being requested: Adderall  Provider who prescribed the medication: AYSE Calabrese CNP  Pharmacy: Mail to address in [facility] (please call pt once the prescription has been sent)  Date medication is needed: 9/21      Action Taken: Message routed to:  Other: [facility]

## 2019-09-18 NOTE — TELEPHONE ENCOUNTER
Last seen: 10/7/19  RTC: 6 months   Cancel: none   No-show: none   Next appt: 10/7/19    Incoming refill from patient via phone     Medication requested:amphetamine-dextroamphetamine (ADDERALL) 20 MG tablet   Directions: Take 2 tabs in the morning, 1 tab at noon  Qty: 90  Last refilled: 8/23/19, 7/23/19, 6/20/19     Will route to provider for approval

## 2019-10-07 ENCOUNTER — OFFICE VISIT (OUTPATIENT)
Dept: PSYCHIATRY | Facility: CLINIC | Age: 39
End: 2019-10-07
Attending: NURSE PRACTITIONER
Payer: COMMERCIAL

## 2019-10-07 VITALS
HEART RATE: 72 BPM | SYSTOLIC BLOOD PRESSURE: 115 MMHG | DIASTOLIC BLOOD PRESSURE: 72 MMHG | WEIGHT: 170 LBS | BODY MASS INDEX: 27.44 KG/M2

## 2019-10-07 DIAGNOSIS — G47.419 PRIMARY NARCOLEPSY WITHOUT CATAPLEXY: ICD-10-CM

## 2019-10-07 DIAGNOSIS — F33.42 MAJOR DEPRESSIVE DISORDER, RECURRENT EPISODE, IN FULL REMISSION (H): ICD-10-CM

## 2019-10-07 PROCEDURE — G0463 HOSPITAL OUTPT CLINIC VISIT: HCPCS | Mod: ZF

## 2019-10-07 RX ORDER — DEXTROAMPHETAMINE SACCHARATE, AMPHETAMINE ASPARTATE, DEXTROAMPHETAMINE SULFATE AND AMPHETAMINE SULFATE 5; 5; 5; 5 MG/1; MG/1; MG/1; MG/1
TABLET ORAL
Qty: 90 TABLET | Refills: 0 | Status: SHIPPED | OUTPATIENT
Start: 2019-10-07 | End: 2020-01-13

## 2019-10-07 RX ORDER — LAMOTRIGINE 150 MG/1
150 TABLET ORAL DAILY
Qty: 90 TABLET | Refills: 1 | Status: SHIPPED | OUTPATIENT
Start: 2019-10-07 | End: 2020-04-06

## 2019-10-07 RX ORDER — DEXTROAMPHETAMINE SACCHARATE, AMPHETAMINE ASPARTATE, DEXTROAMPHETAMINE SULFATE AND AMPHETAMINE SULFATE 5; 5; 5; 5 MG/1; MG/1; MG/1; MG/1
TABLET ORAL
Qty: 90 TABLET | Refills: 0 | Status: SHIPPED | OUTPATIENT
Start: 2019-10-07 | End: 2020-02-17

## 2019-10-07 RX ORDER — DEXTROAMPHETAMINE SACCHARATE, AMPHETAMINE ASPARTATE, DEXTROAMPHETAMINE SULFATE AND AMPHETAMINE SULFATE 5; 5; 5; 5 MG/1; MG/1; MG/1; MG/1
TABLET ORAL
Qty: 90 TABLET | Refills: 0 | Status: SHIPPED | OUTPATIENT
Start: 2019-10-07 | End: 2019-10-07

## 2019-10-07 ASSESSMENT — PAIN SCALES - GENERAL: PAINLEVEL: NO PAIN (0)

## 2019-10-07 NOTE — PROGRESS NOTES
Psychiatry Clinic Progress Note                                                                   Lashonda Rodriguez is a 39 year old female who prefers the name Josi and pronouns she, her, hers, herself.  Therapist: None  PCP: Selma Alba  Other Providers: None     PREVIOUS PSYCH MED TRIALS:  - Provigil (limited effect, 2-3 month trial)  - Nuvigil 250mg (trialed in 2013-ineffective)  - buspar (unknown)  - Wellbutrin XL (taken with Adderall, two seizures in 2009/2010)  - AMT 10mg for skin picking (unknown)  - NAC (ineffective, unsure about her compliance)     Pertinent Background:  See previous notes.  Psych critical item history includes [no critical items].      Interim History                                                                                                        4, 4      The patient is a fair historian, reports good treatment adherence and was last seen 4/04/2019 when she chose to continue lamotrigine 150mg daily with Adderall 40mg QAM, 20mg QNoon.     She consents for [facility] student Johnson Corrigan to be present.    Since the last visit, she's been really good.  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; she'll was approved to pursue a business improvement certificate at [employer]  - with her job knowledge, she works cross [employer]   - pleased she started exercise everyday at [employer]  - her oldest daughter Leticia started [employer]  - her youngest daughter turned 14yo and asked someone to Homecoming  - she rented an AirBnB in [place] for a week next year  - active in family counseling  - 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, of her three bands, 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, 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     Recent  Symptoms:   Depression: denies  Anxiety:  insignificant  - reduced skin picking continues, picking increases with stress     ADVERSE EFFECTS: none  MEDICAL CONCERNS: none today     APPETITE: OK, weight stable within 3#  - eats vegan (focuses on protein shakes, PB, leafy greens with CSA)      SLEEP: sleep improved with exercise regimen; with melatonin 10mg, sleeping 6-7 hours nightly     Recent Substance Use:  Alcohol- limits, prefers craft beer  Caffeine- takes No Doze with Adderall for narcolepsy     Per 9/14/2016 note: history of alcohol, cannabis, meth abuse, treatment about 16yo in 1997 for cannabis        Social/ Family History                                  [per patient report]                                 1ea,1ea      FINANCIAL SUPPORT- working  CHILDREN- two daughters (15yo, 17yo)       LIVING SITUATION- lives with  of 3 years (partner for 13 years) and two daughters      LEGAL- None  EARLY HISTORY/ EDUCATION- Grew up in [place] with half siblings. BS in Biology from [employer]  SOCIAL/ SPIRITUAL SUPPORT- support from family, friends       CULTURAL INFLUENCES/ IMPACT- UNKNOWN       TRAUMA HISTORY- None  FEELS SAFE AT HOME- Yes  FAMILY HISTORY-  UNKNOWN    Medical / Surgical History                                                                                                                  Patient Active Problem List   Diagnosis     Major depressive disorder, recurrent episode (H)     Narcolepsy     Bladder spasms     Exercise-induced asthma     CARDIOVASCULAR SCREENING; LDL GOAL LESS THAN 160     Impulse control disorder     Attention deficit disorder     Hx of psychiatric care     Melasma       Past Surgical History:   Procedure Laterality Date     ECTOPIC PREGNANCY SURGERY       HC TOOTH EXTRACTION W/FORCEP        Medical Review of Systems                                                                                                    2,10     Pregnant- No    Breastfeeding-  No    Contraception- IUD  A comprehensive review of systems was performed and is negative other than noted in the HPI.  Denies head trauma, LOC.      While taking Wellbutrin, she reports history of general tonic clonic seizure 9/20/2009 without aura with postictal confusion; second seizure while with SO on 1/23/10 (tonic clonic; foaming at the mouth with post ictal confusion and lethargy. Narcolepsy diagnosed about 2004. Remote history of febrile convulsion before [age] yo, her daughter has a history of febrile convulsions.     Allergy                                  Wellbutrin [bupropion] and Penicillins     Wellbutrin- two seizures while taking Wellbutrin and Adderall    Current Medications                                                                                                       Current Outpatient Medications   Medication Sig Dispense Refill     acetaminophen (TYLENOL) 500 MG tablet take 1-2 Tabs by mouth every 6 hours as needed.       amphetamine-dextroamphetamine (ADDERALL) 20 MG tablet Take 2 tabs in the morning, 1 tab at noon. 90 tablet 0     amphetamine-dextroamphetamine (ADDERALL) 20 MG tablet Take 2 tabs in the morning, 1 tab at noon. 90 tablet 0     caffeine (NO-DOZE) 200 MG TABS Take 1 tablet (200 mg) by mouth 2 times daily (Patient taking differently: Take 200 mg by mouth 2 times daily 400 mg qam and 200 qnoon) 150 tablet      cetirizine (ZYRTEC ALLERGY) 10 MG tablet Take 10 mg by mouth daily       Cyanocobalamin (VITAMIN B 12 PO)        fluocin-hydroquinone-tretinoin 0.01-4-0.05 % CREA Twice daily To the face 30 g 2     hydroquinone 4 % CREA Externally apply topically daily In the morning 30 g 2     Ibuprofen 200 MG capsule take 200-400 mg by mouth every 6 hours as needed.       lamoTRIgine (LAMICTAL) 150 MG tablet Take 1 tablet (150 mg) by mouth daily 90 tablet 1     melatonin 5 MG tablet Take 10 mg by mouth nightly as needed        Vitals                                                                                                                        3, 3     /72   Pulse 72   Wt 77.1 kg (170 lb)   BMI 27.44 kg/m       Mental Status Exam                                                                                    9, 14 cog gs     Alertness: alert  and oriented  Appearance: adequately groomed  Behavior/Demeanor: cooperative, pleasant and calm, with good  eye contact   Speech: normal  Language: no problems  Psychomotor: normal or unremarkable  Mood: description consistent with euthymia  Affect: full range and appropriate; was congruent to mood; was congruent to content  Thought Process/Associations: unremarkable  Thought Content:  Reports none;  Denies suicidal ideation, violent ideation, delusions, preoccupations, obsessions , phobia , magical thinking, over-valued ideas and paranoid ideation  Perception:  Reports none;  Denies auditory hallucinations, visual hallucinations, visual distortion seen as shadows , depersonalization and derealization  Insight: fair  Judgment: good  Cognition: (6) does  appear grossly intact; formal cognitive testing was not done  Gait/Station and/or Muscle Strength/Tone: unremarkable    Labs and Data                                                                                                                 Rating Scales:    PHQ9    PHQ9 Today:  0  PHQ-9 SCORE 4/3/2018 10/4/2018 4/4/2019   PHQ-9 Total Score - - -   PHQ-9 Total Score 0 1 0     Diagnosis and Assessment                                                                             m2, h3     DIAGNOSIS:   primary narcolepsy 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 (diagnosed by sleep study in 2007)  recurrent MDD in remission  - h/o impulse control disorder  - skin picking since 20yo     Today the following issues were addressed:     1) meds, doses  2) family therapy at [facility]  3) stimulant refills     : 10/2019- Adderall 20mg #90 last filled by writer on 09/19/19     PSYCHOTROPIC DRUG  INTERACTIONS:  - LAMOTRIGINE, TYLENOL (may result in decreased lamotrigine effectiveness)     Drug Interaction Management: Monitoring for adverse effects, routine vitals, using lowest therapeutic dose of [psychotropics] and patient is aware of risks     Plan                                                                                                                    m2, h3       1) she chooses to continue lamotrigine 150mg daily, Adderall 40mg QAM, 20mg Qnoon (three months sent today)    - takes melatonin 10mg at bedtime PRN  - takes No Doze 200mg BID for narcolepsy     2) active in family therapy at [facility]      3) she will contact office for  and refill of Adderall in 3 months     RTC: 6 months, sooner as needed    CRISIS NUMBERS:   Provided routinely in AVS.  [facility] [phone] (clinic)    [phone] (after hours)  [facility] [place] [facility] for Adults [[phone]];  Child [[phone]]      Treatment Risk Statement:  The patient understands the risks, benefits, adverse effects and alternatives. Agrees to treatment with the capacity to do so. No medical contraindications to treatment. Agrees to call clinic for any problems. The patient understands to call 911 or go to the nearest ED if life threatening or urgent symptoms occur.     PROVIDER:  AYSE Dumont CNP

## 2019-10-08 ASSESSMENT — PATIENT HEALTH QUESTIONNAIRE - PHQ9: SUM OF ALL RESPONSES TO PHQ QUESTIONS 1-9: 0

## 2020-01-09 ENCOUNTER — TRANSCRIBE ORDERS (OUTPATIENT)
Dept: PSYCHIATRY | Facility: CLINIC | Age: 40
End: 2020-01-09

## 2020-01-09 DIAGNOSIS — G47.419 PRIMARY NARCOLEPSY WITHOUT CATAPLEXY: ICD-10-CM

## 2020-01-09 NOTE — TELEPHONE ENCOUNTER
Last seen: 10/7/19  RTC: 6 months   Cancel: none   No-show: none   Next appt: 4/6/20    Incoming refill from patient via phone     Medication requested:amphetamine-dextroamphetamine (ADDERALL) 20 MG tablet   Directions: Take 2 tabs in the morning, 1 tab at noon  Qty: 90  Last refilled: 12/21/19 #90, 11/8/19 #90, 10/18/19 #90    Will route to provider for approval     Placed a call to patient at [phone] to confirm the location of pharmacy needing this refill sent to. No answer at number provided. Unable to LVM due to mailbox full.

## 2020-01-09 NOTE — TELEPHONE ENCOUNTER
[facility] Call Center    Phone Message    May a detailed message be left on voicemail: yes    Reason for Call: Medication Refill Request    Has the patient contacted the pharmacy for the refill? Yes    adderall refill requested from Farzaneh Hicks. Pt reports problems with pharmacy, would like to switch to a new pharmacy.    New pharmacy requested - [facility] - [place] [address]       Pt needs rx by around the 20th      Action Taken: Other: nursing pool

## 2020-01-10 NOTE — TELEPHONE ENCOUNTER
Writer placed additional call to Josi to confirm pharmacy for refill request.  Josi did not answer and  was unable to leave  due to mailbox being full.   sent Josi a [email or web address] message requesting she touch base with us to confirm information.

## 2020-01-13 RX ORDER — DEXTROAMPHETAMINE SACCHARATE, AMPHETAMINE ASPARTATE, DEXTROAMPHETAMINE SULFATE AND AMPHETAMINE SULFATE 5; 5; 5; 5 MG/1; MG/1; MG/1; MG/1
TABLET ORAL
Qty: 90 TABLET | Refills: 0 | Status: SHIPPED | OUTPATIENT
Start: 2020-01-18 | End: 2020-02-17

## 2020-01-13 NOTE — TELEPHONE ENCOUNTER
Received [facility] message verifying pharmacy information.  Patient would like refill sent to [facility] at [address], [place] [address].  Writer routed pended order to provider.

## 2020-01-20 ENCOUNTER — TELEPHONE (OUTPATIENT)
Dept: PSYCHIATRY | Facility: CLINIC | Age: 40
End: 2020-01-20

## 2020-01-20 NOTE — TELEPHONE ENCOUNTER
Prior Authorization Retail Medication Request  URGENT REVIEW     Medication/Dose: amphetamine-dextroamphetamine (ADDERALL) 20 MG tablet- Take 2 tabs in the morning, 1 tab at noon  ICD code (if different than what is on RX):  Primary narcolepsy 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 [G47.419]   Previously Tried and Failed:   - Provigil (limited effect, 2-3 month trial)  - Nuvigil 250mg (trialed in 2013-ineffective)  - buspar (unknown)  - Wellbutrin XL (taken with Adderall, two seizures in 2009/2010)  - AMT 10mg for skin picking (unknown)  - NAC (ineffective, unsure about her compliance)    Rationale:  Patient has a long standing history of Narcolepsy 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 diagnosed by a sleep study in 2007. She is prescribed Adderall and caffeine tabs to treat narcolepsy since the diagnosis. Lashonda reports stable mood without skin picking behaviors. she denies side effects of medication from the medications. The use of Adderall (amphetamine salts) is indicated and appropriate. This regimen is both beneficial and well tolerated with no adverse effects or tolerance. There is no evidence of abuse of this medication or other substances. Please If Lashonda is unable to continue at her current dose of Adderall, she will experience worsening of narcolepsy symptoms as she has in the past. The result of which can negatively impact her social and family relationships, job performance, self-esteem and ability to safety operate a motor vehicle. We are requesting an expedited review in matter to avoid any futher interruption in Lashonda being able to refill this medication. Please feel free to contact out clinic with any questions.       Insurance Name:  Not provided   Insurance ID:  Not provided       Pharmacy Information (if different than what is on RX)  [facility]   Phone:  [facility]

## 2020-01-20 NOTE — TELEPHONE ENCOUNTER
[facility] Prior Authorization Team   Phone: [phone]    PA Initiation    Medication: amphetamine-dextroamphetamine (ADDERALL) 20 MG tablet- Take 2 tabs in the morning, 1 tab at noon  Insurance Company: [employer] - Phone [phone] Fax [phone]  Pharmacy Filling the Rx: [id number] [place], [place] - [address]  Filling Pharmacy Phone: [phone]  Filling Pharmacy Fax:    Start Date: 1/20/2020

## 2020-01-21 NOTE — TELEPHONE ENCOUNTER
Prior Authorization Approval    Authorization Effective Date: 1/20/2020  Authorization Expiration Date: 1/20/2021  Medication: amphetamine-dextroamphetamine (ADDERALL) 20 MG tablet  Approved Dose/Quantity:   Reference #:     Insurance Company: [employer] - Phone [phone] Fax [phone]  Expected CoPay:       CoPay Card Available:      Foundation Assistance Needed:    Which Pharmacy is filling the prescription (Not needed for infusion/clinic administered): [facility] [id number] [place] [facility] - [place], [place] - [address]  Pharmacy Notified: Yes  - via voicemail  Patient Notified:

## 2020-02-17 DIAGNOSIS — G47.419 PRIMARY NARCOLEPSY WITHOUT CATAPLEXY: ICD-10-CM

## 2020-02-17 RX ORDER — DEXTROAMPHETAMINE SACCHARATE, AMPHETAMINE ASPARTATE, DEXTROAMPHETAMINE SULFATE AND AMPHETAMINE SULFATE 5; 5; 5; 5 MG/1; MG/1; MG/1; MG/1
TABLET ORAL
Qty: 90 TABLET | Refills: 0 | Status: SHIPPED | OUTPATIENT
Start: 2020-03-16 | End: 2020-04-06

## 2020-02-17 RX ORDER — DEXTROAMPHETAMINE SACCHARATE, AMPHETAMINE ASPARTATE, DEXTROAMPHETAMINE SULFATE AND AMPHETAMINE SULFATE 5; 5; 5; 5 MG/1; MG/1; MG/1; MG/1
TABLET ORAL
Qty: 90 TABLET | Refills: 0 | Status: SHIPPED | OUTPATIENT
Start: 2020-02-17 | End: 2020-04-06

## 2020-02-17 NOTE — TELEPHONE ENCOUNTER
Last seen: 10/7/19  RTC: 6 months   Cancel: none   No-show: none   Next appt: 4/6/20    Incoming refill from patient via phone     Medication requested: amphetamine-dextroamphetamine (ADDERALL) 20 MG tablet  Directions: Take 2 tabs in the morning, 1 tab at noon  Qty: 90  Last refilled: 1/21 #90, 12/21 #90, 11/18 #90     Patient is requesting a two month supply to get her thought till the appt on 4/6/20. Writer agreed to reach out to provider for approval.

## 2020-02-24 ENCOUNTER — HEALTH MAINTENANCE LETTER (OUTPATIENT)
Age: 40
End: 2020-02-24

## 2020-04-06 ENCOUNTER — VIRTUAL VISIT (OUTPATIENT)
Dept: PSYCHIATRY | Facility: CLINIC | Age: 40
End: 2020-04-06
Attending: NURSE PRACTITIONER
Payer: COMMERCIAL

## 2020-04-06 DIAGNOSIS — F33.42 MAJOR DEPRESSIVE DISORDER, RECURRENT EPISODE, IN FULL REMISSION (H): ICD-10-CM

## 2020-04-06 DIAGNOSIS — G47.419 PRIMARY NARCOLEPSY WITHOUT CATAPLEXY: ICD-10-CM

## 2020-04-06 RX ORDER — DEXTROAMPHETAMINE SACCHARATE, AMPHETAMINE ASPARTATE, DEXTROAMPHETAMINE SULFATE AND AMPHETAMINE SULFATE 5; 5; 5; 5 MG/1; MG/1; MG/1; MG/1
TABLET ORAL
Qty: 90 TABLET | Refills: 0 | Status: SHIPPED | OUTPATIENT
Start: 2020-04-06 | End: 2020-06-15

## 2020-04-06 RX ORDER — DEXTROAMPHETAMINE SACCHARATE, AMPHETAMINE ASPARTATE, DEXTROAMPHETAMINE SULFATE AND AMPHETAMINE SULFATE 5; 5; 5; 5 MG/1; MG/1; MG/1; MG/1
TABLET ORAL
Qty: 90 TABLET | Refills: 0 | Status: SHIPPED | OUTPATIENT
Start: 2020-04-06 | End: 2020-08-19

## 2020-04-06 RX ORDER — DEXTROAMPHETAMINE SACCHARATE, AMPHETAMINE ASPARTATE, DEXTROAMPHETAMINE SULFATE AND AMPHETAMINE SULFATE 5; 5; 5; 5 MG/1; MG/1; MG/1; MG/1
TABLET ORAL
Qty: 90 TABLET | Refills: 0 | Status: SHIPPED | OUTPATIENT
Start: 2020-04-06 | End: 2020-07-20

## 2020-04-06 RX ORDER — LAMOTRIGINE 150 MG/1
150 TABLET ORAL DAILY
Qty: 90 TABLET | Refills: 1 | Status: SHIPPED | OUTPATIENT
Start: 2020-04-06 | End: 2020-11-11

## 2020-04-06 NOTE — PROGRESS NOTES
"       [facility]  Psychiatry Clinic  PSYCHIATRIC PROGRESS NOTE  telemedicine       Lashonda Rodriguez is a 40 year old female who prefers the name Josi and pronouns she, her, hers, herself.  Therapist: family counseling, considering couples counseling  PCP: Selma Alba  Other Providers: None     PREVIOUS PSYCH MED TRIALS:  - Provigil (limited effect, 2-3 month trial)  - Nuvigil 250mg (trialed in 2013-ineffective)  - buspar (unknown)  - Wellbutrin XL (taken with Adderall, two seizures in 2009/2010)  - AMT 10mg for skin picking (unknown)  - NAC (ineffective, unsure about her compliance)     Pertinent Background:  See previous notes.  Psych critical item history includes [no critical items].      Interim History                                                                                                        4, 4      Start Time:   12:05p                    End Time: 12:22    Lashonda Rodriguez is a 40 year old female who is being evaluated via a billable telephone visit.      The patient has been notified of following: \"This telephone visit will be conducted via a call between you and your physician/provider. We have found that certain health care needs can be provided without the need for a physical exam.  This service lets us provide the care you need with a short phone conversation.  If a prescription is necessary we can send it directly to your pharmacy.  If lab work is needed we can place an order for that and you can then stop by our lab to have the test done at a later time. If during the course of the call the physician/provider feels a telephone visit is not appropriate, you will not be charged for this service.\"     Patient has given verbal consent for Telephone visit?  Yes      The patient is a fair historian, reports good treatment adherence and was last seen 10/07/2019 when she chose to continue lamotrigine 150mg daily with Adderall 40mg QAM, 20mg " [place].      Since the last visit, she's been pretty OK.  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, she plays drums    Recent Symptoms:   Depression: she denies, she's keeping her perspective on stressors since a coworker recently lost her   Anxiety: some stress, edginess listening to [employer], wishing she could work fully from home   - skin picking increases with stress     ADVERSE EFFECTS: none  MEDICAL CONCERNS: none today     APPETITE: OK, reports weight is stable; eats vegan (focuses on protein shakes, PB, leafy greens)       SLEEP: sleep improves with exercise, with melatonin 10mg, sleeping 6-7 hours nightly     Recent Substance Use:  Alcohol- limits, prefers craft beer  Caffeine- takes No Doze with Adderall for narcolepsy     Per 9/14/2016 note: history of alcohol, cannabis, meth abuse, treatment about 14yo in 1997 for cannabis        Social/ Family History                                  [per patient report]                                 1ea,1ea      FINANCIAL SUPPORT- working  CHILDREN- two daughters (b. 2005, b. 2004)       LIVING SITUATION- lives with  Tono (m. 2015) and two daughters      LEGAL- None  EARLY HISTORY/ EDUCATION- [place] half siblings. BS in Biology from [employer]  SOCIAL/ SPIRITUAL SUPPORT- support from family, friends       CULTURAL INFLUENCES/ IMPACT- UNKNOWN       TRAUMA HISTORY- None  FEELS SAFE AT HOME- Yes  FAMILY HISTORY-  UNKNOWN    Medical / Surgical History                                 Patient Active Problem List   Diagnosis      Major depressive disorder, recurrent episode (H)     Narcolepsy     Bladder spasms     Exercise-induced asthma     CARDIOVASCULAR SCREENING; LDL GOAL LESS THAN 160     Impulse control disorder     Attention deficit disorder     Hx of psychiatric care     Melasma       Past Surgical History:   Procedure Laterality Date     ECTOPIC PREGNANCY SURGERY       HC TOOTH EXTRACTION W/FORCEP        Medical Review of Systems         [2,10]     Pregnant- No    Breastfeeding- No    Contraception- IUD  A comprehensive review of systems was performed and is negative other than noted in the HPI.  Denies head trauma, LOC.      While taking Wellbutrin, she reports history of general tonic clonic seizure 9/20/2009 without aura with postictal confusion; second seizure while with SO on 1/23/10 (tonic clonic; foaming at the mouth with post ictal confusion and lethargy. Narcolepsy diagnosed about 2004. Remote history of febrile convulsion before [age] yo, her daughter has a history of febrile convulsions.     Allergy    Wellbutrin [bupropion] and Penicillins     Wellbutrin- two seizures while taking Wellbutrin and Adderall    Current Medications        Current Outpatient Medications   Medication Sig Dispense Refill     acetaminophen (TYLENOL) 500 MG tablet take 1-2 Tabs by mouth every 6 hours as needed.       amphetamine-dextroamphetamine (ADDERALL) 20 MG tablet Take 2 tabs in the morning, 1 tab at noon. 90 tablet 0     amphetamine-dextroamphetamine (ADDERALL) 20 MG tablet Take 2 tabs in the morning, 1 tab at noon. 90 tablet 0     caffeine (NO-DOZE) 200 MG TABS Take 1 tablet (200 mg) by mouth 2 times daily (Patient taking differently: Take 200 mg by mouth 2 times daily 400 mg qam and 200 qnoon) 150 tablet      cetirizine (ZYRTEC ALLERGY) 10 MG tablet Take 10 mg by mouth daily       Cyanocobalamin (VITAMIN B 12 PO)        fluocin-hydroquinone-tretinoin 0.01-4-0.05 % CREA Twice daily To the face 30 g 2     hydroquinone 4 % CREA Externally  apply topically daily In the morning 30 g 2     Ibuprofen 200 MG capsule take 200-400 mg by mouth every 6 hours as needed.       lamoTRIgine (LAMICTAL) 150 MG tablet Take 1 tablet (150 mg) by mouth daily 90 tablet 1     melatonin 5 MG tablet Take 10 mg by mouth nightly as needed        Vitals         [3, 3]   There were no vitals taken for this visit.   Mental Status Exam        [9, 14 cog gs]     Alertness: alert  and oriented  Appearance: N/A  Behavior/Demeanor: cooperative, pleasant and calm, with N/A eye contact   Speech: normal and regular rate and rhythm  Language: no problems  Psychomotor: N/A  Mood: description consistent with euthymia  Affect: appropriate; was congruent to mood; was congruent to content  Thought Process/Associations: unremarkable  Thought Content:  Reports none;  Denies suicidal ideation, violent ideation, delusions, preoccupations, obsessions , phobia  and magical thinking  Perception:  Reports none;  Denies auditory hallucinations, visual hallucinations, visual distortion seen as shadows , depersonalization and derealization  Insight: fair  Judgment: good  Cognition: (6) does  appear grossly intact; formal cognitive testing was not done  Gait/Station and/or Muscle Strength/Tone: N/A    Labs and Data                          Rating Scales:    N/A    PHQ9 Today:    PHQ 10/4/2018 4/4/2019 10/7/2019   PHQ-9 Total Score 1 0 0   Q9: Thoughts of better off dead/self-harm past 2 weeks Not at all Not at all Not at all     Diagnosis      primary narcolepsy 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 (diagnosed by sleep study in 2007)  recurrent MDD in remission     Assessment      [m2, h3]     Today the following issues were addressed:      : 03/2020- Adderall 20mg #90 last filled by writer on 3/17/2020     PSYCHOTROPIC DRUG INTERACTIONS:  - LAMOTRIGINE, TYLENOL (may result in decreased lamotrigine effectiveness)     Drug Interaction Management: Monitoring for adverse effects, routine vitals, using lowest therapeutic  dose of [psychotropics] and patient is aware of risks    Plan                                                                                                                     m2, h3     1) she chooses to continue lamotrigine 150mg daily, Adderall 40mg QAM, 20mg Qnoon (three months sent today)     - she'll contact office for  and refill of Adderall in 3 months  - takes melatonin 10mg at bedtime PRN  - takes No Doze 200mg BID for narcolepsy     2) stopped family therapy, considering couples counseling      RTC: 6 months, sooner as needed    CRISIS NUMBERS:   Provided routinely in AVS.    Treatment Risk Statement:  The patient understands the risks, benefits, adverse effects and alternatives. Agrees to treatment with the capacity to do so. No medical contraindications to treatment. Agrees to call clinic for any problems. The patient understands to call 911 or go to the nearest ED if life threatening or urgent symptoms occur.     WHODAS 2.0  TODAY total score = N/A; [a 12-item WHODAS 2.0 assessment was not completed by the pt today and/or recorded in EPIC].    PROVIDER:  AYSE Dumont CNP

## 2020-06-15 DIAGNOSIS — G47.419 PRIMARY NARCOLEPSY WITHOUT CATAPLEXY: ICD-10-CM

## 2020-06-15 RX ORDER — DEXTROAMPHETAMINE SACCHARATE, AMPHETAMINE ASPARTATE, DEXTROAMPHETAMINE SULFATE AND AMPHETAMINE SULFATE 5; 5; 5; 5 MG/1; MG/1; MG/1; MG/1
TABLET ORAL
Qty: 90 TABLET | Refills: 0 | Status: SHIPPED | OUTPATIENT
Start: 2020-06-15 | End: 2020-09-14

## 2020-06-15 NOTE — TELEPHONE ENCOUNTER
Last seen: 4/6  RTC: 6 months   Cancel: none   No-show: none   Next appt: none     Incoming refill from patient via phone     Medication requested: amphetamine-dextroamphetamine (ADDERALL) 20 MG tablet   Directions: Take 2 tabs in the morning, 1 tab at noon  Qty: 90  Last refilled: 5/20 #30, 4/16 #30, 3/17 #30     Per chart review, patient should have one refill on file at the pharmacy. Placed a call to [place] [phone] [place] [facility], [place] - [address] and spoke with pharmacist who confirmed that patient does not have refills on file. He mentioned that it is possible the system deleted the script since they all had the same start date. Writer agreed to reach out to provider for approval.

## 2020-06-15 NOTE — TELEPHONE ENCOUNTER
[facility] Call Center    Phone Message    May a detailed message be left on voicemail: yes     Reason for Call: Medication Refill Request    Has the patient contacted the pharmacy for the refill? Yes   Name of medication being requested: Adderall 20mg  Provider who prescribed the medication: Farzaneh Hicks  [facility] [place]  Date medication is needed: 6/20/20         Action Taken: Message routed to:  Other: [facility]    Travel Screening: Not Applicable

## 2020-06-16 NOTE — TELEPHONE ENCOUNTER
- Meds refilled by provider   - Med tab changed to reflect this   - Patient notified     No further action needed by this writer

## 2020-06-18 NOTE — TELEPHONE ENCOUNTER
Per chart review, patient was provided with amphetamine-dextroamphetamine (ADDERALL) 20 MG tablet on 6/15. Writer placed a call to [place] [id number] [place] [facility] [place], [place] - [address] and confirmed refills on file. Patient notified.     No further action needed by this writer

## 2020-07-20 DIAGNOSIS — G47.419 PRIMARY NARCOLEPSY WITHOUT CATAPLEXY: ICD-10-CM

## 2020-07-20 RX ORDER — DEXTROAMPHETAMINE SACCHARATE, AMPHETAMINE ASPARTATE, DEXTROAMPHETAMINE SULFATE AND AMPHETAMINE SULFATE 5; 5; 5; 5 MG/1; MG/1; MG/1; MG/1
TABLET ORAL
Qty: 90 TABLET | Refills: 0 | Status: SHIPPED | OUTPATIENT
Start: 2020-07-20 | End: 2020-10-10

## 2020-07-20 NOTE — TELEPHONE ENCOUNTER
[facility] Call Center    Phone Message    May a detailed message be left on voicemail: yes     Reason for Call: Medication Refill Request    Has the patient contacted the pharmacy for the refill? Yes   Name of medication being requested: Adderall  Provider who prescribed the medication: Farzaneh Hicks  Pharmacy: [facility] - [address] [place] [address], [address], [place], [place], [address]  Date medication is needed: 7/21/20. Patient is down to last day.         Action Taken: Message routed to:  Other: [facility]    Travel Screening: Not Applicable

## 2020-07-20 NOTE — TELEPHONE ENCOUNTER
Patient called to check status of this refill. Writer offered to have a nurse call the patient with an update when they're available. Patient is hoping to have the medication sent to the pharmacy today. She can be reached at [phone].

## 2020-07-20 NOTE — TELEPHONE ENCOUNTER
Last seen: 4/6  RTC: 6 months   Cancel: none   No-show: none   Next appt: 10/5    Incoming refill from patient via phone     Medication requested: amphetamine-dextroamphetamine (ADDERALL) 20 MG tablet   Directions: Take 2 tabs in the morning, 1 tab at noon.   Qty: 90  Last refilled: 6/19 #90, 5/20 #90, 4/16 #90    Will route to provider for approval

## 2020-08-17 ENCOUNTER — TELEPHONE (OUTPATIENT)
Dept: PSYCHIATRY | Facility: CLINIC | Age: 40
End: 2020-08-17

## 2020-08-17 DIAGNOSIS — F33.42 MAJOR DEPRESSIVE DISORDER, RECURRENT EPISODE, IN FULL REMISSION (H): ICD-10-CM

## 2020-08-17 DIAGNOSIS — G47.419 PRIMARY NARCOLEPSY WITHOUT CATAPLEXY: ICD-10-CM

## 2020-08-17 NOTE — TELEPHONE ENCOUNTER
FW: Refill Request   Received: Today   Message Contents   Christ Yost, RN  Sharlene Lyman RN    Phone Number: [phone]            Previous Messages     ----- Message -----   From: Giana Cast   Sent: 8/17/2020   2:58 PM CDT   To: [facility]   Subject: Refill Request                                   Caller:  Josi   Medication:  Adderall 20 mg   Pharmacy and location:  [facility]   Have you contacted the pharmacy: No   How much of medication do you have left:  2 days   Pending appt: Yes   Okay to leave VM:  Yes     Thanks!      Last seen: 4/6  RTC: 6 months   Cancel: none   No-show: none   Next appt: 10/5    Incoming refill from patient via phone     Medication requested: amphetamine-dextroamphetamine (ADDERALL) 20 MG tablet  Directions: Take 2 tabs in the morning, 1 tab at noon  Qty: 90  Last refilled: 7/20 #90, 6/19 #90, 5/20 #90    Will route to provider for approval

## 2020-08-19 RX ORDER — DEXTROAMPHETAMINE SACCHARATE, AMPHETAMINE ASPARTATE, DEXTROAMPHETAMINE SULFATE AND AMPHETAMINE SULFATE 5; 5; 5; 5 MG/1; MG/1; MG/1; MG/1
TABLET ORAL
Qty: 90 TABLET | Refills: 0 | Status: SHIPPED | OUTPATIENT
Start: 2020-08-19 | End: 2020-10-10

## 2020-08-19 NOTE — TELEPHONE ENCOUNTER
Patient called back to check in on status of refill. Reports she will be out after today and wants to make sure that it gets refilled before the end of the day if at all possible. Writer informed her that we are working on it and someone will call her as soon as we have an update.

## 2020-08-19 NOTE — TELEPHONE ENCOUNTER
- Meds refilled by provider   - Med tab changed to reflect this   - Patient notified via VM    No further action needed by this writer

## 2020-08-19 NOTE — TELEPHONE ENCOUNTER
Writer placed a call to patient and updated her that provider is out of clinic and therefore the Adderall script will not be sent in. Patient shared she gets her medication delivered and needs it to be sent in today. Writer agreed to touch base with provider for further recommendations.

## 2020-09-14 DIAGNOSIS — G47.419 PRIMARY NARCOLEPSY WITHOUT CATAPLEXY: ICD-10-CM

## 2020-09-14 RX ORDER — DEXTROAMPHETAMINE SACCHARATE, AMPHETAMINE ASPARTATE, DEXTROAMPHETAMINE SULFATE AND AMPHETAMINE SULFATE 5; 5; 5; 5 MG/1; MG/1; MG/1; MG/1
TABLET ORAL
Qty: 90 TABLET | Refills: 0 | Status: SHIPPED | OUTPATIENT
Start: 2020-09-14 | End: 2020-10-16

## 2020-09-14 NOTE — TELEPHONE ENCOUNTER
[facility] Call Center    Phone Message    May a detailed message be left on voicemail: yes     Reason for Call: Medication Refill Request    Has the patient contacted the pharmacy for the refill? Yes   Name of medication being requested: Adderall 20mg  Provider who prescribed the medication: Farzaneh Hicks  Pharmacy: [facility], [facility]  Date medication is needed: 9/18/20. Patient is going out of town this weekend and is hoping to have it filled before then so she does not have to worry about it while out of town.         Action Taken: Message routed to:  Other: [facility]    Travel Screening: Not Applicable

## 2020-09-14 NOTE — TELEPHONE ENCOUNTER
Last seen: 4/6  RTC: 6 months   Cancel: none   No-show: none   Next appt: 10/5    Incoming refill from patient via phone     Medication requested: amphetamine-dextroamphetamine (ADDERALL) 20 MG tablet   Directions: Take two tabs each morning and one tab at noon   Qty: 90  Last refilled: 8/19 #90, 7/20 #90, 6/19 #90    Will route to provider for approval

## 2020-10-04 ASSESSMENT — ENCOUNTER SYMPTOMS
TREMORS: 0
MUSCLE CRAMPS: 0
SPEECH CHANGE: 0
DIFFICULTY URINATING: 0
MEMORY LOSS: 0
MUSCLE WEAKNESS: 0
MYALGIAS: 1
STIFFNESS: 0
LOSS OF CONSCIOUSNESS: 0
BACK PAIN: 0
DYSURIA: 1
HOT FLASHES: 0
JOINT SWELLING: 0
PARALYSIS: 0
SEIZURES: 0
HEMATURIA: 0
WEAKNESS: 0
TINGLING: 1
NECK PAIN: 0
HEADACHES: 0
DISTURBANCES IN COORDINATION: 0
DECREASED LIBIDO: 0
NUMBNESS: 1
FLANK PAIN: 0
ARTHRALGIAS: 1
DIZZINESS: 0

## 2020-10-04 ASSESSMENT — ANXIETY QUESTIONNAIRES
1. FEELING NERVOUS, ANXIOUS, OR ON EDGE: NOT AT ALL
GAD7 TOTAL SCORE: 0
3. WORRYING TOO MUCH ABOUT DIFFERENT THINGS: NOT AT ALL
5. BEING SO RESTLESS THAT IT IS HARD TO SIT STILL: NOT AT ALL
GAD7 TOTAL SCORE: 0
7. FEELING AFRAID AS IF SOMETHING AWFUL MIGHT HAPPEN: NOT AT ALL
4. TROUBLE RELAXING: NOT AT ALL
6. BECOMING EASILY ANNOYED OR IRRITABLE: NOT AT ALL
7. FEELING AFRAID AS IF SOMETHING AWFUL MIGHT HAPPEN: NOT AT ALL
2. NOT BEING ABLE TO STOP OR CONTROL WORRYING: NOT AT ALL

## 2020-10-05 ENCOUNTER — TELEPHONE (OUTPATIENT)
Dept: PSYCHIATRY | Facility: CLINIC | Age: 40
End: 2020-10-05

## 2020-10-05 ENCOUNTER — VIRTUAL VISIT (OUTPATIENT)
Dept: PSYCHIATRY | Facility: CLINIC | Age: 40
End: 2020-10-05
Attending: NURSE PRACTITIONER
Payer: COMMERCIAL

## 2020-10-05 DIAGNOSIS — Z53.9 ERRONEOUS ENCOUNTER--DISREGARD: Primary | ICD-10-CM

## 2020-10-05 ASSESSMENT — ANXIETY QUESTIONNAIRES: GAD7 TOTAL SCORE: 0

## 2020-10-05 ASSESSMENT — PAIN SCALES - GENERAL: PAINLEVEL: NO PAIN (0)

## 2020-10-05 NOTE — TELEPHONE ENCOUNTER
On 10/05/2020, at 1051, writer called patient at [phone] to confirm Virtual Visit. Writer unable to make contact with patient. Writer left detailed voice message for call back. [phone] left as call back number. Emani Bradshaw MA

## 2020-10-05 NOTE — PATIENT INSTRUCTIONS
Thank you for coming to the [facility] [place] [facility].    Lab Testing:  If you had lab testing today and your results are reassuring or normal they will be mailed to you or sent through [employer] within 7 days. If the lab tests need quick action we will call you with the results. The phone number we will call with results is # [phone] (home) . If this is not the best number please call our clinic and change the number.    Medication Refills:  If you need any refills please call your pharmacy and they will contact us. Our fax number for refills is [phone]. Please allow three business for refill processing. If you need to  your refill at a new pharmacy, please contact the new pharmacy directly. The new pharmacy will help you get your medications transferred.     Scheduling:  If you have any concerns about today's visit or wish to schedule another appointment please call our office during normal business hours [phone] (8-5:00 M-F)    Contact Us:  Please call [phone] during business hours (8-5:00 M-F).  If after clinic hours, or on the weekend, please call  [phone].    Financial Assistance [phone]  [facility] Billing [phone]  Central Billing Office, [facility]: [phone]  [facility] Billing [phone]  Medical Records [phone]      MENTAL HEALTH CRISIS NUMBERS:  For a medical emergency please call  911 or go to the nearest ER.     [facility]:   [facility] -[phone]   Crisis Residence [facility] -[phone]   Walk-In Counseling [facility] -[phone]   COPE 24/7 [place] Mobile Team -[phone] (adults)/[phone] (child)  CHILD: [facility] needs assessment team - [phone]      [facility]:   [facility] - [phone]   Walk-in counseling [facility] - [phone]   Walk-in counseling [facility] - [phone]   Crisis Residence [facility]  [facility] - [phone]  Urgent Care Adult Mental [facility]-[phone] mobile unit/ 24/7 crisis line    National Crisis Numbers:   National Suicide Prevention Lifeline: 4-350-987-TALK (211-893-7418)  Poison Control Center - 0-110-867-0204  [employer]/resources for a list of additional resources (SOS)  Trans Lifeline a hotline for transgender people 1-797-096-0425  [employer] a hotline for LGBT youth 6-307-321-5445  Crisis Text Line: For any crisis 24/7   To: 736395  see www.crisistextline.org  - IF MAKING A CALL FEELS TOO HARD, send a text!         Again thank you for choosing [facility] [place] [facility] and please let us know how we can best partner with you to improve you and your family's health.    You may be receiving a survey regarding this appointment. We would love to have your feedback, both positive and negative. The survey is done by an external company, so your answers are anonymous.

## 2020-10-05 NOTE — PROGRESS NOTES
"VIDEO VISIT  Lashonda Rodriguez is a 40 year old patient who is being evaluated via a billable video visit.      The patient has been notified of following:   \"This video visit will be conducted via a call between you and your physician/provider. We have found that certain health care needs can be provided without the need for an in-person physical exam. This service lets us provide the care you need with a video conversation. If a prescription is necessary we can send it directly to your pharmacy. If lab work is needed we can place an order for that and you can then stop by our lab to have the test done at a later time. Insurers are generally covering virtual visits as they would in-office visits so billing should not be different than normal.  If for some reason you do get billed incorrectly, you should contact the billing office to correct it and that number is in the AVS .    Video Conference to be completed via:  Paulina    Patient has given verbal consent for video visit?:  Yes    Patient would prefer that any video invitations be sent by: Text to cell phone: [phone]      How would patient like to obtain AVS?:  Metabolon    AVS SmartPhrase [PsychAVS] has been placed in 'Patient Instructions':  Yes    "

## 2020-10-08 ENCOUNTER — OFFICE VISIT (OUTPATIENT)
Dept: OBGYN | Facility: CLINIC | Age: 40
End: 2020-10-08
Attending: OBSTETRICS & GYNECOLOGY
Payer: COMMERCIAL

## 2020-10-08 VITALS
HEART RATE: 93 BPM | SYSTOLIC BLOOD PRESSURE: 110 MMHG | DIASTOLIC BLOOD PRESSURE: 74 MMHG | HEIGHT: 66 IN | BODY MASS INDEX: 27.06 KG/M2 | WEIGHT: 168.4 LBS

## 2020-10-08 DIAGNOSIS — Z87.440 PERSONAL HISTORY OF URINARY TRACT INFECTION: ICD-10-CM

## 2020-10-08 DIAGNOSIS — N89.8 VAGINAL ITCHING: ICD-10-CM

## 2020-10-08 DIAGNOSIS — Z00.00 HEALTHCARE MAINTENANCE: Primary | ICD-10-CM

## 2020-10-08 LAB
CLUE CELLS: NEGATIVE
TRICHOMONAS (WET PREP): NEGATIVE
YEAST (WET PREP): NEGATIVE

## 2020-10-08 PROCEDURE — 87210 SMEAR WET MOUNT SALINE/INK: CPT | Performed by: STUDENT IN AN ORGANIZED HEALTH CARE EDUCATION/TRAINING PROGRAM

## 2020-10-08 PROCEDURE — G0145 SCR C/V CYTO,THINLAYER,RESCR: HCPCS | Performed by: OBSTETRICS & GYNECOLOGY

## 2020-10-08 PROCEDURE — 99385 PREV VISIT NEW AGE 18-39: CPT | Mod: GE | Performed by: OBSTETRICS & GYNECOLOGY

## 2020-10-08 PROCEDURE — G0463 HOSPITAL OUTPT CLINIC VISIT: HCPCS

## 2020-10-08 PROCEDURE — 87624 HPV HI-RISK TYP POOLED RSLT: CPT | Performed by: OBSTETRICS & GYNECOLOGY

## 2020-10-08 ASSESSMENT — MIFFLIN-ST. JEOR: SCORE: 1455.61

## 2020-10-08 NOTE — PROGRESS NOTES
Progress Note    SUBJECTIVE:  Lashonda Rodriguez is an 40 year old  presents for annual exam. Patient reports UTI earlier this year which was initially treated with macrobid. She then developed symptoms of a yeast infection and was treated accordingly. She then experienced recurrence of UTI symptoms and was treated with a different medication which brought complete relief of symptoms, however shortly thereafter she had return of vaginal itching and discomfort. She then took a course of metronidazole with improvement in symptoms. She notes that she required 6 months of UTI suppression years ago. She also complains of intermittent vaginal itching near the introitus. She notes improvement in symptoms with irregular use of hydrocortisone cream. She also notes some flare in symptoms following intercourse. She is in a monogamous relationship with her  and has no STI concerns. She denies dyspareunia, hematuria, dysuria, fever/chills, CP, SOB, hair loss, or feeling chilled. She denies family history of breast, colon, ovarian, or pancreatic cancer. Notes family history of thyroid issues.       Menstrual History:  Mirena IUD in place, does not have regular periods.     Last Pap/HPV in 2015. Denies history of abnormals  History of abnormal Pap smear: NO    Mammogram current: not applicable     Colonoscopy not indicated    HISTORY:  Prescription Medications as of 10/8/2020       Rx Number Disp Refills Start End Last Dispensed Date Next Fill Date Owning Pharmacy    acetaminophen (TYLENOL) 500 MG tablet            Sig: take 1-2 Tabs by mouth every 6 hours as needed.    Class: Historical    Route: Oral    amphetamine-dextroamphetamine (ADDERALL) 20 MG tablet  90 tablet 0 9/14/2020    [facility] -- [place] [place], [place] - [address]    Sig: Take 2 tabs in the morning, 1 tab at noon.    Class: E-Prescribe    Earliest Fill Date: 9/14/2020    amphetamine-dextroamphetamine (ADDERALL) 20 MG tablet   90 tablet 0 8/19/2020    [facility] -- [address]    Sig: Take two tabs each morning and one tab at noon    Class: E-Prescribe    Earliest Fill Date: 8/19/2020    amphetamine-dextroamphetamine (ADDERALL) 20 MG tablet  90 tablet 0 7/20/2020    [facility] -- [place], [place] - [address]    Sig: Take 2 tabs in the morning, 1 tab at noon.    Class: E-Prescribe    Earliest Fill Date: 7/20/2020    caffeine (NO-DOZE) 200 MG TABS  150 tablet  1/27/2014        Sig: Take 1 tablet (200 mg) by mouth 2 times daily    Class: No Print Out    Route: Oral    cetirizine (ZYRTEC ALLERGY) 10 MG tablet            Sig: Take 10 mg by mouth daily    Class: Historical    Route: Oral    Cyanocobalamin (VITAMIN B 12 PO)            Class: Historical    Route: Oral    fluocin-hydroquinone-tretinoin 0.01-4-0.05 % CREA  30 g 2 6/6/2019    [id number] [place] [place] [facility] [address] [facility]    Sig: Twice daily To the face    Class: E-Prescribe    hydroquinone 4 % CREA  30 g 2 2/26/2016    [id number] [place], [place] [facility] [address] [facility]    Sig: Externally apply topically daily In the morning    Class: E-Prescribe    Route: Apply externally    Ibuprofen 200 MG capsule            Sig: take 200-400 mg by mouth every 6 hours as needed.    Class: Historical    Route: Oral    lamoTRIgine (LAMICTAL) 150 MG tablet  90 tablet 1 4/6/2020    [id number] [place] [facility] [address]    Sig: Take 1 tablet (150 mg) by mouth daily    Class: E-Prescribe    Route: Oral    melatonin 5 MG tablet            Sig: Take 10 mg by mouth nightly as needed     Class: Historical    Route: Oral        Allergies   Allergen Reactions     Wellbutrin [Bupropion] Other (See Comments)     Two seizures while on Adderall and Wellbutrin     Penicillins Rash     Immunization History   Administered Date(s) Administered     Influenza (H1N1) 01/29/2010     Influenza  Vaccine IM > 6 months Valent IIV4 2015     Tdap (Adacel,Boostrix) 2012       OB History   No obstetric history on file.     Past Medical History:   Diagnosis Date     Anxiety      Bladder spasms      Depression 2009     Exercise-induced asthma 2009     Narcolepsy 2009     Seizure (H) 2 approx 2502-0248    Secondary to Wellbutrin and stimulant use     Past Surgical History:   Procedure Laterality Date     ECTOPIC PREGNANCY SURGERY       HC TOOTH EXTRACTION W/FORCEP       Family History   Problem Relation Age of Onset     Melanoma No family hx of      Skin Cancer No family hx of      Social History     Socioeconomic History     Marital status:      Spouse name: Not on file     Number of children: Not on file     Years of education: Not on file     Highest education level: Not on file   Occupational History     Not on file   Social Needs     Financial resource strain: Not on file     Food insecurity     Worry: Not on file     Inability: Not on file     Transportation needs     Medical: Not on file     Non-medical: Not on file   Tobacco Use     Smoking status: Former Smoker     Types: Cigarettes     Quit date: 2008     Years since quittin.7     Smokeless tobacco: Never Used   Substance and Sexual Activity     Alcohol use: Yes     Comment: 3 beer every 2 days/week     Drug use: No     Sexual activity: Yes     Partners: Male     Birth control/protection: I.U.D.   Lifestyle     Physical activity     Days per week: Not on file     Minutes per session: Not on file     Stress: Not on file   Relationships     Social connections     Talks on phone: Not on file     Gets together: Not on file     Attends Zoroastrianism service: Not on file     Active member of club or organization: Not on file     Attends meetings of clubs or organizations: Not on file     Relationship status: Not on file     Intimate partner violence     Fear of current or ex partner: Not on file     Emotionally abused:  Not on file     Physically abused: Not on file     Forced sexual activity: Not on file   Other Topics Concern     Parent/sibling w/ CABG, MI or angioplasty before 65F 55M? Not Asked   Social History Narrative     Not on file       ROS    EXAM:  There were no vitals taken for this visit. There is no height or weight on file to calculate BMI.  General - pleasant female in no acute distress.  Skin - no suspicious lesions or rashes  EENT-  PERRLA, euthyroid with out palpable nodules  Neck - supple without lymphadenopathy.  Lungs - clear to auscultation bilaterally.  Heart - regular rate and rhythm without murmur.  Abdomen - soft, nontender, nondistended, no masses or organomegaly noted.  Musculoskeletal - no gross deformities.  Neurological - normal strength, sensation, and mental status.    Breast Exam:  Breast: area around bilateral areolas with lighter skin tone,  No dimpling or lesions seen.   Breasts supple, non-tender with palpation, no dominant mass, nodularity, or nipple discharge noted bilaterally. Axillary nodes negative.      Pelvic Exam:  EG/BUS: Pubic hair shaven. Piercing present through clitoral smith, Normal genital architecture without lesions, erythema or abnormal secretions Bartholin's, Urethra, Booker's normal   Urethral meatus: normal   Urethra: no masses, tenderness, or scarring   Bladder: no masses or tenderness   Vagina: moist, pink, rugae with creamy, white and odorless  secretions  Cervix: no lesions  Uterus: anteverted,   Adnexa: Within normal limits and No masses, nodularity, tenderness  Rectum:anus normal       ASSESSMENT/PLAN: 41yo presents for annual visit.   Encounter Diagnoses   Name Primary?     Healthcare maintenance Yes     Vaginal itching      Personal history of urinary tract infection         Health Maintenance:   -PAP/HPV collected today. Prefers results via [employer]  -Discussed mammograms starting at age 40    History of UTI  -Used suppression years ago with relief in symptoms  -Had  UTI x 2 this year  -UA/UC ordered if recurrence in symptoms  -If confirmed UTI recommend urogyn consult for recurrent UTI    Vaginal Itching  -Negative wet prep  -no obvious lesions or signs of Lichen Sclerosis, Lichen planus, Lichen simplex  -Discussed vulvar hygiene including: no soaps directly on vulva, avoidance of fragranced bath soaks/bubble baths, recommend use of silicon based lubricants with intercourse. May try condoms with intercourse to see if improvement in symptoms  -return if worsening of symptoms    Additional teaching done at this visit regarding self breast exam, exercise and birth control.    Return to clinic in one year.  Follow-up as needed.    Discussed with Carrie Terrell, MD Alicia Myhre, DO  Obstetrics and Gynecology, PGY-4  October 8, 2020    The Patient was seen in [facility] by MYHRE, ALICIA.  I reviewed the history & exam. Assessment and plan were jointly made.    Consuelo Joseph MD

## 2020-10-08 NOTE — LETTER
10/8/2020       RE: Lashonda Rodriguez  [address]  [facility] [id number]     Dear Colleague,    Thank you for referring your patient, Lashonda Rodriguez, to the [facility]OM[facility] [place] [facility] Please see a copy of my visit note below.    Progress Note    SUBJECTIVE:  Lashonda Rodriguez is an 40 year old  presents for annual exam. Patient reports UTI earlier this year which was initially treated with macrobid. She then developed symptoms of a yeast infection and was treated accordingly. She then experienced recurrence of UTI symptoms and was treated with a different medication which brought complete relief of symptoms, however shortly thereafter she had return of vaginal itching and discomfort. She then took a course of metronidazole with improvement in symptoms. She notes that she required 6 months of UTI suppression years ago. She also complains of intermittent vaginal itching near the introitus. She notes improvement in symptoms with irregular use of hydrocortisone cream. She also notes some flare in symptoms following intercourse. She is in a monogamous relationship with her  and has no STI concerns. She denies dyspareunia, hematuria, dysuria, fever/chills, CP, SOB, hair loss, or feeling chilled. She denies family history of breast, colon, ovarian, or pancreatic cancer. Notes family history of thyroid issues.       Menstrual History:  Mirena IUD in place, does not have regular periods.     Last Pap/HPV in 2015. Denies history of abnormals  History of abnormal Pap smear: NO    Mammogram current: not applicable     Colonoscopy not indicated    HISTORY:  Prescription Medications as of 10/8/2020       Rx Number Disp Refills Start End Last Dispensed Date Next Fill Date Owning Pharmacy    acetaminophen (TYLENOL) 500 MG tablet            Sig: take 1-2 Tabs by mouth every 6 hours as needed.    Class: Historical    Route:  Oral    amphetamine-dextroamphetamine (ADDERALL) 20 MG tablet  90 tablet 0 9/14/2020    Capsule -- [address]    Sig: Take 2 tabs in the morning, 1 tab at noon.    Class: E-Prescribe    Earliest Fill Date: 9/14/2020    amphetamine-dextroamphetamine (ADDERALL) 20 MG tablet  90 tablet 0 8/19/2020    Capsule -- [address]    Sig: Take two tabs each morning and one tab at noon    Class: E-Prescribe    Earliest Fill Date: 8/19/2020    amphetamine-dextroamphetamine (ADDERALL) 20 MG tablet  90 tablet 0 7/20/2020    Capsule -- [address]    Sig: Take 2 tabs in the morning, 1 tab at noon.    Class: E-Prescribe    Earliest Fill Date: 7/20/2020    caffeine (NO-DOZE) 200 MG TABS  150 tablet  1/27/2014        Sig: Take 1 tablet (200 mg) by mouth 2 times daily    Class: No Print Out    Route: Oral    cetirizine (ZYRTEC ALLERGY) 10 MG tablet            Sig: Take 10 mg by mouth daily    Class: Historical    Route: Oral    Cyanocobalamin (VITAMIN B 12 PO)            Class: Historical    Route: Oral    fluocin-hydroquinone-tretinoin 0.01-4-0.05 % CREA  30 g 2 6/6/2019    [id number] [place], [place] - [address]    Sig: Twice daily To the face    Class: E-Prescribe    hydroquinone 4 % CREA  30 g 2 2/26/2016    [id number] [place], [place] - [address]    Sig: Externally apply topically daily In the morning    Class: E-Prescribe    Route: Apply externally    Ibuprofen 200 MG capsule            Sig: take 200-400 mg by mouth every 6 hours as needed.    Class: Historical    Route: Oral    lamoTRIgine (LAMICTAL) 150 MG tablet  90 tablet 1 4/6/2020    [facility] [address] [place] [facility] [address]    Sig: Take 1 tablet (150 mg) by mouth daily    Class: E-Prescribe    Route: Oral    melatonin 5 MG tablet            Sig: Take 10 mg by  mouth nightly as needed     Class: Historical    Route: Oral        Allergies   Allergen Reactions     Wellbutrin [Bupropion] Other (See Comments)     Two seizures while on Adderall and Wellbutrin     Penicillins Rash     Immunization History   Administered Date(s) Administered     Influenza (H1N1) 2010     Influenza Vaccine IM > 6 months Valent IIV4 2015     Tdap (Adacel,Boostrix) 2012       OB History   No obstetric history on file.     Past Medical History:   Diagnosis Date     Anxiety      Bladder spasms      Depression 2009     Exercise-induced asthma 2009     Narcolepsy 2009     Seizure (H) 2 approx [id number]    Secondary to Wellbutrin and stimulant use     Past Surgical History:   Procedure Laterality Date     ECTOPIC PREGNANCY SURGERY       HC TOOTH EXTRACTION W/FORCEP       Family History   Problem Relation Age of Onset     Melanoma No family hx of      Skin Cancer No family hx of      Social History     Socioeconomic History     Marital status:      Spouse name: Not on file     Number of children: Not on file     Years of education: Not on file     Highest education level: Not on file   Occupational History     Not on file   Social Needs     Financial resource strain: Not on file     Food insecurity     Worry: Not on file     Inability: Not on file     Transportation needs     Medical: Not on file     Non-medical: Not on file   Tobacco Use     Smoking status: Former Smoker     Types: Cigarettes     Quit date: 2008     Years since quittin.7     Smokeless tobacco: Never Used   Substance and Sexual Activity     Alcohol use: Yes     Comment: 3 beer every 2 days/week     Drug use: No     Sexual activity: Yes     Partners: Male     Birth control/protection: I.U.D.   Lifestyle     Physical activity     Days per week: Not on file     Minutes per session: Not on file     Stress: Not on file   Relationships     Social connections     Talks on phone: Not on file      Gets together: Not on file     Attends Yarsani service: Not on file     Active member of club or organization: Not on file     Attends meetings of clubs or organizations: Not on file     Relationship status: Not on file     Intimate partner violence     Fear of current or ex partner: Not on file     Emotionally abused: Not on file     Physically abused: Not on file     Forced sexual activity: Not on file   Other Topics Concern     Parent/sibling w/ CABG, MI or angioplasty before 65F 55M? Not Asked   Social History Narrative     Not on file       ROS    EXAM:  There were no vitals taken for this visit. There is no height or weight on file to calculate BMI.  General - pleasant female in no acute distress.  Skin - no suspicious lesions or rashes  EENT-  PERRLA, euthyroid with out palpable nodules  Neck - supple without lymphadenopathy.  Lungs - clear to auscultation bilaterally.  Heart - regular rate and rhythm without murmur.  Abdomen - soft, nontender, nondistended, no masses or organomegaly noted.  Musculoskeletal - no gross deformities.  Neurological - normal strength, sensation, and mental status.    Breast Exam:  Breast: area around bilateral areolas with lighter skin tone,  No dimpling or lesions seen.   Breasts supple, non-tender with palpation, no dominant mass, nodularity, or nipple discharge noted bilaterally. Axillary nodes negative.      Pelvic Exam:  EG/BUS: Pubic hair shaven. Piercing present through clitoral smith, Normal genital architecture without lesions, erythema or abnormal secretions Bartholin's, Urethra, Tynan's normal   Urethral meatus: normal   Urethra: no masses, tenderness, or scarring   Bladder: no masses or tenderness   Vagina: moist, pink, rugae with creamy, white and odorless  secretions  Cervix: no lesions  Uterus: anteverted,   Adnexa: Within normal limits and No masses, nodularity, tenderness  Rectum:anus normal       ASSESSMENT/PLAN: 39yo presents for annual visit.   Encounter  Diagnoses   Name Primary?     Healthcare maintenance Yes     Vaginal itching      Personal history of urinary tract infection         Health Maintenance:   -PAP/HPV collected today. Prefers results via MyChart  -Discussed mammograms starting at age 40    History of UTI  -Used suppression years ago with relief in symptoms  -Had UTI x 2 this year  -UA/UC ordered if recurrence in symptoms  -If confirmed UTI recommend urogyn consult for recurrent UTI    Vaginal Itching  -Negative wet prep  -no obvious lesions or signs of Lichen Sclerosis, Lichen planus, Lichen simplex  -Discussed vulvar hygiene including: no soaps directly on vulva, avoidance of fragranced bath soaks/bubble baths, recommend use of silicon based lubricants with intercourse. May try condoms with intercourse to see if improvement in symptoms  -return if worsening of symptoms    Additional teaching done at this visit regarding self breast exam, exercise and birth control.    Return to clinic in one year.  Follow-up as needed.    Discussed with Carrie Terrell, MD Alicia Myhre,   Obstetrics and Gynecology, PGY-4  October 8, 2020

## 2020-10-09 ENCOUNTER — ANCILLARY PROCEDURE (OUTPATIENT)
Dept: GENERAL RADIOLOGY | Facility: CLINIC | Age: 40
End: 2020-10-09
Attending: FAMILY MEDICINE
Payer: COMMERCIAL

## 2020-10-09 ENCOUNTER — OFFICE VISIT (OUTPATIENT)
Dept: ORTHOPEDICS | Facility: CLINIC | Age: 40
End: 2020-10-09
Payer: COMMERCIAL

## 2020-10-09 VITALS — WEIGHT: 168 LBS | BODY MASS INDEX: 27 KG/M2 | HEIGHT: 66 IN

## 2020-10-09 DIAGNOSIS — M25.521 RIGHT ELBOW PAIN: Primary | ICD-10-CM

## 2020-10-09 PROCEDURE — 99203 OFFICE O/P NEW LOW 30 MIN: CPT | Performed by: FAMILY MEDICINE

## 2020-10-09 PROCEDURE — 73080 X-RAY EXAM OF ELBOW: CPT | Mod: RT | Performed by: RADIOLOGY

## 2020-10-09 ASSESSMENT — PAIN SCALES - GENERAL: PAINLEVEL: NO PAIN (0)

## 2020-10-09 ASSESSMENT — MIFFLIN-ST. JEOR: SCORE: 1453.79

## 2020-10-09 NOTE — PROGRESS NOTES
"NEW PATIENT INTAKE QUESTIONNAIRE  [facility] 10/9/2020      Lashondacheco Rodriguez's chief complaint for this visit includes:  Chief Complaint   Patient presents with     Elbow Pain     Right elbow pain, reporting most pain when arm is straight. Also reporting more forearm pain as well. States her right hand goes numb as well. Symptoms started about 6 weeks ago.      PCP: Selma Alba    Referring Provider:  No referring provider defined for this encounter.    Ht 1.676 m (5' 6\")   Wt 76.2 kg (168 lb)   BMI 27.12 kg/m    No Pain (0)     Do you need any medication refills at today's visit?No       Reason for Visit:    What part of your body is injured / painful?  right elbow    What caused the injury /pain? Recreational/competitive sports injury - Other:    How long ago did your injury occur or pain begin?  6 weeks ago    What are your most bothersome symptoms? Pain    How would you characterize your symptom? aching    What makes your symptoms better? Rest    What makes your symptoms worse? Movement    Have you been previously seen for this problem? No    Has been doing Orange theory which she thinks aggravated the pain.  Some stiffness.  Pain is worse when fully extended.  Localizes to the posterior lateral elbow.  Occasionally will have some numbness in her hand and fingers.  Thumb index and middle finger.  No pain at rest.  Has not had this previously.  Tried some massage which was not particularly helpful.  Has not tried OTC medications.    Medical History:    Medical History: reviewed in chart    Have you had surgery on this body part before? No    Medications: reviewed up to date    Allergies: Yes: In chart       Social History:    Occupation:     Handedness: Right    Exercise: Resistance/Orange Theory     Review of Systems:    Have you recently had a a fever, chills, weight loss? No    Do you have any vision problems? No    Do you have any chest pain or edema? No    Do you have any " "shortness of breath or wheezing?  No    Do you have stomach problems? No    Do you have any urinary track issues? No    Do you have any numbness or focal weakness? No    Do you have diabetes? No    Do you have problems with bleeding or clotting? No    Do you have an rashes or other skin lesions? No        Past Medical History, Current Medications, and Allergies are reviewed in the electronic medical record as appropriate.       EXAM:Ht 1.676 m (5' 6\")   Wt 76.2 kg (168 lb)   BMI 27.12 kg/m      EXAM:  Patient is alert in no acute distress, pleasant and conversational  right Elbow:   Skin is intact, no erythema or ecchymosis  Neurovascularly intact  No joint effusion or soft tissue swelling     AROM: Zero extension to approximately 130  flexion  Supination to approximatley 90 ; Pronation to approximately 90 .      Some pain with terminal extension    Manual Strength Testing: Flexion: 5/5, Extension: 5/5, Supination: 5/5, Pronation: 5/5, Wrist flexion 5/5, Wrist extension 5/5   Pain with supination against resistance.    Palpation:   TTP over the posterior elbow between the lateral epicondyle and olecranon.  negative tenderness to palpation of the olecranon process  negative tenderness to palpation of the medial epicondyle  negative tenderness to palpation of the lateral epicondyle   negative tenderness with valgus stress  negativetenderness with varus stress    negative ligamnetous laxity with valgus stress  negative ligamentous laxity with varus stress      Imaging: Three-view x-rays of the right elbow are performed and reviewed independently demonstrating no acute fracture dislocation.  No DJD.  EMR for formal radiology report.      Assessment: Patient is a 40 year old female with right posterior elbow pain that is likely secondary to posterior elbow impingement overuse.  Some irritation supinator as well.  I suspect that this is also causing some transient irritation of the radial nerve which is giving her pain " into the forearm and hand.    Recommendations:   Reviewed imaging and assessment with the patient in detail  Recommended avoidance of aggravating activities and discussed these in particular.  Recommended a course of anti-inflammatory and discussed dosing of ibuprofen and naproxen  Recommended icing after activity  She was provided with a home exercise program and encouraged to follow-up with hand therapy  If she is not experiencing any improvement in 4 to 6 weeks have recommended follow-up in clinic for reevaluation.    Eron Onofre MD

## 2020-10-09 NOTE — PATIENT INSTRUCTIONS
Thanks for coming today.  Ortho/Sports Medicine Clinic  [address] [place] [address]    To schedule future appointments in Ortho Clinic, you may call [phone].    To schedule ordered imaging by your provider:   Call [facility] [phone]    To schedule an injection ordered by your provider:  Call [facility] Injection scheduling line: [phone]  [facility] available online at:  [email or web address]    Please call if any further questions or concerns ([phone]).  Clinic hours 8 am to 5 pm.    Return to clinic (call) if symptoms worsen or fail to improve.

## 2020-10-10 ENCOUNTER — OFFICE VISIT (OUTPATIENT)
Dept: URGENT CARE | Facility: URGENT CARE | Age: 40
End: 2020-10-10
Payer: COMMERCIAL

## 2020-10-10 VITALS
DIASTOLIC BLOOD PRESSURE: 68 MMHG | HEART RATE: 87 BPM | HEIGHT: 66 IN | SYSTOLIC BLOOD PRESSURE: 106 MMHG | TEMPERATURE: 98 F | BODY MASS INDEX: 26.52 KG/M2 | WEIGHT: 165 LBS | OXYGEN SATURATION: 100 %

## 2020-10-10 DIAGNOSIS — R82.90 NONSPECIFIC FINDING ON EXAMINATION OF URINE: ICD-10-CM

## 2020-10-10 DIAGNOSIS — R30.0 DYSURIA: Primary | ICD-10-CM

## 2020-10-10 DIAGNOSIS — N30.00 ACUTE CYSTITIS WITHOUT HEMATURIA: ICD-10-CM

## 2020-10-10 LAB
BACTERIA #/AREA URNS HPF: ABNORMAL /HPF
RBC #/AREA URNS AUTO: ABNORMAL /HPF
WBC #/AREA URNS AUTO: ABNORMAL /HPF

## 2020-10-10 PROCEDURE — 81015 MICROSCOPIC EXAM OF URINE: CPT | Performed by: PHYSICIAN ASSISTANT

## 2020-10-10 PROCEDURE — 99203 OFFICE O/P NEW LOW 30 MIN: CPT | Performed by: PHYSICIAN ASSISTANT

## 2020-10-10 PROCEDURE — 87086 URINE CULTURE/COLONY COUNT: CPT | Performed by: PHYSICIAN ASSISTANT

## 2020-10-10 RX ORDER — CEPHALEXIN 500 MG/1
500 CAPSULE ORAL 2 TIMES DAILY
Qty: 14 CAPSULE | Refills: 0 | Status: SHIPPED | OUTPATIENT
Start: 2020-10-10 | End: 2020-10-17

## 2020-10-10 ASSESSMENT — ENCOUNTER SYMPTOMS
MUSCULOSKELETAL NEGATIVE: 1
GASTROINTESTINAL NEGATIVE: 1
FREQUENCY: 1
RESPIRATORY NEGATIVE: 1
DIFFICULTY URINATING: 0
CONSTITUTIONAL NEGATIVE: 1
NEUROLOGICAL NEGATIVE: 1
CARDIOVASCULAR NEGATIVE: 1
FLANK PAIN: 0
PSYCHIATRIC NEGATIVE: 1
DYSURIA: 1
HEMATURIA: 0

## 2020-10-10 ASSESSMENT — MIFFLIN-ST. JEOR: SCORE: 1440.19

## 2020-10-10 NOTE — PATIENT INSTRUCTIONS
Patient Education     Understanding Urinary Tract Infections (UTIs)  Most UTIs are caused by bacteria, although they may also be caused by viruses or fungi. Bacteria from the bowel are the most common source of infection. The infection may start because of any of the following:    Sexual activity. During sex, bacteria can travel from the penis, vagina, or rectum into the urethra.     Bacteria on the skin outside the rectum may travel into the urethra. This is more common in women since the rectum and urethra are closer to each other than in men. Wiping from front to back after using the toilet and keeping the area clean can help prevent germs from getting to the urethra.    Blockage of urine flow through the urinary tract. If urine sits too long, germs may start to grow out of control.      Parts of the urinary tract  The infection can occur in any part of the urinary tract.    The kidneys collect and store urine.    The ureters carry urine from the kidneys to the bladder.    The bladder holds urine until you are ready to let it out.    The urethra carries urine from the bladder out of the body. It is shorter in women, so bacteria can move through it more easily. The urethra is longer in men, so a UTI is less likely to reach the bladder or kidneys in men.  Date Last Reviewed: 1/1/2017 2000-2019 The [employer]. [address] [place] [address], [place] [address]. All rights reserved. This information is not intended as a substitute for professional medical care. Always follow your healthcare professional's instructions.

## 2020-10-10 NOTE — PROGRESS NOTES
SUBJECTIVE:   Lashonda Rodriguez is a 40 year old female presenting with a chief complaint of   Chief Complaint   Patient presents with     Urgent Care     Pt in clinic c/o dysuria, flank pain, back pain, and urgency.     Dysuria     Urgency       She is an established patient of [facility].    UTI    Onset of symptoms was 1day(s).  Course of illness is same  Severity moderate  Current and associated symptoms dysuria, frequency, urgency, burning and voiding in small amounts  Treatment and measures tried Increase fluid intake  Predisposing factors include history of frequent UTI's  Patient denies rigors, flank pain, temperature > 101 degrees F., vomiting, taking Coumadin, GFR less than 30 within the last year, vaginal discharge, vaginal odor, vaginal itching and dyspareunia      Patient states she is having lower back pain but not flank pain.         Review of Systems   Constitutional: Negative.    HENT: Negative.    Respiratory: Negative.    Cardiovascular: Negative.    Gastrointestinal: Negative.    Genitourinary: Positive for dysuria, frequency and urgency. Negative for difficulty urinating, dyspareunia, enuresis, flank pain, hematuria, menstrual problem, pelvic pain, vaginal bleeding, vaginal discharge and vaginal pain.   Musculoskeletal: Negative.    Neurological: Negative.    Psychiatric/Behavioral: Negative.        Past Medical History:   Diagnosis Date     Anxiety      Bladder spasms      Depression 01/05/2009     Exercise-induced asthma 06/11/2009     Narcolepsy 12/28/2009     Seizure (H) 2 approx [id number]    Secondary to Wellbutrin and stimulant use     Family History   Problem Relation Age of Onset     Thyroid Disease Cousin      Melanoma No family hx of      Skin Cancer No family hx of      Current Outpatient Medications   Medication Sig Dispense Refill     acetaminophen (TYLENOL) 500 MG tablet take 1-2 Tabs by mouth every 6 hours as needed.       amphetamine-dextroamphetamine (ADDERALL) 20 MG tablet  "Take 2 tabs in the morning, 1 tab at noon. 90 tablet 0     amphetamine-dextroamphetamine (ADDERALL) 20 MG tablet Take 2 tabs in the morning, 1 tab at noon. 90 tablet 0     caffeine (NO-DOZE) 200 MG TABS Take 1 tablet (200 mg) by mouth 2 times daily (Patient taking differently: Take 200 mg by mouth 2 times daily 400 mg qam and 200 qnoon) 150 tablet      cetirizine (ZYRTEC ALLERGY) 10 MG tablet Take 10 mg by mouth daily       fluocin-hydroquinone-tretinoin 0.01-4-0.05 % CREA Twice daily To the face 30 g 2     hydroquinone 4 % CREA Externally apply topically daily In the morning 30 g 2     Ibuprofen 200 MG capsule take 200-400 mg by mouth every 6 hours as needed.       lamoTRIgine (LAMICTAL) 150 MG tablet Take 1 tablet (150 mg) by mouth daily 90 tablet 1     melatonin 5 MG tablet Take 10 mg by mouth nightly as needed        Multiple Vitamins-Minerals (MULTIVITAMIN ADULT PO)        amphetamine-dextroamphetamine (ADDERALL) 20 MG tablet Take two tabs each morning and one tab at noon 90 tablet 0     Cyanocobalamin (VITAMIN B 12 PO)        Social History     Tobacco Use     Smoking status: Former Smoker     Packs/day: 0.00     Years: 0.00     Pack years: 0.00     Types: Cigarettes     Quit date: 2008     Years since quittin.7     Smokeless tobacco: Never Used   Substance Use Topics     Alcohol use: Yes     Comment: 3 beer every 2 days/week       OBJECTIVE  /68   Pulse 87   Temp 98  F (36.7  C) (Oral)   Ht 1.676 m (5' 6\")   Wt 74.8 kg (165 lb)   SpO2 100%   BMI 26.63 kg/m      Physical Exam  Constitutional:       General: She is not in acute distress.     Appearance: Normal appearance. She is normal weight. She is not ill-appearing, toxic-appearing or diaphoretic.   HENT:      Head: Normocephalic and atraumatic.   Cardiovascular:      Rate and Rhythm: Normal rate and regular rhythm.      Pulses: Normal pulses.      Heart sounds: Normal heart sounds. No murmur. No friction rub. No gallop.    Pulmonary:    "   Effort: Pulmonary effort is normal. No respiratory distress.      Breath sounds: Normal breath sounds. No stridor. No wheezing, rhonchi or rales.   Chest:      Chest wall: No tenderness.   Abdominal:      General: Abdomen is flat. Bowel sounds are normal. There is no distension.      Palpations: Abdomen is soft. There is no mass.      Tenderness: There is no abdominal tenderness. There is no right CVA tenderness, left CVA tenderness, guarding or rebound.      Hernia: No hernia is present.   Neurological:      General: No focal deficit present.      Mental Status: She is alert and oriented to person, place, and time. Mental status is at baseline.      Gait: Gait normal.   Psychiatric:         Mood and Affect: Mood normal.         Behavior: Behavior normal.         Thought Content: Thought content normal.         Judgment: Judgment normal.       Results for orders placed or performed in visit on 10/10/20   Urine Microscopic     Status: Abnormal   Result Value Ref Range    WBC Urine 10-25 (A) OTO5^0 - 5 /HPF    RBC Urine 5-10 (A) OTO2^O - 2 /HPF    Bacteria Urine Few (A) NEG^Negative /HPF       ASSESSMENT/PLAN:    (R30.0) Dysuria  (primary encounter diagnosis)  Plan: Urine Microscopic, CANCELED: *UA reflex to         Microscopic and Culture ([place] and [place]         Clinics (except [place] and [place])    (R82.90) Nonspecific finding on examination of urine  Plan: Urine Culture Aerobic Bacterial    (N30.00) Acute cystitis without hematuria  Plan: cephALEXin (KEFLEX) 500 MG capsule     Patient should drink 1.5-2 liters of water per day. Okay to use Azo over the counter and/or cranberry juice for symptomatic relief (Note that Azo will cause urine to become orange/red. If you are a contact lens-wearer, contacts may become discolored.This is normal). Okay to continue taking prescribed antibiotic for full course. Patient was advised to return to clinic if symptoms do not improve in the amount of time specified in  the AVS or if symptoms worsen. Patient educated on red flag symptoms and asked to go directly to the ED if symptoms present themselves.      Patient given handout on how to prevent UTIs in the future.    Patient was advised to return to clinic if symptoms do not improve in the amount of time specified in the AVS or if symptoms worsen. Patient educated on red flag symptoms and asked to go directly to the ED if symptoms present themselves.     Darron Meza PA-C on 10/10/2020 at 4:23 PM

## 2020-10-12 LAB
BACTERIA SPEC CULT: NORMAL
Lab: NORMAL
SPECIMEN SOURCE: NORMAL

## 2020-10-13 ENCOUNTER — THERAPY VISIT (OUTPATIENT)
Dept: OCCUPATIONAL THERAPY | Facility: CLINIC | Age: 40
End: 2020-10-13
Payer: COMMERCIAL

## 2020-10-13 DIAGNOSIS — R20.0 NUMBNESS AND TINGLING IN RIGHT HAND: ICD-10-CM

## 2020-10-13 DIAGNOSIS — M25.521 RIGHT ELBOW PAIN: Primary | ICD-10-CM

## 2020-10-13 DIAGNOSIS — R20.2 NUMBNESS AND TINGLING IN RIGHT HAND: ICD-10-CM

## 2020-10-13 LAB
COPATH REPORT: NORMAL
PAP: NORMAL

## 2020-10-13 PROCEDURE — 97165 OT EVAL LOW COMPLEX 30 MIN: CPT | Mod: GO | Performed by: OCCUPATIONAL THERAPIST

## 2020-10-13 PROCEDURE — 97110 THERAPEUTIC EXERCISES: CPT | Mod: GO | Performed by: OCCUPATIONAL THERAPIST

## 2020-10-13 PROCEDURE — 97112 NEUROMUSCULAR REEDUCATION: CPT | Mod: GO | Performed by: OCCUPATIONAL THERAPIST

## 2020-10-13 NOTE — PROGRESS NOTES
Hand Therapy Initial Evaluation    Current Date:  10/13/2020    Diagnosis: Right elbow pain  DOI: 8/15/2020  Referring physician: Eron Onofre MD      Subjective:  Lashonda Rodriguez is a 40 year old female.    Patient reports pain of the right elbow which occurred due to using TRX bands at the gym. Patient also notes she wakes with numbness in thumb, index, and long fingers occasionally. A Since onset symptoms are Gradually getting better.  General health as reported by patient is good.  Pertinent medical history includes:None  Medical allergies:none.  Surgical history: none.  Medication history: Anti-depressants, Anti-inflammatory.    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    Previous treatment: None  Barriers include:none  Mobility: No difficulty  Transportation: drives  Currently working in normal job without restrictions  Leisure activities/hobbies: Working out, [employer]  Other: Posterior elbow pain with locking in hyperextended position, posterior elbow pain with lifting in supinated position, occasional numbness of thumb index and long fingers (within SRN distribution)    Functional Outcome Measure:   Upper Extremity Functional Index Score:  SCORE: 75/80   (A lower score indicates greater disability.)      Objective:  Pain Level (Scale 0-10)   10/13/2020   At Rest 1/10   With Use Up to 3-4/10     Pain Description  Date 10/13/2020   Location Posterior elbow   Pain Quality Sharp   Frequency intermittent     Pain is worst  daytime   Exacerbated by Reaching (terminal elbow extension), lifting in a supinated position   Relieved by None   Progression Gradually improving     Posture  Normal    Sensation  WNL  throughout all nerve distributions; per patient report. However, patient reports occasional tingling/numbness within the superficial radial nerve distribution.    ROM  Pain Report: - none  + mild    ++ moderate    +++ severe   Elbow 10/13/2020 10/13/2020   AROM (PROM) Left Right   Extension HE HE+   Flexion 145 145   Supination 85 85   Pronation 85 85       Resisted Testing  Pain Report:  - none    + mild    ++ moderate    +++ severe    10/13/2020   Elbow Extension -   Elbow Flexion Neutral: -  Supinated: +   Supination  ++  PIN   Pronation -   Wrist Ext with RD, Elbow Ext -   EDC with Elbow Ext -   Long Finger Test +     Neural Tension Testing  RNT: Radial Neurodynamic Test (based on ROB Ely's ULNT)   10/13/2020   0-5 Scale 5/5  Tension at distal aspect   Position:   0/5: Arm across abdomen in coronal plane  1/5: Depress shoulder, ER to neutral ABD shoulder to 45 degrees  2/5: IR shoulder to end range, keep elbow at 90 degrees  3/5: Extend elbow to 0 degrees  4/5: Fully pronate forearm  5/5: Flex wrist and fingers with UD  Notes:    (+) indicates beyond grade level but less than correction to next level    (-) indicates over correction to level    S1  onset/change of patient's symptoms    S2 definite stop point based on patient's discomfort level    Strength   (Measured in pounds)  Pain Report: - none  + mild    ++ moderate    +++ severe    10/13/2020 10/13/2020   Trials Left Right   1 75.1 81.1       10/13/2020 10/13/2020    Left Right   Elbow Ext 71.1 64.7     Palpation  Pain Report:  - none    + mild    ++ moderate    +++ severe    10/13/2020   Proximal Triceps -   Spiral Groove +   Distal Triceps +   ECRB at LEP -   ECU at LEP -   EDC at LEP -   Extensor Wad -   PIN Site +     Assessment:  Patient presents with symptoms consistent with diagnosis of the above condition, with conservative intervention.     Patient's limitations or Problem List includes:  Pain and Sensory disturbance of the right elbow and  hand which interferes with the patient's ability to perform Work Tasks and Recreational Activities as compared to previous level of function.    Rehab Potential:  Excellent - Return to full activity, no limitations    Patient will benefit from skilled Occupational Therapy to increase flexibility and sensation and decrease pain to return to previous activity level and resume normal daily tasks and to reach their rehab potential.    Barriers to Learning:  No barrier    Communication Issues:  Patient appears to be able to clearly communicate and understand verbal and written communication and follow directions correctly.    Chart Review: Chart Review    Identified Performance Deficits: work and leisure activities    Assessment of Occupational Performance:  1-3 Performance Deficits    Clinical Decision Making (Complexity): Low complexity    Treatment Explanation:  The following has been discussed with the patient:  RX ordered/plan of care  Anticipated outcomes  Possible risks and side effects    Plan:  Frequency:  1 X week, once daily  Duration:  for 4 weeks    Treatment Plan:    Modalities:    US   Therapeutic Exercise:    AROM, PROM, Isotonics and Isometrics  Neuromuscular re-ed:   Nerve Gliding and Kinesiotaping  Manual Techniques:   Myofascial release  Orthotic Fabrication:    Static orthosis, wrist cock-up if needed  Self Care:    Ergonomic Considerations    Discharge Plan:  Achieve all LTG.  Independent in home treatment program.  Reach maximal therapeutic benefit.    Home Program:   Warmth for stiffness  Ice after activity for pain  PROM with stretch to wrist extensors  MFR to extensors, avoiding PIN site  Radial nerve gliding      Next Visit:  MFR  Nerve gliding

## 2020-10-15 LAB
FINAL DIAGNOSIS: NORMAL
HPV HR 12 DNA CVX QL NAA+PROBE: NEGATIVE
HPV16 DNA SPEC QL NAA+PROBE: NEGATIVE
HPV18 DNA SPEC QL NAA+PROBE: NEGATIVE
SPECIMEN DESCRIPTION: NORMAL
SPECIMEN SOURCE CVX/VAG CYTO: NORMAL

## 2020-10-16 ENCOUNTER — MYC MEDICAL ADVICE (OUTPATIENT)
Dept: PSYCHIATRY | Facility: CLINIC | Age: 40
End: 2020-10-16

## 2020-10-16 DIAGNOSIS — G47.419 PRIMARY NARCOLEPSY WITHOUT CATAPLEXY: ICD-10-CM

## 2020-10-16 RX ORDER — DEXTROAMPHETAMINE SACCHARATE, AMPHETAMINE ASPARTATE, DEXTROAMPHETAMINE SULFATE AND AMPHETAMINE SULFATE 5; 5; 5; 5 MG/1; MG/1; MG/1; MG/1
TABLET ORAL
Qty: 90 TABLET | Refills: 0 | Status: SHIPPED | OUTPATIENT
Start: 2020-10-16 | End: 2020-11-11

## 2020-10-16 NOTE — TELEPHONE ENCOUNTER
Last seen: 10/5  RTC: 6 months   Cancel: none   No-show: none   Next appt: 11/11    Incoming refill from patient via [employer]    Medication requested: amphetamine-dextroamphetamine (ADDERALL) 20 MG tablet  Directions: Take 2 tabs in the morning, 1 tab at noon  Qty: 90  Last refilled: 9/14 #90, 8/19 #90, 7/20 #90    Will route to provider for approval

## 2020-11-11 ENCOUNTER — VIRTUAL VISIT (OUTPATIENT)
Dept: PSYCHIATRY | Facility: CLINIC | Age: 40
End: 2020-11-11
Attending: NURSE PRACTITIONER
Payer: COMMERCIAL

## 2020-11-11 ENCOUNTER — TELEPHONE (OUTPATIENT)
Dept: PSYCHIATRY | Facility: CLINIC | Age: 40
End: 2020-11-11

## 2020-11-11 DIAGNOSIS — F33.42 MAJOR DEPRESSIVE DISORDER, RECURRENT EPISODE, IN FULL REMISSION (H): ICD-10-CM

## 2020-11-11 DIAGNOSIS — G47.419 PRIMARY NARCOLEPSY WITHOUT CATAPLEXY: ICD-10-CM

## 2020-11-11 PROCEDURE — 99213 OFFICE O/P EST LOW 20 MIN: CPT | Mod: 95 | Performed by: NURSE PRACTITIONER

## 2020-11-11 RX ORDER — DEXTROAMPHETAMINE SACCHARATE, AMPHETAMINE ASPARTATE, DEXTROAMPHETAMINE SULFATE AND AMPHETAMINE SULFATE 5; 5; 5; 5 MG/1; MG/1; MG/1; MG/1
TABLET ORAL
Qty: 90 TABLET | Refills: 0 | Status: SHIPPED | OUTPATIENT
Start: 2020-11-11 | End: 2021-02-05

## 2020-11-11 RX ORDER — LAMOTRIGINE 150 MG/1
150 TABLET ORAL DAILY
Qty: 90 TABLET | Refills: 0 | Status: SHIPPED | OUTPATIENT
Start: 2020-11-11 | End: 2021-05-10

## 2020-11-11 NOTE — TELEPHONE ENCOUNTER
On 11/11/2020, at 1436, writer called patient at mobile to confirm Virtual Visit. Writer unable to make contact with patient. Writer left detailed voice message for callback. [phone], left as call back number. EVIN Schneider, EMT

## 2020-11-11 NOTE — PROGRESS NOTES
Video- Visit Details  Type of service:  video visit for medication management  Time of service:    Date:  11/11/2020    Video Start Time: 3:10p     Video End Time: 3:28p    Reason for video visit:  Patient has requested telehealth visit  Originating Site (patient location):  [facility]   Location- Patient's home  Distant Site (provider location):  Remote location  Mode of Communication:  Video Conference via [employer]  Consent:  Patient has given verbal consent for video visit?: Yes            [facility]  Psychiatry Clinic  PSYCHIATRIC PROGRESS NOTE  telemedicine       Lashonda Rodriguez is a 40 year old female who prefers the name Josi and pronouns she, her, hers, herself.  Therapist: couples counseling as needed  PCP: Selma Alba  Other Providers: None     PREVIOUS PSYCH MED TRIALS:  - Provigil (limited effect, 2-3 month trial)  - Nuvigil 250mg (trialed in 2013-ineffective)  - buspar (unknown)  - Wellbutrin XL (taken with Adderall, two seizures in 2009/2010)  - AMT 10mg for skin picking (unknown)  - NAC (ineffective, unsure about her compliance)     Pertinent Background:  See previous notes.  Psych critical item history includes [no critical items].      Interim History                                                                                                        4, 4      The patient is a fair historian, reports good treatment adherence and was last seen 04/06/2020 when she chose to continue lamotrigine 150mg daily with Adderall 40mg QAM, 20mg QNoon.      Since the last visit, she's been OK.  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, she plays drums    Recent Symptoms:   Depression: insignificant symptoms  Anxiety: some anxiety re: her daughter's decisions, her change in boss next week   - skin picking increases with stress     ADVERSE EFFECTS: none  MEDICAL CONCERNS: none today     APPETITE: OK, reports weight is stable; eats vegan (focuses on protein shakes, PB, leafy greens)    SLEEP: with melatonin 10mg, sleeping 6-7 hours nightly     Recent Substance Use:  Alcohol- limits, prefers craft beer  Caffeine- takes No Doze with Adderall for narcolepsy     Per 9/14/2016 note: history of alcohol, cannabis, meth abuse, treatment about 16yo in 1997 for cannabis        Social/ Family History                                  [per patient report]                                 1ea,1ea      FINANCIAL SUPPORT- working  CHILDREN- two daughters (b. 2003, b. 2002)       LIVING SITUATION- lives with  Tono (m. 2015) and two daughters      LEGAL- None  EARLY HISTORY/ EDUCATION- [place] in [place] with half siblings. BS in Biology from [employer]  SOCIAL/ SPIRITUAL SUPPORT- support from family, friends       CULTURAL INFLUENCES/ IMPACT- UNKNOWN       TRAUMA HISTORY- None  FEELS SAFE AT HOME- Yes  FAMILY HISTORY-  UNKNOWN    Medical / Surgical History                                 Patient Active Problem List   Diagnosis     Major depressive disorder, recurrent episode (H)     Narcolepsy     Bladder spasms     Exercise-induced asthma     CARDIOVASCULAR SCREENING; LDL GOAL LESS THAN 160     Impulse control disorder     Attention deficit disorder     Hx of psychiatric care     Melasma     Right elbow pain     Numbness and tingling in right hand       Past Surgical History:   Procedure Laterality Date     ECTOPIC PREGNANCY SURGERY       GYN SURGERY  ectopic pregnancy     HC TOOTH EXTRACTION W/FORCEP        Medical Review of Systems         [2,10]     Pregnant- No    Breastfeeding- No    Contraception- IUD  A comprehensive review of systems was  performed and is negative other than noted in the HPI.  Denies head trauma, LOC.      While taking Wellbutrin, she reports history of general tonic clonic seizure 9/20/2009 without aura with postictal confusion; second seizure while with SO on 1/23/10 (tonic clonic; foaming at the mouth with post ictal confusion and lethargy. Narcolepsy diagnosed about 2004. Remote history of febrile convulsion before [age] yo, her daughter has a history of febrile convulsions.     Allergy    Wellbutrin [bupropion] and Penicillins     Wellbutrin- two seizures while taking Wellbutrin and Adderall    Current Medications        Current Outpatient Medications   Medication Sig Dispense Refill     acetaminophen (TYLENOL) 500 MG tablet take 1-2 Tabs by mouth every 6 hours as needed.       amphetamine-dextroamphetamine (ADDERALL) 20 MG tablet Take 2 tabs in the morning, 1 tab at noon. 90 tablet 0     caffeine (NO-DOZE) 200 MG TABS Take 1 tablet (200 mg) by mouth 2 times daily (Patient taking differently: Take 200 mg by mouth 2 times daily 400 mg qam and 200 qnoon) 150 tablet      cetirizine (ZYRTEC ALLERGY) 10 MG tablet Take 10 mg by mouth daily       Cyanocobalamin (VITAMIN B 12 PO)        fluocin-hydroquinone-tretinoin 0.01-4-0.05 % CREA Twice daily To the face 30 g 2     hydroquinone 4 % CREA Externally apply topically daily In the morning 30 g 2     Ibuprofen 200 MG capsule take 200-400 mg by mouth every 6 hours as needed.       lamoTRIgine (LAMICTAL) 150 MG tablet Take 1 tablet (150 mg) by mouth daily 90 tablet 1     melatonin 5 MG tablet Take 10 mg by mouth nightly as needed        Multiple Vitamins-Minerals (MULTIVITAMIN ADULT PO)        Vitals         [3, 3]   There were no vitals taken for this visit.   Mental Status Exam        [9, 14 cog gs]     Alertness: alert  and oriented  Appearance: N/A  Behavior/Demeanor: cooperative, pleasant and calm, with N/A eye contact   Speech: normal and regular rate and rhythm  Language: no  problems  Psychomotor: N/A  Mood: description consistent with euthymia  Affect: appropriate; was congruent to mood; was congruent to content  Thought Process/Associations: unremarkable  Thought Content:  Reports none;  Denies suicidal ideation, violent ideation, delusions, preoccupations, obsessions , phobia  and magical thinking  Perception:  Reports none;  Denies auditory hallucinations, visual hallucinations, visual distortion seen as shadows , depersonalization and derealization  Insight: fair  Judgment: good  Cognition: (6) does  appear grossly intact; formal cognitive testing was not done  Gait/Station and/or Muscle Strength/Tone: N/A    Labs and Data                          Rating Scales:    N/A    PHQ9 Today:    PHQ 10/4/2018 4/4/2019 10/7/2019   PHQ-9 Total Score 1 0 0   Q9: Thoughts of better off dead/self-harm past 2 weeks Not at all Not at all Not at all     Diagnosis      primary narcolepsy 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 (diagnosed by sleep study in 2007)  recurrent MDD in remission     Assessment      [m2, h3]     Today the following issues were addressed:      : 11/2020- Adderall 20mg #90 last filled by writer on 10/16/2020     PSYCHOTROPIC DRUG INTERACTIONS:  - LAMOTRIGINE, TYLENOL (may result in decreased lamotrigine effectiveness)     Drug Interaction Management: Monitoring for adverse effects, routine vitals, using lowest therapeutic dose of [psychotropics] and patient is aware of risks    Plan                                                                                                                     m2, h3     1) she chooses to continue lamotrigine 150mg daily, Adderall 40mg QAM, 20mg Qnoon (three months sent today; she's using new pharmacy called [facility], may not stay with them, will let us know her decision in 3 months when she calls for RFs)     - she'll contact office for  and refill of Adderall in 3 months  - takes melatonin 10mg at bedtime PRN  - takes No Doze 200mg BID for  narcolepsy     2) active in couples counseling      RTC: 6 months, sooner as needed    CRISIS NUMBERS:   Provided routinely in AVS.    Treatment Risk Statement:  The patient understands the risks, benefits, adverse effects and alternatives. Agrees to treatment with the capacity to do so. No medical contraindications to treatment. Agrees to call clinic for any problems. The patient understands to call 911 or go to the nearest ED if life threatening or urgent symptoms occur.     WHODAS 2.0  TODAY total score = N/A; [a 12-item WHODAS 2.0 assessment was not completed by the pt today and/or recorded in EPIC].    PROVIDER:  AYSE Dumont CNP

## 2021-02-04 ENCOUNTER — MYC MEDICAL ADVICE (OUTPATIENT)
Dept: PSYCHIATRY | Facility: CLINIC | Age: 41
End: 2021-02-04

## 2021-02-04 DIAGNOSIS — G47.419 PRIMARY NARCOLEPSY WITHOUT CATAPLEXY: ICD-10-CM

## 2021-02-05 RX ORDER — DEXTROAMPHETAMINE SACCHARATE, AMPHETAMINE ASPARTATE, DEXTROAMPHETAMINE SULFATE AND AMPHETAMINE SULFATE 5; 5; 5; 5 MG/1; MG/1; MG/1; MG/1
TABLET ORAL
Qty: 90 TABLET | Refills: 0 | Status: SHIPPED | OUTPATIENT
Start: 2021-04-02 | End: 2021-05-10

## 2021-02-05 RX ORDER — DEXTROAMPHETAMINE SACCHARATE, AMPHETAMINE ASPARTATE, DEXTROAMPHETAMINE SULFATE AND AMPHETAMINE SULFATE 5; 5; 5; 5 MG/1; MG/1; MG/1; MG/1
TABLET ORAL
Qty: 90 TABLET | Refills: 0 | Status: SHIPPED | OUTPATIENT
Start: 2021-02-05 | End: 2021-05-10

## 2021-02-05 RX ORDER — DEXTROAMPHETAMINE SACCHARATE, AMPHETAMINE ASPARTATE, DEXTROAMPHETAMINE SULFATE AND AMPHETAMINE SULFATE 5; 5; 5; 5 MG/1; MG/1; MG/1; MG/1
TABLET ORAL
Qty: 90 TABLET | Refills: 0 | Status: SHIPPED | OUTPATIENT
Start: 2021-03-05 | End: 2021-05-10

## 2021-02-05 NOTE — TELEPHONE ENCOUNTER
- Meds refilled by provider   - Med tab changed to reflect this   - Patient notified via "[employer]t

## 2021-02-05 NOTE — TELEPHONE ENCOUNTER
Last seen: 11/11  RTC: 6 months   Cancel: none   No-show: none   Next appt: 5/10    Incoming refill from patient via [address] [employer]     Medication requested: amphetamine-dextroamphetamine (ADDERALL) 20 MG tablet  Directions:  Take two (2) tabs in the morning and one (1) tab at noon  Qty: 90  Last refilled: 1/8 #90, 12/11 #90, 11/13 #90    Medication refill approved per refill protocol

## 2021-02-07 ENCOUNTER — MYC MEDICAL ADVICE (OUTPATIENT)
Dept: PSYCHIATRY | Facility: CLINIC | Age: 41
End: 2021-02-07

## 2021-02-08 ENCOUNTER — TELEPHONE (OUTPATIENT)
Dept: PSYCHIATRY | Facility: CLINIC | Age: 41
End: 2021-02-08

## 2021-02-08 NOTE — TELEPHONE ENCOUNTER
Prior Authorization Retail Medication Request  URGENT REVIEW RENEWAL     Medication/Dose: amphetamine-dextroamphetamine (ADDERALL) 20 MG tablet- Take 2 tabs in the morning, 1 tab at noon  ICD code (if different than what is on RX):  Primary narcolepsy 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 [G47.419]   Previously Tried and Failed:   - Provigil (limited effect, 2-3 month trial)  - Nuvigil 250mg (trialed in 2013-ineffective)  - buspar (unknown)  - Wellbutrin XL (taken with Adderall, two seizures in 2009/2010)  - AMT 10mg for skin picking (unknown)  - NAC (ineffective, unsure about her compliance)     Rationale: Please see approved PA from 1/20/20. Patient has a long standing history of Narcolepsy 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 diagnosed by a sleep study in 2007. She is prescribed Adderall and caffeine tabs to treat narcolepsy since the diagnosis. Lashonda reports stable mood without skin picking behaviors. she denies side effects of medication from the medications. The use of Adderall (amphetamine salts) is indicated and appropriate. This regimen is both beneficial and well tolerated with no adverse effects or tolerance. There is no evidence of abuse of this medication or other substances. Please If Lashonda is unable to continue at her current dose of Adderall, she will experience worsening of narcolepsy symptoms as she has in the past. The result of which can negatively impact her social and family relationships, job performance, self-esteem and ability to safety operate a motor vehicle. We are requesting an expedited review in matter to avoid any futher interruption in Lashonda being able to refill this medication. Please feel free to contact out clinic with any questions.         Insurance Name:  Not provided   Insurance ID:  Not provided         Pharmacy Information (if different than what is on RX)  Name:  [facility]   Phone:  [facility]

## 2021-02-08 NOTE — TELEPHONE ENCOUNTER
Prior Authorization Approval    Authorization Effective Date:  2/8/21  Authorization Expiration Date:  2/8/22  Medication: amphetamine-dextroamphetamine (ADDERALL) 20 MG tablet  Approved Dose/Quantity:   Reference #:     Insurance Company: [employer] - Phone [phone] Fax [phone]  Expected CoPay:       CoPay Card Available:      Foundation Assistance Needed:    Which Pharmacy is filling the prescription (Not needed for infusion/clinic administered): [facility] -- [place] - [place], [place] - [address]  Pharmacy Notified: Yes-Pharmacy will fill at the time it can be.  Patient Notified: No

## 2021-04-17 ENCOUNTER — HEALTH MAINTENANCE LETTER (OUTPATIENT)
Age: 41
End: 2021-04-17

## 2021-05-09 DIAGNOSIS — G47.419 PRIMARY NARCOLEPSY WITHOUT CATAPLEXY: ICD-10-CM

## 2021-05-09 DIAGNOSIS — F33.42 MAJOR DEPRESSIVE DISORDER, RECURRENT EPISODE, IN FULL REMISSION (H): ICD-10-CM

## 2021-05-10 ENCOUNTER — VIRTUAL VISIT (OUTPATIENT)
Dept: PSYCHIATRY | Facility: CLINIC | Age: 41
End: 2021-05-10
Attending: NURSE PRACTITIONER
Payer: COMMERCIAL

## 2021-05-10 DIAGNOSIS — F33.42 MAJOR DEPRESSIVE DISORDER, RECURRENT EPISODE, IN FULL REMISSION (H): ICD-10-CM

## 2021-05-10 DIAGNOSIS — G47.419 PRIMARY NARCOLEPSY WITHOUT CATAPLEXY: ICD-10-CM

## 2021-05-10 PROCEDURE — 99213 OFFICE O/P EST LOW 20 MIN: CPT | Mod: TEL | Performed by: NURSE PRACTITIONER

## 2021-05-10 RX ORDER — LAMOTRIGINE 150 MG/1
150 TABLET ORAL DAILY
Qty: 90 TABLET | Refills: 0 | Status: SHIPPED | OUTPATIENT
Start: 2021-05-10 | End: 2021-08-19

## 2021-05-10 RX ORDER — DEXTROAMPHETAMINE SACCHARATE, AMPHETAMINE ASPARTATE, DEXTROAMPHETAMINE SULFATE AND AMPHETAMINE SULFATE 5; 5; 5; 5 MG/1; MG/1; MG/1; MG/1
TABLET ORAL
Qty: 90 TABLET | Refills: 0 | Status: SHIPPED | OUTPATIENT
Start: 2021-05-10 | End: 2021-07-30

## 2021-05-10 RX ORDER — DEXTROAMPHETAMINE SACCHARATE, AMPHETAMINE ASPARTATE, DEXTROAMPHETAMINE SULFATE AND AMPHETAMINE SULFATE 5; 5; 5; 5 MG/1; MG/1; MG/1; MG/1
TABLET ORAL
Qty: 90 TABLET | Refills: 0 | Status: SHIPPED | OUTPATIENT
Start: 2021-05-10 | End: 2021-11-22

## 2021-05-10 ASSESSMENT — PAIN SCALES - GENERAL: PAINLEVEL: NO PAIN (0)

## 2021-05-10 NOTE — PATIENT INSTRUCTIONS
**For crisis resources, please see the information at the end of this document**     Patient Education      Thank you for coming to the [facility].    Lab Testing:  If you had lab testing today and your results are reassuring or normal they will be mailed to you or sent through [employer] within 7 days. If the lab tests need quick action we will call you with the results. The phone number we will call with results is # [phone] (home) . If this is not the best number please call our clinic and change the number.    Medication Refills:  If you need any refills please call your pharmacy and they will contact us. Our fax number for refills is [phone]. Please allow three business for refill processing. If you need to  your refill at a new pharmacy, please contact the new pharmacy directly. The new pharmacy will help you get your medications transferred.     Scheduling:  If you have any concerns about today's visit or wish to schedule another appointment please call our office during normal business hours [phone] (8-5:00 M-F)    Contact Us:  Please call [phone] during business hours (8-5:00 M-F).  If after clinic hours, or on the weekend, please call  [phone].    Financial Assistance [phone]  [facility] Billing [phone]  Central Billing Office, [facility]: [phone]  [place] Billing [phone]  Medical Records [phone]  [place] Patient Bill of Rights [email or web address]       MENTAL HEALTH CRISIS NUMBERS:  For a medical emergency please call  911 or go to the nearest ER.     [facility]:   [facility] -[phone]   Crisis Residence [facility] -[phone]   Walk-In Counseling Center [facility] -[phone]   COPE 24/7 [place] Mobile Team -[phone] (adults)/[phone] (child)  CHILD: [facility] needs assessment  team - [phone]      [facility]:   [facility] - [phone]   Walk-in counseling [facility] - [phone]   Walk-in counseling [facility] - [phone]   Crisis Residence [facility] - [phone]  Urgent Care Adult Mental Aggpkq-[phone] mobile unit/ 24/7 crisis line    National Crisis Numbers:   National Suicide Prevention Lifeline: 7-058-425-TALK (542-905-7949)  Poison Control Center - 0-612-254-8093  [employer])/resources for a list of additional resources (SOS)  Trans Lifeline a hotline for transgender people 1-688.208.6066  The Lb Project a hotline for LGBT youth 9-498-486-9014  Crisis Text Line: For any crisis 24/7   To: 828786  see www.crisistextline.org  - IF MAKING A CALL FEELS TOO HARD, send a text!         Again thank you for choosing [facility] and please let us know how we can best partner with you to improve you and your family's health.    You may be receiving a survey regarding this appointment. We would love to have your feedback, both positive and negative. The survey is done by an external company, so your answers are anonymous.

## 2021-05-10 NOTE — PROGRESS NOTES
"VIDEO VISIT  Lashonda Rodriguez is a 41 year old patient who is being evaluated via a billable video visit.      The patient has been notified of following:   \"This video visit will be conducted via a call between you and your physician/provider. We have found that certain health care needs can be provided without the need for an in-person physical exam. This service lets us provide the care you need with a video conversation. If a prescription is necessary we can send it directly to your pharmacy. If lab work is needed we can place an order for that and you can then stop by our lab to have the test done at a later time. Insurers are generally covering virtual visits as they would in-office visits so billing should not be different than normal.  If for some reason you do get billed incorrectly, you should contact the billing office to correct it and that number is in the AVS .    Video Conference to be completed via:  iGuiderswell    Patient has given verbal consent for video visit?:  Yes    Patient would prefer that any video invitations be sent by: Send to e-mail at: [email or web address]      How would patient like to obtain AVS?:  Site9    AVS SmartPhrase [PsychAVS] has been placed in 'Patient Instructions':  Yes     Video- Visit Details  Type of service:  video visit for medication management  Time of service:    Date:  05/10/2021    Video Start Time: 3:08p     Video End Time: 3:22p    Reason for video visit:  Patient has requested telehealth visit  Originating Site (patient location):  [facility]   Location- Patient's home  Distant Site (provider location):  Remote location  Mode of Communication:  Video Conference via AmWell  Consent:  Patient has given verbal consent for video visit?: Yes     Visit converted to phone after patient could not see or hear writer.       [facility]  Psychiatry Clinic  PSYCHIATRIC PROGRESS NOTE  telemedicine       Lashonda Rodriguez is a 41 year old " female who prefers the name Josi and pronouns she, her, hers, herself.  Therapist: couples counseling as needed  PCP: Selma Alba  Other Providers: None     PREVIOUS PSYCH MED TRIALS:  - Provigil (limited effect, 2-3 month trial)  - Nuvigil 250mg (trialed in 2013-ineffective)  - buspar (unknown)  - Wellbutrin XL (taken with Adderall, two seizures in 2009/2010)  - AMT 10mg for skin picking (unknown)  - NAC (ineffective, unsure about her compliance)     Pertinent Background:  See previous notes.  Psych critical item history includes [no critical items].      Interim History                                                                                                        4, 4      The patient is a fair historian, reports good treatment adherence and was last seen 11/11/2020 when she chose to continue lamotrigine 150mg daily with Adderall 40mg QAM, 20mg QNoon.      Since the last visit, she's been good.  - she's working in the office, she's a Operations   - enjoys her her boss  - finds she is challenged, good support and enjoys the variety of work tasks  - her daughter Leticia will started at [employer]  - her youngest daughter is starting at a [employer]  - her  is active in individual therapy  - getting to the gym 4x weekly  - since getting vaccinated, she's getting together with her band for practice, she plays drums    Recent Symptoms:   Depression: insignificant symptoms  Anxiety: recognizes influence of external stressors, her skin picking can increase with stress     ADVERSE EFFECTS: none  MEDICAL CONCERNS: none today     APPETITE: OK, reports weight is stable; eats vegan (focuses on protein shakes, PB, leafy greens)    SLEEP: with melatonin 10mg, sleeping 6-7 hours nightly     Recent Substance Use:  Alcohol- limits, prefers craft beer  Caffeine- takes No Doze with Adderall for narcolepsy     Per 9/14/2016 note: history of alcohol, cannabis, meth  abuse, treatment about 14yo in 1997 for cannabis        Social/ Family History                                  [per patient report]                                 1ea,1ea      FINANCIAL SUPPORT- working  CHILDREN- two daughters (b. 2003, b. 2002)       LIVING SITUATION- lives with  Tono (m. 2015) and two daughters      LEGAL- None  EARLY HISTORY/ EDUCATION- Grew up in [place] with half siblings. BS in Biology from [employer]  SOCIAL/ SPIRITUAL SUPPORT- support from family, friends       CULTURAL INFLUENCES/ IMPACT- UNKNOWN       TRAUMA HISTORY- None  FEELS SAFE AT HOME- Yes  FAMILY HISTORY-  UNKNOWN    Medical / Surgical History                                 Patient Active Problem List   Diagnosis     Major depressive disorder, recurrent episode (H)     Narcolepsy     Bladder spasms     Exercise-induced asthma     CARDIOVASCULAR SCREENING; LDL GOAL LESS THAN 160     Impulse control disorder     Attention deficit disorder     Hx of psychiatric care     Melasma     Right elbow pain     Numbness and tingling in right hand       Past Surgical History:   Procedure Laterality Date     ECTOPIC PREGNANCY SURGERY       GYN SURGERY  ectopic pregnancy     HC TOOTH EXTRACTION W/FORCEP        Medical Review of Systems         [2,10]     Pregnant- No    Breastfeeding- No    Contraception- IUD  A comprehensive review of systems was performed and is negative other than noted in the HPI.  Denies head trauma, LOC.      While taking Wellbutrin, she reports history of general tonic clonic seizure 9/20/2009 without aura with postictal confusion; second seizure while with SO on 1/23/10 (tonic clonic; foaming at the mouth with post ictal confusion and lethargy. Narcolepsy diagnosed about 2004. Remote history of febrile convulsion before [age] yo, her daughter has a history of febrile convulsions.     Allergy    Wellbutrin [bupropion] and Penicillins     Wellbutrin- two seizures while taking Wellbutrin and Adderall    Current  Medications        Current Outpatient Medications   Medication Sig Dispense Refill     acetaminophen (TYLENOL) 500 MG tablet take 1-2 Tabs by mouth every 6 hours as needed.       amphetamine-dextroamphetamine (ADDERALL) 20 MG tablet Take 2 tabs in the morning, 1 tab at noon. 90 tablet 0     amphetamine-dextroamphetamine (ADDERALL) 20 MG tablet Take two (2) tabs in the morning and one (1) tab at noon 90 tablet 0     amphetamine-dextroamphetamine (ADDERALL) 20 MG tablet Take two (2) tabs in the morning and one (1) tab at noon 90 tablet 0     caffeine (NO-DOZE) 200 MG TABS Take 1 tablet (200 mg) by mouth 2 times daily (Patient taking differently: Take 200 mg by mouth 2 times daily 400 mg qam and 200 qnoon) 150 tablet      cetirizine (ZYRTEC ALLERGY) 10 MG tablet Take 10 mg by mouth daily       Cyanocobalamin (VITAMIN B 12 PO)        fluocin-hydroquinone-tretinoin 0.01-4-0.05 % CREA Twice daily To the face 30 g 2     hydroquinone 4 % CREA Externally apply topically daily In the morning 30 g 2     Ibuprofen 200 MG capsule take 200-400 mg by mouth every 6 hours as needed.       lamoTRIgine (LAMICTAL) 150 MG tablet Take 1 tablet (150 mg) by mouth daily 90 tablet 0     melatonin 5 MG tablet Take 10 mg by mouth nightly as needed        Multiple Vitamins-Minerals (MULTIVITAMIN ADULT PO)        Vitals         [3, 3]   There were no vitals taken for this visit.   Mental Status Exam        [9, 14 cog gs]     Alertness: alert  and oriented  Appearance: N/A  Behavior/Demeanor: cooperative, pleasant and calm, with N/A eye contact   Speech: normal and regular rate and rhythm  Language: no problems  Psychomotor: N/A  Mood: description consistent with euthymia  Affect: appropriate; was congruent to mood; was congruent to content  Thought Process/Associations: unremarkable  Thought Content:  Reports none;  Denies suicidal ideation, violent ideation, delusions, preoccupations, obsessions , phobia  and magical thinking  Perception:   Reports none;  Denies auditory hallucinations, visual hallucinations, visual distortion seen as shadows , depersonalization and derealization  Insight: fair  Judgment: good  Cognition: (6) does  appear grossly intact; formal cognitive testing was not done  Gait/Station and/or Muscle Strength/Tone: N/A    Labs and Data                          Rating Scales:    N/A    PHQ9 Today:    PHQ 10/4/2018 4/4/2019 10/7/2019   PHQ-9 Total Score 1 0 0   Q9: Thoughts of better off dead/self-harm past 2 weeks Not at all Not at all Not at all     Diagnosis      primary narcolepsy 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 (diagnosed by sleep study in 2007)  recurrent MDD in remission     Assessment      [m2, h3]     Today the following issues were addressed:      : 05/2021- Adderall 20mg #90 last filled by writer on 4/13/2021     PSYCHOTROPIC DRUG INTERACTIONS:  - LAMOTRIGINE, TYLENOL (may result in decreased lamotrigine effectiveness)     Drug Interaction Management: Monitoring for adverse effects, routine vitals, using lowest therapeutic dose of [psychotropics] and patient is aware of risks    Plan                                                                                                                     m2, h3     1) she chooses to continue lamotrigine 150mg daily, Adderall 40mg QAM, 20mg Qnoon      - she'll contact office for  and refill of Adderall for 3 months  - takes melatonin 10mg at bedtime PRN  - takes No Doze 200mg BID for narcolepsy     2) active in couples counseling      RTC: 6 months, sooner as needed    CRISIS NUMBERS:   Provided routinely in AVS.    Treatment Risk Statement:  The patient understands the risks, benefits, adverse effects and alternatives. Agrees to treatment with the capacity to do so. No medical contraindications to treatment. Agrees to call clinic for any problems. The patient understands to call 911 or go to the nearest ED if life threatening or urgent symptoms occur.     WHODAS 2.0  TODAY total score =  N/A; [a 12-item WHODAS 2.0 assessment was not completed by the pt today and/or recorded in EPIC].    PROVIDER:  AYSE Dumont CNP

## 2021-05-11 RX ORDER — DEXTROAMPHETAMINE SACCHARATE, AMPHETAMINE ASPARTATE, DEXTROAMPHETAMINE SULFATE AND AMPHETAMINE SULFATE 5; 5; 5; 5 MG/1; MG/1; MG/1; MG/1
TABLET ORAL
Qty: 90 TABLET | Refills: 0 | OUTPATIENT
Start: 2021-05-11

## 2021-05-11 RX ORDER — LAMOTRIGINE 150 MG/1
TABLET ORAL
Qty: 90 TABLET | Refills: 0 | OUTPATIENT
Start: 2021-05-11

## 2021-06-01 PROBLEM — R20.0 NUMBNESS AND TINGLING IN RIGHT HAND: Status: RESOLVED | Noted: 2020-10-13 | Resolved: 2021-06-01

## 2021-06-01 PROBLEM — M25.521 RIGHT ELBOW PAIN: Status: RESOLVED | Noted: 2020-10-13 | Resolved: 2021-06-01

## 2021-06-01 PROBLEM — R20.2 NUMBNESS AND TINGLING IN RIGHT HAND: Status: RESOLVED | Noted: 2020-10-13 | Resolved: 2021-06-01

## 2021-07-30 ENCOUNTER — ANCILLARY PROCEDURE (OUTPATIENT)
Dept: GENERAL RADIOLOGY | Facility: CLINIC | Age: 41
End: 2021-07-30
Attending: FAMILY MEDICINE
Payer: COMMERCIAL

## 2021-07-30 ENCOUNTER — OFFICE VISIT (OUTPATIENT)
Dept: ORTHOPEDICS | Facility: CLINIC | Age: 41
End: 2021-07-30
Payer: COMMERCIAL

## 2021-07-30 ENCOUNTER — MYC REFILL (OUTPATIENT)
Dept: PSYCHIATRY | Facility: CLINIC | Age: 41
End: 2021-07-30

## 2021-07-30 VITALS — SYSTOLIC BLOOD PRESSURE: 107 MMHG | DIASTOLIC BLOOD PRESSURE: 68 MMHG

## 2021-07-30 DIAGNOSIS — G89.29 CHRONIC BILATERAL LOW BACK PAIN WITHOUT SCIATICA: Primary | ICD-10-CM

## 2021-07-30 DIAGNOSIS — G89.29 CHRONIC BILATERAL LOW BACK PAIN WITHOUT SCIATICA: ICD-10-CM

## 2021-07-30 DIAGNOSIS — M54.50 CHRONIC BILATERAL LOW BACK PAIN WITHOUT SCIATICA: ICD-10-CM

## 2021-07-30 DIAGNOSIS — G47.419 PRIMARY NARCOLEPSY WITHOUT CATAPLEXY: ICD-10-CM

## 2021-07-30 DIAGNOSIS — M54.50 CHRONIC BILATERAL LOW BACK PAIN WITHOUT SCIATICA: Primary | ICD-10-CM

## 2021-07-30 PROCEDURE — 99204 OFFICE O/P NEW MOD 45 MIN: CPT | Performed by: FAMILY MEDICINE

## 2021-07-30 PROCEDURE — 72100 X-RAY EXAM L-S SPINE 2/3 VWS: CPT | Mod: GC | Performed by: RADIOLOGY

## 2021-07-30 NOTE — PROGRESS NOTES
CHIEF COMPLAINT:  Pain of the Lower Back       HISTORY OF PRESENT ILLNESS  Ms. Rodriguez is a pleasant 41 year old year old female who presents to clinic subacute low back pain.  Patient states that she began experiencing pain around April.  She denies any acute inciting event or trauma.    She notes initially she did have some radicular pain down both posterior thighs but this is subsided and is no longer occurring.  She notes the pain localizes to the bilateral low back near the lumbosacral area.  Worse with running, certain exercise at the gym, sitting for prolonged periods.    She not had any history of back pain prior to this.  No weakness.  No numbness or tingling currently.    Additional history: as documented    Review of Systems: A 10-point review of systems was obtained and is negative except for as noted in the HPI.       MEDICAL HISTORY  Patient Active Problem List   Diagnosis     Major depressive disorder, recurrent episode (H)     Narcolepsy     Bladder spasms     Exercise-induced asthma     CARDIOVASCULAR SCREENING; LDL GOAL LESS THAN 160     Impulse control disorder     Attention deficit disorder     Hx of psychiatric care     Melasma       Current Outpatient Medications   Medication Sig Dispense Refill     acetaminophen (TYLENOL) 500 MG tablet take 1-2 Tabs by mouth every 6 hours as needed.       amphetamine-dextroamphetamine (ADDERALL) 20 MG tablet Take 2 tabs in the morning, 1 tab at noon. 90 tablet 0     amphetamine-dextroamphetamine (ADDERALL) 20 MG tablet Take two (2) tabs in the morning and one (1) tab at noon 90 tablet 0     amphetamine-dextroamphetamine (ADDERALL) 20 MG tablet Take two (2) tabs in the morning and one (1) tab at noon 90 tablet 0     caffeine (NO-DOZE) 200 MG TABS Take 1 tablet (200 mg) by mouth 2 times daily (Patient taking differently: Take 200 mg by mouth 2 times daily 400 mg qam and 200 qnoon) 150 tablet      cetirizine (ZYRTEC ALLERGY) 10 MG tablet Take 10 mg by mouth  daily as needed        fluocin-hydroquinone-tretinoin 0.01-4-0.05 % CREA Twice daily To the face 30 g 2     Ibuprofen 200 MG capsule take 200-400 mg by mouth every 6 hours as needed.       lamoTRIgine (LAMICTAL) 150 MG tablet Take 1 tablet (150 mg) by mouth daily 90 tablet 0     melatonin 5 MG tablet Take 10 mg by mouth nightly as needed        Multiple Vitamins-Minerals (MULTIVITAMIN ADULT PO) Take by mouth daily        Cyanocobalamin (VITAMIN B 12 PO)  (Patient not taking: Reported on 7/30/2021)       hydroquinone 4 % CREA Externally apply topically daily In the morning (Patient not taking: Reported on 7/30/2021) 30 g 2       Allergies   Allergen Reactions     Wellbutrin [Bupropion] Other (See Comments)     Two seizures while on Adderall and Wellbutrin     Pcn [Penicillins] Rash       Family History   Problem Relation Age of Onset     Thyroid Disease Cousin      Melanoma No family hx of      Skin Cancer No family hx of        Additional medical/Social/Surgical histories reviewed in EPIC and updated as appropriate.       PHYSICAL EXAM  /68 (BP Location: Left arm, Patient Position: Sitting, Cuff Size: Adult Regular)     General  - normal appearance, in no obvious distress  HEENT  - conjunctivae not injected, moist mucous membranes  CV  - normal peripheral perfusion  Pulm  - normal respiratory pattern, non-labored  Musculoskeletal - lumbar spine  - stance: slow to rise and sit  - inspection: normal bone and joint alignment, no obvious scoliosis  - palpation: bilateral lumbar paravertebral spasm  - ROM: pain with bilateral rotation, flexion past 30 deg, extension  - strength: lower extremities 5/5 in all planes  - special tests:  (-) straight leg raise bilaterally  (-) slump test  Neuro  - patellar and Achilles DTRs 2+ bilaterally, no sensory or motor deficit, grossly normal coordination, normal muscle tone  Skin  - no ecchymosis, erythema, warmth, or induration, no obvious rash  Psych  - interactive,  appropriate, normal mood and affect    IMAGING : X-ray lumbar 2 view. Final results and radiologist's interpretation, available in the [facility] health record. Images were reviewed with the patient/family members in the office today. My personal interpretation of the performed imaging is minimally displaced loss at L5-S1 as well as lumbar facet arthropathy.     ASSESSMENT & PLAN  Ms. Rodriguez is a 41 year old year old female who presents to clinic today with subacute low back pain without current radiculitis.    Treatment options discussed at this time would like to start her in formal physical therapy.  She would like to avoid medications if possible I agree that this would be appropriate.  She will work to improve her lumbar pain-free range of motion, strength and perhaps consider an extension based lumbar program given initial history of radicular symptoms which may suggest disc pathology.    Continue with using heat for exacerbations.  Counseled regarding ibuprofen use as needed.    She will see me back in 4 weeks to 6 weeks and if she has pain that persists, may consider lumbar MRI at that time to further investigate whether facet injections or disc pathology is playing a greater role.     She is also counseled regarding activity modifications, especially in the gym.    It was a pleasure seeing Lashonda today.    Christ Akhtar DO, [facility]  Primary Care Sports Medicine    45 minutes on date of the encounter doing chart review, history and examination, independent imaging review, documentation, and additional activities noted above.

## 2021-07-30 NOTE — LETTER
7/30/2021         RE: Lashonda Rodriguez  [address]  [facility] [place] [facility] [address]        Dear Colleague,    Thank you for referring your patient, Lashonda Rodriguez, to the [facility] [place]. Please see a copy of my visit note below.    CHIEF COMPLAINT:  Pain of the Lower Back       HISTORY OF PRESENT ILLNESS  Ms. Rodriguez is a pleasant 41 year old year old female who presents to clinic subacute low back pain.  Patient states that she began experiencing pain around April.  She denies any acute inciting event or trauma.    She notes initially she did have some radicular pain down both posterior thighs but this is subsided and is no longer occurring.  She notes the pain localizes to the bilateral low back near the lumbosacral area.  Worse with running, certain exercise at the gym, sitting for prolonged periods.    She not had any history of back pain prior to this.  No weakness.  No numbness or tingling currently.    Additional history: as documented    Review of Systems: A 10-point review of systems was obtained and is negative except for as noted in the HPI.       MEDICAL HISTORY  Patient Active Problem List   Diagnosis     Major depressive disorder, recurrent episode (H)     Narcolepsy     Bladder spasms     Exercise-induced asthma     CARDIOVASCULAR SCREENING; LDL GOAL LESS THAN 160     Impulse control disorder     Attention deficit disorder     Hx of psychiatric care     Melasma       Current Outpatient Medications   Medication Sig Dispense Refill     acetaminophen (TYLENOL) 500 MG tablet take 1-2 Tabs by mouth every 6 hours as needed.       amphetamine-dextroamphetamine (ADDERALL) 20 MG tablet Take 2 tabs in the morning, 1 tab at noon. 90 tablet 0     amphetamine-dextroamphetamine (ADDERALL) 20 MG tablet Take two (2) tabs in the morning and one (1) tab at noon 90 tablet 0     amphetamine-dextroamphetamine (ADDERALL) 20 MG tablet Take two (2) tabs in the  morning and one (1) tab at noon 90 tablet 0     caffeine (NO-DOZE) 200 MG TABS Take 1 tablet (200 mg) by mouth 2 times daily (Patient taking differently: Take 200 mg by mouth 2 times daily 400 mg qam and 200 qnoon) 150 tablet      cetirizine (ZYRTEC ALLERGY) 10 MG tablet Take 10 mg by mouth daily as needed        fluocin-hydroquinone-tretinoin 0.01-4-0.05 % CREA Twice daily To the face 30 g 2     Ibuprofen 200 MG capsule take 200-400 mg by mouth every 6 hours as needed.       lamoTRIgine (LAMICTAL) 150 MG tablet Take 1 tablet (150 mg) by mouth daily 90 tablet 0     melatonin 5 MG tablet Take 10 mg by mouth nightly as needed        Multiple Vitamins-Minerals (MULTIVITAMIN ADULT PO) Take by mouth daily        Cyanocobalamin (VITAMIN B 12 PO)  (Patient not taking: Reported on 7/30/2021)       hydroquinone 4 % CREA Externally apply topically daily In the morning (Patient not taking: Reported on 7/30/2021) 30 g 2       Allergies   Allergen Reactions     Wellbutrin [Bupropion] Other (See Comments)     Two seizures while on Adderall and Wellbutrin     Pcn [Penicillins] Rash       Family History   Problem Relation Age of Onset     Thyroid Disease Cousin      Melanoma No family hx of      Skin Cancer No family hx of        Additional medical/Social/Surgical histories reviewed in EPIC and updated as appropriate.       PHYSICAL EXAM  /68 (BP Location: Left arm, Patient Position: Sitting, Cuff Size: Adult Regular)     General  - normal appearance, in no obvious distress  HEENT  - conjunctivae not injected, moist mucous membranes  CV  - normal peripheral perfusion  Pulm  - normal respiratory pattern, non-labored  Musculoskeletal - lumbar spine  - stance: slow to rise and sit  - inspection: normal bone and joint alignment, no obvious scoliosis  - palpation: bilateral lumbar paravertebral spasm  - ROM: pain with bilateral rotation, flexion past 30 deg, extension  - strength: lower extremities 5/5 in all planes  - special  tests:  (-) straight leg raise bilaterally  (-) slump test  Neuro  - patellar and Achilles DTRs 2+ bilaterally, no sensory or motor deficit, grossly normal coordination, normal muscle tone  Skin  - no ecchymosis, erythema, warmth, or induration, no obvious rash  Psych  - interactive, appropriate, normal mood and affect    IMAGING : X-ray lumbar 2 view. Final results and radiologist's interpretation, available in the [facility] health record. Images were reviewed with the patient/family members in the office today. My personal interpretation of the performed imaging is minimally displaced loss at L5-S1 as well as lumbar facet arthropathy.     ASSESSMENT & PLAN  Ms. Rodriguez is a 41 year old year old female who presents to clinic today with subacute low back pain without current radiculitis.    Treatment options discussed at this time would like to start her in formal physical therapy.  She would like to avoid medications if possible I agree that this would be appropriate.  She will work to improve her lumbar pain-free range of motion, strength and perhaps consider an extension based lumbar program given initial history of radicular symptoms which may suggest disc pathology.    Continue with using heat for exacerbations.  Counseled regarding ibuprofen use as needed.    She will see me back in 4 weeks to 6 weeks and if she has pain that persists, may consider lumbar MRI at that time to further investigate whether facet injections or disc pathology is playing a greater role.     She is also counseled regarding activity modifications, especially in the gym.    It was a pleasure seeing Lashonda today.    Christ Akhtar DO, [facility]  Primary Care Sports Medicine    45 minutes on date of the encounter doing chart review, history and examination, independent imaging review, documentation, and additional activities noted above.        Again, thank you for allowing me to participate in the care of your patient.         Sincerely,        Christ Akhtar, DO

## 2021-07-30 NOTE — TELEPHONE ENCOUNTER
Last seen: 5/10  RTC: 6 months   Cancel: none   No-show: none   Next appt: 5/10    Incoming refill from patient via phone     Medication requested: amphetamine-dextroamphetamine (ADDERALL) 20 MG tablet  Directions: Take 2 tabs in the morning, 1 tab at noon.  Qty: 90  Last refilled: 7/6 #90, 6/8 #90, 5/11 #90    Per chart review, patient should have enough mediations to last until 8/3. Will route to provider for approval and confirm with patient.

## 2021-07-30 NOTE — NURSING NOTE
Lashonda Rodriguez's goals for this visit include:   Chief Complaint   Patient presents with     Lower Back - Pain       She requests these members of her care team be copied on today's visit information:     PCP: No Ref-Primary, Physician    Referring Provider:  No referring provider defined for this encounter.    /68 (BP Location: Left arm, Patient Position: Sitting, Cuff Size: Adult Regular)     Do you need any medication refills at today's visit? Beba Chadwick CMA

## 2021-07-30 NOTE — PATIENT INSTRUCTIONS
Thanks for coming today.  Ortho/Sports Medicine Clinic  [address] [place] [address]    To schedule future appointments in Ortho Clinic, you may call [phone].    To schedule ordered imaging by your provider:   Call Central Imaging [phone]    To schedule an injection ordered by your provider:  Call Central Imaging Injection scheduling line: [phone]  [facility] available online at:  [email or web address]    Please call if any further questions or concerns ([phone]).  Clinic hours 8 am to 5 pm.    Return to clinic (call) if symptoms worsen or fail to improve.

## 2021-08-02 RX ORDER — DEXTROAMPHETAMINE SACCHARATE, AMPHETAMINE ASPARTATE, DEXTROAMPHETAMINE SULFATE AND AMPHETAMINE SULFATE 5; 5; 5; 5 MG/1; MG/1; MG/1; MG/1
TABLET ORAL
Qty: 90 TABLET | Refills: 0 | Status: SHIPPED | OUTPATIENT
Start: 2021-08-02 | End: 2021-09-22

## 2021-08-19 ENCOUNTER — MYC REFILL (OUTPATIENT)
Dept: PSYCHIATRY | Facility: CLINIC | Age: 41
End: 2021-08-19

## 2021-08-19 DIAGNOSIS — F33.42 MAJOR DEPRESSIVE DISORDER, RECURRENT EPISODE, IN FULL REMISSION (H): ICD-10-CM

## 2021-08-19 RX ORDER — LAMOTRIGINE 150 MG/1
150 TABLET ORAL DAILY
Qty: 90 TABLET | Refills: 0 | Status: SHIPPED | OUTPATIENT
Start: 2021-08-19 | End: 2021-10-18

## 2021-08-19 NOTE — TELEPHONE ENCOUNTER
Last seen: 5/10  RTC: 6 months   Cancel: none   No-show: none   Next appt: 11/1    Incoming refill from patient via MyChart     Medication requested: lamoTRIgine (LAMICTAL) 150 MG tablet  Directions: Take 1 tablet (150 mg) by mouth daily - Oral  Qty: 90  Last refilled: 5/10    Medication refill approved per refill protocol

## 2021-08-26 ENCOUNTER — THERAPY VISIT (OUTPATIENT)
Dept: PHYSICAL THERAPY | Facility: CLINIC | Age: 41
End: 2021-08-26
Payer: COMMERCIAL

## 2021-08-26 DIAGNOSIS — M54.9 BACK PAIN: Primary | ICD-10-CM

## 2021-08-26 PROCEDURE — 97161 PT EVAL LOW COMPLEX 20 MIN: CPT | Mod: GP | Performed by: PHYSICAL THERAPIST

## 2021-08-26 PROCEDURE — 97530 THERAPEUTIC ACTIVITIES: CPT | Mod: GP | Performed by: PHYSICAL THERAPIST

## 2021-08-26 PROCEDURE — 97110 THERAPEUTIC EXERCISES: CPT | Mod: GP | Performed by: PHYSICAL THERAPIST

## 2021-08-26 NOTE — PROGRESS NOTES
Physical Therapy Initial Evaluation  Subjective:    Patient Health History  Lashonda Rodriguez being seen for Back pain.     Problem began: 4/16/2021.   Problem occurred: Unsure   Pain is reported as 5/10 on pain scale.  General health as reported by patient is good.       Medical allergies: none.   Surgeries include:  None.    Current medications:  Other. Other medications details: See medication list.    Current occupation is Purchasing.   Primary job tasks include:  Computer work.                  Therapist Generated HPI Evaluation  Problem details: Pt reports she started a strength ex program at [facility], spring 2021, possibly a correlation with the LBP.  Pt Is a drummer in a band, notes increase in LBP the day after practicing or playing.      Pt is able to complete goblet squats vs deadlifts at the gym, using 20-30#, without pain.    Pt is able to run, but feels like she is not in alignment.  She has been to chiro with min change or improvement..         Type of problem:  Lumbar.      Condition occurred with:  Insidious onset.  Where condition occurred: for unknown reasons.  Patient reports pain:  Lower lumbar spine.  Pain is described as aching and sharp and is constant.  Pain radiates to:  No radiation. Pain is the same all the time.  Since onset symptoms are gradually worsening.  Symptoms are exacerbated by sitting, lifting, bending and standing  and relieved by activity/movement and ice.  Special tests included:  X-ray.  Previous treatment includes chiropractic.   Restrictions due to condition include:  Working in normal job without restrictions.  Barriers include:  None as reported by patient.                        Objective:  System         Lumbar/SI Evaluation  ROM:    AROM Lumbar:   Flexion:          Wnl, stretch B HS  Ext:                    50%, +   Side Bend:        Left:  Wnl    Right:  Wnl  Rotation:           Left:     Right:   Side Glide:        Left:     Right:         Strength: B COSME  4+/5, core 4/5          Neural Tension/Mobility:      Left side:SLR; SLR w/DF or Slump  negative.     Right side:   SLR w/DF or SLR  negative.   Lumbar Palpation:    Tenderness present at Left:    Erector Spinae  Tenderness present at Right: Quadratus Lumborum and Erector Spinae    Lumbar Provocation:      Left negative with:  PROM hip  Right positive with: PROM hip                                                 General     ROS    Assessment/Plan:    Patient is a 41 year old female with lumbar complaints.    Patient has the following significant findings with corresponding treatment plan.                Diagnosis 1:  Low back pain  Pain -  self management, education and home program  Decreased ROM/flexibility - manual therapy and therapeutic exercise  Decreased strength - therapeutic exercise and therapeutic activities    Therapy Evaluation Codes:   1) History comprised of:   Personal factors that impact the plan of care:      None.    Comorbidity factors that impact the plan of care are:      None.     Medications impacting care: None.  2) Examination of Body Systems comprised of:   Body structures and functions that impact the plan of care:      Lumbar spine.   Activity limitations that impact the plan of care are:      Sitting and Standing.  3) Clinical presentation characteristics are:   Stable/Uncomplicated.  4) Decision-Making    Low complexity using standardized patient assessment instrument and/or measureable assessment of functional outcome.  Cumulative Therapy Evaluation is: Low complexity.    Previous and current functional limitations:  (See Goal Flow Sheet for this information)    Short term and Long term goals: (See Goal Flow Sheet for this information)     Communication ability:  Patient appears to be able to clearly communicate and understand verbal and written communication and follow directions correctly.  Treatment Explanation - The following has been discussed with the patient:   RX ordered/plan  of care  Anticipated outcomes  Possible risks and side effects  This patient would benefit from PT intervention to resume normal activities.   Rehab potential is good.    Frequency:  1 X week, once daily  Duration:  for 6 weeks  Discharge Plan:  Achieve all LTG.  Independent in home treatment program.  Reach maximal therapeutic benefit.    Please refer to the daily flowsheet for treatment today, total treatment time and time spent performing 1:1 timed codes.

## 2021-09-22 ENCOUNTER — MYC REFILL (OUTPATIENT)
Dept: PSYCHIATRY | Facility: CLINIC | Age: 41
End: 2021-09-22

## 2021-09-22 DIAGNOSIS — G47.419 PRIMARY NARCOLEPSY WITHOUT CATAPLEXY: ICD-10-CM

## 2021-09-22 RX ORDER — DEXTROAMPHETAMINE SACCHARATE, AMPHETAMINE ASPARTATE, DEXTROAMPHETAMINE SULFATE AND AMPHETAMINE SULFATE 5; 5; 5; 5 MG/1; MG/1; MG/1; MG/1
TABLET ORAL
Qty: 90 TABLET | Refills: 0 | Status: SHIPPED | OUTPATIENT
Start: 2021-09-22 | End: 2021-10-18

## 2021-09-22 NOTE — TELEPHONE ENCOUNTER
Last seen: 5/10  RTC: 6 months   Cancel: none   No-show: none   Next appt: 11/1    Incoming refill from patient via [employer]     Medication requested: amphetamine-dextroamphetamine (ADDERALL) 20 MG tablet  Directions: Take 2 tabs in the morning, 1 tab at noon.  Qty: 90  Last refilled: 8/4 #90, 7/6 #90, 6/8 #90    Will route to provider for approval

## 2021-09-26 ENCOUNTER — HEALTH MAINTENANCE LETTER (OUTPATIENT)
Age: 41
End: 2021-09-26

## 2021-10-18 ENCOUNTER — MYC REFILL (OUTPATIENT)
Dept: PSYCHIATRY | Facility: CLINIC | Age: 41
End: 2021-10-18

## 2021-10-18 DIAGNOSIS — G47.419 PRIMARY NARCOLEPSY WITHOUT CATAPLEXY: ICD-10-CM

## 2021-10-18 DIAGNOSIS — F33.42 MAJOR DEPRESSIVE DISORDER, RECURRENT EPISODE, IN FULL REMISSION (H): ICD-10-CM

## 2021-10-18 NOTE — TELEPHONE ENCOUNTER
Last seen: 5/10  RTC: 6 months   Cancel: none   No-show: none   Next appt: 11/1    Incoming refill from patient via [employer]         Disp Refills Start End SEPIDEH   lamoTRIgine (LAMICTAL) 150 MG tablet 90 tablet 0 8/19/2021  No   Sig - Route: Take 1 tablet (150 mg) by mouth daily - Oral   Sent to pharmacy as: lamoTRIgine 150 MG Oral Tablet (LaMICtal)   Class: E-Prescribe   Order: [id number]   E-Prescribing Status: Receipt confirmed by pharmacy (8/19/2021  3:15 PM CDT)   - Should have refills at the pharmacy      Disp Refills Start End SEPIDEH   amphetamine-dextroamphetamine (ADDERALL) 20 MG tablet 90 tablet 0 9/22/2021  No   Sig: Take 2 tabs in the morning, 1 tab at noon.   Sent to pharmacy as: Amphetamine-Dextroamphetamine 20 MG Oral Tablet (Adderall)   Class: E-Prescribe   Earliest Fill Date: 9/22/2021   Order: [id number]   E-Prescribing Status: Receipt confirmed by pharmacy (9/22/2021 11:11 AM CDT)   Per  last refilled: 9/22 #90, 8/4 #90, 7/6 #90     Per chart review, patient should have one month supply of Lamictal. Placed a call to pharmacy and confirmed she was last dispensed 90 day supply on 8/20. Will notify patient via [employer].     Will route to provider for approval to refill Adderall.

## 2021-10-20 RX ORDER — LAMOTRIGINE 150 MG/1
150 TABLET ORAL DAILY
Qty: 90 TABLET | Refills: 0 | Status: SHIPPED | OUTPATIENT
Start: 2021-10-20 | End: 2021-11-22

## 2021-10-20 RX ORDER — DEXTROAMPHETAMINE SACCHARATE, AMPHETAMINE ASPARTATE, DEXTROAMPHETAMINE SULFATE AND AMPHETAMINE SULFATE 5; 5; 5; 5 MG/1; MG/1; MG/1; MG/1
TABLET ORAL
Qty: 90 TABLET | Refills: 0 | Status: SHIPPED | OUTPATIENT
Start: 2021-10-20 | End: 2021-11-22

## 2021-11-22 ENCOUNTER — VIRTUAL VISIT (OUTPATIENT)
Dept: PSYCHIATRY | Facility: CLINIC | Age: 41
End: 2021-11-22
Attending: NURSE PRACTITIONER
Payer: COMMERCIAL

## 2021-11-22 DIAGNOSIS — G47.419 PRIMARY NARCOLEPSY WITHOUT CATAPLEXY: ICD-10-CM

## 2021-11-22 DIAGNOSIS — F33.42 MAJOR DEPRESSIVE DISORDER, RECURRENT EPISODE, IN FULL REMISSION (H): ICD-10-CM

## 2021-11-22 PROCEDURE — 99214 OFFICE O/P EST MOD 30 MIN: CPT | Mod: 95 | Performed by: NURSE PRACTITIONER

## 2021-11-22 RX ORDER — LAMOTRIGINE 150 MG/1
150 TABLET ORAL DAILY
Qty: 90 TABLET | Refills: 1 | Status: SHIPPED | OUTPATIENT
Start: 2021-11-22 | End: 2022-06-22

## 2021-11-22 RX ORDER — DEXTROAMPHETAMINE SACCHARATE, AMPHETAMINE ASPARTATE, DEXTROAMPHETAMINE SULFATE AND AMPHETAMINE SULFATE 5; 5; 5; 5 MG/1; MG/1; MG/1; MG/1
TABLET ORAL
Qty: 90 TABLET | Refills: 0 | Status: SHIPPED | OUTPATIENT
Start: 2021-11-22 | End: 2022-03-16

## 2021-11-22 RX ORDER — DEXTROAMPHETAMINE SACCHARATE, AMPHETAMINE ASPARTATE, DEXTROAMPHETAMINE SULFATE AND AMPHETAMINE SULFATE 5; 5; 5; 5 MG/1; MG/1; MG/1; MG/1
TABLET ORAL
Qty: 90 TABLET | Refills: 0 | Status: SHIPPED | OUTPATIENT
Start: 2021-11-22 | End: 2022-02-22

## 2021-11-22 RX ORDER — DEXTROAMPHETAMINE SACCHARATE, AMPHETAMINE ASPARTATE, DEXTROAMPHETAMINE SULFATE AND AMPHETAMINE SULFATE 5; 5; 5; 5 MG/1; MG/1; MG/1; MG/1
TABLET ORAL
Qty: 90 TABLET | Refills: 0 | Status: SHIPPED | OUTPATIENT
Start: 2021-11-22 | End: 2022-03-01

## 2021-11-22 ASSESSMENT — PAIN SCALES - GENERAL: PAINLEVEL: NO PAIN (0)

## 2021-11-22 NOTE — PROGRESS NOTES
"VIDEO VISIT  Lashonda Rodriguez is a 41 year old patient who is being evaluated via a billable video visit.      The patient has been notified of following:   \"This video visit will be conducted via a call between you and your physician/provider. We have found that certain health care needs can be provided without the need for an in-person physical exam. This service lets us provide the care you need with a video conversation. If a prescription is necessary we can send it directly to your pharmacy. If lab work is needed we can place an order for that and you can then stop by our lab to have the test done at a later time. Insurers are generally covering virtual visits as they would in-office visits so billing should not be different than normal.  If for some reason you do get billed incorrectly, you should contact the billing office to correct it and that number is in the AVS .    Video Conference to be completed via:  Paulina    Patient has given verbal consent for video visit?:  Yes    Patient would prefer that any video invitations be sent by: Send to e-mail at: [email or web address]      How would patient like to obtain AVS?:  Prezma    AVS SmartPhrase [PsychAVS] has been placed in 'Patient Instructions':  Yes     Video- Visit Details  Type of service:  video visit for medication management  Time of service:    Date:  11/22/2021    Video Start Time: 8:04a     Video End Time: 8:35a    Reason for video visit:  Patient has requested telehealth visit  Originating Site (patient location):  [facility]   Location- Patient's home  Distant Site (provider location):  Remote location  Mode of Communication:  Video Conference via [employer]Well  Consent:  Patient has given verbal consent for video visit?: Yes        [facility]  Psychiatry Clinic  PSYCHIATRIC PROGRESS NOTE  telemedicine       Lashonda Rodriguez is a 41 year old female who prefers the name Josi and pronouns she, her, hers, " herself.  Therapist: couples counseling as needed  PCP: Selma Alba  Other Providers: None     PREVIOUS PSYCH MED TRIALS:  - Provigil (limited effect, 2-3 month trial)  - Nuvigil 250mg (trialed in 2013-ineffective)  - buspar (unknown)  - Wellbutrin XL (taken with Adderall, two seizures in 2009/2010)  - AMT 10mg for skin picking (unknown)  - NAC (ineffective, unsure about her compliance)     Pertinent Background:  See previous notes.  Psych critical item history includes [no critical items].      Interim History                                                                                                        4, 4      The patient is a fair historian, reports good treatment adherence and was last seen 5/10/2021 when she chose to continue lamotrigine 150mg daily, Adderall 40mg QAM, 20mg Qnoon.    Since the last visit, she's been pretty good.  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     Recent Symptoms:   Depression: insignificant symptoms  Anxiety: less frequent skin picking, recent overwhelm, increased anxiety with multiple demands     ADVERSE EFFECTS: none  MEDICAL CONCERNS: none today     APPETITE: OK, stable weight, eats vegan (focuses on protein shakes, PB, leafy greens)    SLEEP: with melatonin 10mg, sleeping 6-7 hours nightly     Substance Use:  Alcohol- limits, prefers craft beer  Caffeine-  takes No Doze with Adderall for narcolepsy     Per 9/14/2016 note: history of alcohol, cannabis, meth abuse, treatment about 16yo in 1997 for cannabis        Social/ Family History                                  [per patient report]                                 1ea,1ea      FINANCIAL SUPPORT- working as an Operations  at [employer] since 2013  CHILDREN- two daughters (Desiree b. 2003, Leticia b. 2002)       LIVING SITUATION- lives with  Tono (m. 2015) and two daughters      LEGAL- None  EARLY HISTORY/ EDUCATION- Grew up in [place] with half siblings. BS in Biology from [employer]  SOCIAL/ SPIRITUAL SUPPORT- support from family, friends       CULTURAL INFLUENCES/ IMPACT- UNKNOWN       TRAUMA HISTORY- None  FEELS SAFE AT HOME- Yes  FAMILY HISTORY-  UNKNOWN    Medical / Surgical History                                 Patient Active Problem List   Diagnosis     Major depressive disorder, recurrent episode (H)     Narcolepsy     Bladder spasms     Exercise-induced asthma     CARDIOVASCULAR SCREENING; LDL GOAL LESS THAN 160     Impulse control disorder     Attention deficit disorder     Hx of psychiatric care     Melasma       Past Surgical History:   Procedure Laterality Date     ECTOPIC PREGNANCY SURGERY       GYN SURGERY  ectopic pregnancy     HC TOOTH EXTRACTION W/FORCEP        Medical Review of Systems         [2,10]     Pregnant- No    Breastfeeding- No    Contraception- IUD  A comprehensive review of systems was performed and is negative other than noted in the HPI.  Denies head trauma, LOC.      While taking Wellbutrin, she reports history of general tonic clonic seizure 9/20/2009 without aura with postictal confusion; second seizure while with SO on 1/23/10 (tonic clonic; foaming at the mouth with post ictal confusion and lethargy. Narcolepsy diagnosed about 2004. Remote history of febrile convulsion before [age] yo, her daughter has a history of febrile convulsions.     Allergy     Wellbutrin [bupropion] and Pcn [penicillins]     Wellbutrin- two seizures while taking Wellbutrin and Adderall    Current Medications        Current Outpatient Medications   Medication Sig Dispense Refill     amphetamine-dextroamphetamine (ADDERALL) 20 MG tablet Take 2 tabs in the morning, 1 tab at noon. 90 tablet 0     amphetamine-dextroamphetamine (ADDERALL) 20 MG tablet Take two (2) tabs in the morning and one (1) tab at noon 90 tablet 0     caffeine (NO-DOZE) 200 MG TABS Take 1 tablet (200 mg) by mouth 2 times daily (Patient taking differently: Take 200 mg by mouth 2 times daily 400 mg qam and 200 qnoon) 150 tablet      fluocin-hydroquinone-tretinoin 0.01-4-0.05 % CREA Twice daily To the face 30 g 2     lamoTRIgine (LAMICTAL) 150 MG tablet Take 1 tablet (150 mg) by mouth daily 90 tablet 0     melatonin 5 MG tablet Take 10 mg by mouth nightly as needed        Multiple Vitamins-Minerals (MULTIVITAMIN ADULT PO) Take by mouth daily        acetaminophen (TYLENOL) 500 MG tablet take 1-2 Tabs by mouth every 6 hours as needed.       amphetamine-dextroamphetamine (ADDERALL) 20 MG tablet Take two (2) tabs in the morning and one (1) tab at noon 90 tablet 0     cetirizine (ZYRTEC ALLERGY) 10 MG tablet Take 10 mg by mouth daily as needed        Cyanocobalamin (VITAMIN B 12 PO)  (Patient not taking: Reported on 7/30/2021)       hydroquinone 4 % CREA Externally apply topically daily In the morning (Patient not taking: Reported on 7/30/2021) 30 g 2     Ibuprofen 200 MG capsule take 200-400 mg by mouth every 6 hours as needed.       Vitals         [3, 3]   There were no vitals taken for this visit.   Mental Status Exam        [9, 14 cog gs]     Alertness: alert  and oriented  Appearance: N/A  Behavior/Demeanor: cooperative, pleasant and calm, with N/A eye contact   Speech: normal and regular rate and rhythm  Language: no problems  Psychomotor: N/A  Mood: description consistent with euthymia  Affect: appropriate; was congruent  to mood; was congruent to content  Thought Process/Associations: unremarkable  Thought Content:  Reports none;  Denies suicidal ideation, violent ideation, delusions, preoccupations, obsessions , phobia  and magical thinking  Perception:  Reports none;  Denies auditory hallucinations, visual hallucinations, visual distortion seen as shadows , depersonalization and derealization  Insight: fair  Judgment: good  Cognition: (6) does  appear grossly intact; formal cognitive testing was not done  Gait/Station and/or Muscle Strength/Tone: N/A    Labs and Data                          Rating Scales:    N/A    PHQ9 Today:    PHQ 10/4/2018 4/4/2019 10/7/2019   PHQ-9 Total Score 1 0 0   Q9: Thoughts of better off dead/self-harm past 2 weeks Not at all Not at all Not at all     Diagnosis      primary narcolepsy 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 (diagnosed by sleep study in 2007)  recurrent MDD in remission     Assessment      [m2, h3]     Today the following issues were addressed:      : 11/2021- Adderall 20mg #90 last filled by writer on 10/22/2021     PSYCHOTROPIC DRUG INTERACTIONS:  - LAMOTRIGINE, TYLENOL (may result in decreased lamotrigine effectiveness)     Drug Interaction Management: Monitoring for adverse effects, routine vitals, using lowest therapeutic dose of [psychotropics] and patient is aware of risks    Plan                                                                                                                     m2, h3     1) she chooses to continue lamotrigine 150mg daily, Adderall 40mg QAM, 20mg Qnoon      - she'll contact office for  and refill of Adderall for 3 months  - takes melatonin 10mg at bedtime PRN  - takes No Doze 200mg BID for narcolepsy     2) active in couples counseling      RTC: 6 months, sooner as needed    CRISIS NUMBERS:   Provided routinely in AVS.    Treatment Risk Statement:  The patient understands the risks, benefits, adverse effects and alternatives. Agrees to treatment with the  capacity to do so. No medical contraindications to treatment. Agrees to call clinic for any problems. The patient understands to call 911 or go to the nearest ED if life threatening or urgent symptoms occur.     WHODAS 2.0  TODAY total score = N/A; [a 12-item WHODAS 2.0 assessment was not completed by the pt today and/or recorded in EPIC].    PROVIDER:  AYSE Dumont CNP

## 2022-02-22 ENCOUNTER — MYC REFILL (OUTPATIENT)
Dept: PSYCHIATRY | Facility: CLINIC | Age: 42
End: 2022-02-22
Payer: COMMERCIAL

## 2022-02-22 DIAGNOSIS — G47.419 PRIMARY NARCOLEPSY WITHOUT CATAPLEXY: ICD-10-CM

## 2022-02-22 NOTE — TELEPHONE ENCOUNTER
amphetamine-dextroamphetamine (ADDERALL) 20 MG tablet  Last Written Prescription Date:  11/22/21   Last Fill Quantity: 90,   # refills: 0  Last Office Visit : 11/22/21  Future Office visit:  None noted    Routing refill request to provider for review/approval because:  Controlled medication refill request

## 2022-02-23 ENCOUNTER — TELEPHONE (OUTPATIENT)
Dept: PSYCHIATRY | Facility: CLINIC | Age: 42
End: 2022-02-23
Payer: COMMERCIAL

## 2022-02-23 RX ORDER — DEXTROAMPHETAMINE SACCHARATE, AMPHETAMINE ASPARTATE, DEXTROAMPHETAMINE SULFATE AND AMPHETAMINE SULFATE 5; 5; 5; 5 MG/1; MG/1; MG/1; MG/1
TABLET ORAL
Qty: 90 TABLET | Refills: 0 | Status: SHIPPED | OUTPATIENT
Start: 2022-02-23 | End: 2022-03-01

## 2022-02-23 NOTE — TELEPHONE ENCOUNTER
Prior Authorization Retail Medication Request - RENEWAL    Medication/Dose: amphetamine-dextroamphetamine (ADDERALL) 20 MG tablet - Renewal  ICD code (if different than what is on RX):  Primary narcolepsy 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 [G47.419]   Previously Tried and Failed:  - Provigil (limited effect, 2-3 month trial)  - Nuvigil 250mg (trialed in 2013-ineffective)  - buspar (unknown)  - Wellbutrin XL (taken with Adderall, two seizures in 2009/2010)  - AMT 10mg for skin picking (unknown)  - NAC (ineffective, unsure about her compliance)  Rationale:  Please see approved PA from 2/8/2021. Patient has a long standing history of Narcolepsy 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 diagnosed by a sleep study in 2007. She is prescribed Adderall and caffeine tabs to treat narcolepsy since the diagnosis. Lashonda reports stable mood without skin picking behaviors. she denies side effects of medication from the medications. The use of Adderall (amphetamine salts) is indicated and appropriate. This regimen is both beneficial and well tolerated with no adverse effects or tolerance. There is no evidence of abuse of this medication or other substances. Please If Lashonda is unable to continue at her current dose of Adderall, she will experience worsening of narcolepsy symptoms as she has in the past. The result of which can negatively impact her social and family relationships, job performance, self-esteem and ability to safety operate a motor vehicle. We are requesting an expedited review in matter to avoid any futher interruption in Lashonda being able to refill this medication. Please feel free to contact out clinic with any questions.    Insurance Name:  [employer]  Insurance ID:  [id number]      Pharmacy Information (if different than what is on RX)  Name: [facility] -- [place] - [place], [place] - [address]   Phone:  [phone]

## 2022-02-26 DIAGNOSIS — G47.419 PRIMARY NARCOLEPSY WITHOUT CATAPLEXY: ICD-10-CM

## 2022-02-28 ENCOUNTER — TELEPHONE (OUTPATIENT)
Dept: PSYCHIATRY | Facility: CLINIC | Age: 42
End: 2022-02-28
Payer: COMMERCIAL

## 2022-02-28 NOTE — TELEPHONE ENCOUNTER
Pharmacy sent refill request stating insurance is only allowing 60 tablets per 20 days. Spoke with pharmacy and they are unsure of the reason for change. Patient picked up 20 day supply on 2/27/22. Writer will start PA process requesting 30 day supply.

## 2022-02-28 NOTE — TELEPHONE ENCOUNTER
Prior Authorization Retail Medication Request    Medication/Dose: amphetamine-dextroamphetamine (ADDERALL) 20 MG tablet  ICD code (if different than what is on RX):  same  Previously Tried and Failed:  Adderall 40 mg daily, caffeine pills, armodafinal, Wellbutrin, Concerta   Rationale:  Patient has been on current dose of 60 mg daily since 2009. Patient has been seen by sleep medicine for dx confirmation. There is no clinical indication for change at this time. Please continue to cover 90 tablets for 30 day supply.     Insurance Name:  [employer]   Insurance ID:  [id number]  BIN: [id number]  PCN: [id number]  Group: [id number]      Pharmacy Information (if different than what is on RX)  Name:  same  Phone:  same

## 2022-02-28 NOTE — TELEPHONE ENCOUNTER
Central Prior Authorization Team   Phone: [phone]      PA Initiation    Medication: amphetamine-dextroamphetamine (ADDERALL) 20 MG tablet - Renewal  Insurance Company: [employer] - Phone [phone] Fax [phone]  Pharmacy Filling the Rx: [facility] -- [place], [place] - [address]  Filling Pharmacy Phone: [phone]  Filling Pharmacy Fax:    Start Date: 2/28/2022

## 2022-03-01 NOTE — TELEPHONE ENCOUNTER
-PA for #90/30 days approved (see 2/23/22 telephone encounter).   -Pt last filled 20 day-supply on 2/27/22.  -Pt last seen on 11/22/21 with 6-month RTC. Writer two additional 30-day rxs and routed to Farzaneh Hicks to sign.

## 2022-03-01 NOTE — TELEPHONE ENCOUNTER
Prior Authorization Approval    Authorization Effective Date: 2/28/2022  Authorization Expiration Date: 2/28/2023  Medication: amphetamine-dextroamphetamine (ADDERALL) 20 MG tablet - Renewal  Approved Dose/Quantity:  Reference #:     Insurance Company: [employer] - Phone [phone] Fax [phone]  Expected CoPay:       CoPay Card Available:      Foundation Assistance Needed:    Which Pharmacy is filling the prescription (Not needed for infusion/clinic administered): [facility] -- [place], [place] - [address]  Pharmacy Notified: Yes  Patient Notified: No    Spoke to rep Johnson at the pharmacy and notified her of PA approval. Johnson stated that they will need a new script as they had filled and delivered medication on 2/27/22 for qty# 60 tabs per 20 days.

## 2022-03-01 NOTE — TELEPHONE ENCOUNTER
Duplicate encounter - see telephone encounter dated 02/23/22. I will close this encounter as nothing will get documented in this one.

## 2022-03-02 RX ORDER — DEXTROAMPHETAMINE SACCHARATE, AMPHETAMINE ASPARTATE, DEXTROAMPHETAMINE SULFATE AND AMPHETAMINE SULFATE 5; 5; 5; 5 MG/1; MG/1; MG/1; MG/1
TABLET ORAL
Qty: 90 TABLET | Refills: 0 | Status: SHIPPED | OUTPATIENT
Start: 2022-03-16 | End: 2022-06-22

## 2022-03-02 RX ORDER — DEXTROAMPHETAMINE SACCHARATE, AMPHETAMINE ASPARTATE, DEXTROAMPHETAMINE SULFATE AND AMPHETAMINE SULFATE 5; 5; 5; 5 MG/1; MG/1; MG/1; MG/1
TABLET ORAL
Qty: 90 TABLET | Refills: 0 | Status: SHIPPED | OUTPATIENT
Start: 2022-04-13 | End: 2022-06-22

## 2022-03-16 ENCOUNTER — MYC MEDICAL ADVICE (OUTPATIENT)
Dept: PSYCHIATRY | Facility: CLINIC | Age: 42
End: 2022-03-16
Payer: COMMERCIAL

## 2022-03-16 DIAGNOSIS — G47.419 PRIMARY NARCOLEPSY WITHOUT CATAPLEXY: ICD-10-CM

## 2022-03-16 NOTE — TELEPHONE ENCOUNTER
Per  Adderall last refilled: 3/16 #90, 2/26 #60, 1/17 #90, 12/20 #90     Per chart review, patient will be going out of town and is requesting Adderall to be filled 8 days early. She is due to be filled on 4/13 but is requesting it to be filled on 4/6. Will route to provider for approval.

## 2022-03-17 RX ORDER — DEXTROAMPHETAMINE SACCHARATE, AMPHETAMINE ASPARTATE, DEXTROAMPHETAMINE SULFATE AND AMPHETAMINE SULFATE 5; 5; 5; 5 MG/1; MG/1; MG/1; MG/1
TABLET ORAL
Qty: 90 TABLET | Refills: 0 | Status: SHIPPED | OUTPATIENT
Start: 2022-04-06 | End: 2022-05-20

## 2022-03-18 NOTE — TELEPHONE ENCOUNTER
- Adderall refilled by provider for patient to get it refilled on 4/6    Placed a call to [facility] -- [place] - [place], [place] - [address] and spoke with pharmacist, who agreed to discontinue Adderall with start date of 4/13. Patient notified via [employer].

## 2022-03-30 DIAGNOSIS — Z29.89 NEED FOR PROPHYLAXIS AGAINST URINARY TRACT INFECTION: Primary | ICD-10-CM

## 2022-03-30 RX ORDER — NITROFURANTOIN 25; 75 MG/1; MG/1
100 CAPSULE ORAL 2 TIMES DAILY
Qty: 14 CAPSULE | Refills: 0 | Status: SHIPPED | OUTPATIENT
Start: 2022-03-30 | End: 2022-04-06

## 2022-05-08 ENCOUNTER — HEALTH MAINTENANCE LETTER (OUTPATIENT)
Age: 42
End: 2022-05-08

## 2022-05-20 ENCOUNTER — MYC REFILL (OUTPATIENT)
Dept: PSYCHIATRY | Facility: CLINIC | Age: 42
End: 2022-05-20
Payer: COMMERCIAL

## 2022-05-20 DIAGNOSIS — G47.419 PRIMARY NARCOLEPSY WITHOUT CATAPLEXY: ICD-10-CM

## 2022-05-20 RX ORDER — DEXTROAMPHETAMINE SACCHARATE, AMPHETAMINE ASPARTATE, DEXTROAMPHETAMINE SULFATE AND AMPHETAMINE SULFATE 5; 5; 5; 5 MG/1; MG/1; MG/1; MG/1
TABLET ORAL
Qty: 90 TABLET | Refills: 0 | Status: SHIPPED | OUTPATIENT
Start: 2022-05-20 | End: 2022-06-22

## 2022-05-20 NOTE — PROGRESS NOTES
Subjective:    Patient Health History  Lashonda Rodriguez being seen for Back pain.     Problem began: 4/16/2021.   Problem occurred: Unsure   Pain is reported as 5/10 on pain scale.  General health as reported by patient is good.       Medical allergies: none.   Surgeries include:  None.    Current medications:  Other. Other medications details: See medication list.    Current occupation is Purchasing.   Primary job tasks include:  Computer work.                  Physical Exam  Oswestry Score: 18 %                 Objective:  System    Physical Exam    General     ROS    Assessment/Plan:    DISCHARGE REPORT    Progress reporting period is from 8/26/21 to 5/20/22.       SUBJECTIVE  Subjective changes noted by patient:  unknown.  Subjective: see IE    Current pain level is NA  .     Previous pain level was  NA  .   Changes in function:  None  Adverse reaction to treatment or activity: None    OBJECTIVE  Changes noted in objective findings:  Patient has failed to return to therapy so current objective findings are unknown.  Objective: see IE     ASSESSMENT/PLAN  Updated problem list and treatment plan: Diagnosis 1:  Low back pain  Pain -  hot/cold therapy and manual therapy  Decreased ROM/flexibility - manual therapy and therapeutic exercise  Decreased strength - therapeutic exercise and therapeutic activities  STG/LTGs have been met or progress has been made towards goals:  Yes (See Goal flow sheet completed today.)  Assessment of Progress: The patient has not returned to therapy. Current status is unknown.  Self Management Plans:  HEP  Pt did not return to PT  Lashonda continues to require the following intervention to meet STG and LTG's:  PT    Recommendations:  Unknown, pt did not return to pt      Please refer to the daily flowsheet for treatment today, total treatment time and time spent performing 1:1 timed codes.

## 2022-05-20 NOTE — TELEPHONE ENCOUNTER
Last seen: 11/22/21  RTC: 6 months  Cancel: 0  No-show: 0  Next appt: none - Scheduling has been notified    Incoming refill from patient via Telefonicahart    Medication requested: amphetamine-dextroamphetamine (ADDERALL) 20 MG tablet  Directions: Sig: Take two (2) tabs in the morning and one (1) tab at noon  Qty: 90  Last refilled: Per [place] :        Medication refill pended and routed to provider for approval

## 2022-06-10 ENCOUNTER — OFFICE VISIT (OUTPATIENT)
Dept: ORTHOPEDICS | Facility: CLINIC | Age: 42
End: 2022-06-10
Payer: COMMERCIAL

## 2022-06-10 ENCOUNTER — ANCILLARY PROCEDURE (OUTPATIENT)
Dept: GENERAL RADIOLOGY | Facility: CLINIC | Age: 42
End: 2022-06-10
Attending: FAMILY MEDICINE
Payer: COMMERCIAL

## 2022-06-10 VITALS — BODY MASS INDEX: 26.63 KG/M2 | WEIGHT: 165 LBS

## 2022-06-10 DIAGNOSIS — M25.519 PAIN IN JOINT, SHOULDER REGION: ICD-10-CM

## 2022-06-10 DIAGNOSIS — S49.92XA INJURY OF LEFT SHOULDER, INITIAL ENCOUNTER: Primary | ICD-10-CM

## 2022-06-10 DIAGNOSIS — M25.519 PAIN IN JOINT, SHOULDER REGION: Primary | ICD-10-CM

## 2022-06-10 PROCEDURE — 99214 OFFICE O/P EST MOD 30 MIN: CPT | Performed by: FAMILY MEDICINE

## 2022-06-10 PROCEDURE — 73030 X-RAY EXAM OF SHOULDER: CPT | Mod: LT | Performed by: RADIOLOGY

## 2022-06-10 RX ORDER — DICLOFENAC SODIUM 75 MG/1
75 TABLET, DELAYED RELEASE ORAL 2 TIMES DAILY
Qty: 60 TABLET | Refills: 0 | Status: SHIPPED | OUTPATIENT
Start: 2022-06-10 | End: 2022-12-01

## 2022-06-10 NOTE — LETTER
6/10/2022      RE: Lashonda Rodriguez  [address]  [facility] [address]     Dear Colleague,    Thank you for referring your patient, Lashonda Rodriguez, to the [facility] [place]. Please see a copy of my visit note below.     Subjective:   Lashonda Rodriguez is a 42 year old female who presents for left shoulder pain.     Patient reports that she was lifting about 1 week ago, doing bench press. She didn't necessarily have pain during that but felt like the weight was moving through her shoulder in a strange way. Yesterday she started to do some shoulder exercises with codman's and stretches and then last night she all of a sudden had a lot of pain and reduced range of motion. The pain is over the lateral and anterior part of her shoulder. She also has noticed some tingling down her arm and it feels like her arm is sort of handing down.    She is right hand dominant. She does enjoy lifting regularly, but did take about a 2 month break from lifting and just recently started up again at the end of May.    No previously shoulder injuries. No previous dislocations.     She hasn't tried any OTC medications.     PAST MEDICAL, SOCIAL, SURGICAL AND FAMILY HISTORY: Past medical, surgical, family and social history was reviewed and unchanged since last visit.  ALLERGIES: She is allergic to wellbutrin [bupropion] and pcn [penicillins].    CURRENT MEDICATIONS: She has a current medication list which includes the following prescription(s): amphetamine-dextroamphetamine, amphetamine-dextroamphetamine, amphetamine-dextroamphetamine, caffeine, fluocin-hydroquinone-tretinoin, lamotrigine, melatonin, multiple vitamin, acetaminophen, cetirizine, cyanocobalamin, hydroquinone, and ibuprofen.     REVIEW OF SYSTEMS: 3 point review of systems is negative except as noted above.     Exam:   Wt 74.8 kg (165 lb)   BMI 26.63 kg/m       CONSTITUTIONAL: healthy, alert and no  distress  HEAD: Normocephalic. No masses, lesions, tenderness or abnormalities  SKIN: no suspicious lesions or rashes  GAIT: normal  NEUROLOGIC: Non-focal  PSYCHIATRIC: affect normal/bright and mentation appears normal.    SHOULDER: Left  Musculoskeletal - shoulder  - inspection: normal bone and joint alignment, no obvious deformity, no scapular winging, no AC step-off  - palpation: no bony or soft tissue tenderness, normal clavicle, non-tender AC, non-tender biceps  - ROM:  Significantly decreased flexion to 110 degrees, abduction to 90s degrees, internal rotation to posterior hip, full ER  - strength: 5/5  strength, pain and weakness with external and internal rotation against resistance  - special tests:  (-) Speed's  (+) Ion  (+) [place]'s  (+) apprehension  Neuro  - no sensory or motor deficit, grossly normal coordination, normal muscle tone  Skin  - no ecchymosis, erythema, warmth, or induration, no obvious rash    X-RAY INTERPRETATION:   X-Ray of the Left Shoulder: 3-view, ap, y, axillary   Findings:  Standing AP, Grashey, transscapular Y, and axillary  views of the left  shoulder were obtained.      The clavicle is slightly cephalad in relation to the acromion, with  slight increased distance of the joint margins.     Normal glenohumeral joint. Normal acromiohumeral interval distance.     No acute fracture visualized.     Soft tissue is unremarkable. The visualized lung is clear.                                                                      Impression:  Question of age indeterminant AC joint injury, with mild/grade II  separation. Correlate clinically.     Assessment/Plan:   Left Shoulder Injury and Pain: Concern for acute rotator cuff injury although does not have a good mechanism for this, however significantly reduced range of motion and decreased strength. Also could be the start of adhesive capsulitis or bursitis, less likely labral injury.   - MRI Left shoulder  - diclofenac 75mg BID  -  follow up after MRI next week      The patient was seen by and discussed with Dr.Suzanne MUMTAZ Paulson MD, CAQ, CCD    Laura Hernandez MD  Primary Care Sports Medicine Fellow            Again, thank you for allowing me to participate in the care of your patient.      Sincerely,    Daphnie Paulson MD

## 2022-06-10 NOTE — LETTER
Date:June 13, 2022      Patient was self referred, no letter generated. Do not send.        [facility] Health Information

## 2022-06-10 NOTE — PROGRESS NOTES
Subjective:   Lashonda Rodriguez is a 42 year old female who presents for left shoulder pain.     Patient reports that she was lifting about 1 week ago, doing bench press. She didn't necessarily have pain during that but felt like the weight was moving through her shoulder in a strange way. Yesterday she started to do some shoulder exercises with codman's and stretches and then last night she all of a sudden had a lot of pain and reduced range of motion. The pain is over the lateral and anterior part of her shoulder. She also has noticed some tingling down her arm and it feels like her arm is sort of handing down.    She is right hand dominant. She does enjoy lifting regularly, but did take about a 2 month break from lifting and just recently started up again at the end of May.    No previously shoulder injuries. No previous dislocations.     She hasn't tried any OTC medications.     PAST MEDICAL, SOCIAL, SURGICAL AND FAMILY HISTORY: Past medical, surgical, family and social history was reviewed and unchanged since last visit.  ALLERGIES: She is allergic to wellbutrin [bupropion] and pcn [penicillins].    CURRENT MEDICATIONS: She has a current medication list which includes the following prescription(s): amphetamine-dextroamphetamine, amphetamine-dextroamphetamine, amphetamine-dextroamphetamine, caffeine, fluocin-hydroquinone-tretinoin, lamotrigine, melatonin, multiple vitamin, acetaminophen, cetirizine, cyanocobalamin, hydroquinone, and ibuprofen.     REVIEW OF SYSTEMS: 3 point review of systems is negative except as noted above.     Exam:   Wt 74.8 kg (165 lb)   BMI 26.63 kg/m       CONSTITUTIONAL: healthy, alert and no distress  HEAD: Normocephalic. No masses, lesions, tenderness or abnormalities  SKIN: no suspicious lesions or rashes  GAIT: normal  NEUROLOGIC: Non-focal  PSYCHIATRIC: affect normal/bright and mentation appears normal.    SHOULDER: Left  Musculoskeletal - shoulder  - inspection: normal bone  and joint alignment, no obvious deformity, no scapular winging, no AC step-off  - palpation: no bony or soft tissue tenderness, normal clavicle, non-tender AC, non-tender biceps  - ROM:  Significantly decreased flexion to 110 degrees, abduction to 90s degrees, internal rotation to posterior hip, full ER  - strength: 5/5  strength, pain and weakness with external and internal rotation against resistance  - special tests:  (-) Speed's  (+) Ion  (+) [place]'s  (+) apprehension  Neuro  - no sensory or motor deficit, grossly normal coordination, normal muscle tone  Skin  - no ecchymosis, erythema, warmth, or induration, no obvious rash    X-RAY INTERPRETATION:   X-Ray of the Left Shoulder: 3-view, ap, y, axillary   Findings:  Standing AP, Grashey, transscapular Y, and axillary  views of the left  shoulder were obtained.      The clavicle is slightly cephalad in relation to the acromion, with  slight increased distance of the joint margins.     Normal glenohumeral joint. Normal acromiohumeral interval distance.     No acute fracture visualized.     Soft tissue is unremarkable. The visualized lung is clear.                                                                      Impression:  Question of age indeterminant AC joint injury, with mild/grade II  separation. Correlate clinically.     Assessment/Plan:   Left Shoulder Injury and Pain: Concern for acute rotator cuff injury although does not have a good mechanism for this, however significantly reduced range of motion and decreased strength. Also could be the start of adhesive capsulitis or bursitis, less likely labral injury.   - MRI Left shoulder  - diclofenac 75mg BID  - follow up after MRI next week      The patient was seen by and discussed with Dr.Suzanne MUMTAZ Paulson MD, CAQ, CCD    Laura Hernandez MD  Primary Care Sports Medicine Fellow

## 2022-06-13 ENCOUNTER — ANCILLARY PROCEDURE (OUTPATIENT)
Dept: MRI IMAGING | Facility: CLINIC | Age: 42
End: 2022-06-13
Attending: FAMILY MEDICINE
Payer: COMMERCIAL

## 2022-06-13 DIAGNOSIS — S49.92XA INJURY OF LEFT SHOULDER, INITIAL ENCOUNTER: ICD-10-CM

## 2022-06-13 PROCEDURE — 73221 MRI JOINT UPR EXTREM W/O DYE: CPT | Mod: LT | Performed by: RADIOLOGY

## 2022-06-16 DIAGNOSIS — L81.1 MELASMA: ICD-10-CM

## 2022-06-17 ENCOUNTER — OFFICE VISIT (OUTPATIENT)
Dept: ORTHOPEDICS | Facility: CLINIC | Age: 42
End: 2022-06-17
Payer: COMMERCIAL

## 2022-06-17 DIAGNOSIS — M75.82 ROTATOR CUFF TENDINITIS, LEFT: Primary | ICD-10-CM

## 2022-06-17 PROCEDURE — 99213 OFFICE O/P EST LOW 20 MIN: CPT | Performed by: FAMILY MEDICINE

## 2022-06-17 NOTE — LETTER
Date:June 20, 2022      Patient was self referred, no letter generated. Do not send.        [facility] Health Information

## 2022-06-17 NOTE — PROGRESS NOTES
S: f/u shoulder MRI.  Her shoulder is feeling much better.  Almost canceled her MRI appointment due to rapid improvement.              O:  NAD  There were no vitals taken for this visit.    L shoulder: FROM  Minor discomfort with abduction from 150-180 degrees   4/5 B infraspinatus strength:  No pain  Ion's testing:  No pain or weakness      Narrative & Impression   EXAM: MR left shoulder without  contrast 6/13/2022 12:35 PM     TECHNIQUE: Multiplanar, multisequence imaging of the left shoulder  were obtained without administration of intravenous or intra-articular  gadolinium contrast using routine protocol.     History: Acute left shoulder pain with limited range of motion and  strength, evaluate for rotator cuff tear; Injury of left shoulder,  initial encounter      Additional Clinical information from EMR: Pain while doing bench press     Comparison: Radiograph 6/10/2022     Findings:     ROTATOR CUFF and ASSOCIATED STRUCTURES  Rotator cuff:   Low-grade intrasubstance tear of the posterior fibers of the  supraspinatus extending into the anterior infraspinatus fibers at the  footprint (series 9, image 24 and 25). Teres minor and subscapularis  tendons are intact.      Bursa: No subacromial or subdeltoid bursal fluid.     Musculature: Muscle bulk of rotator cuff is preserved.  Deltoid muscle  bulk is also preserved.  No muscle edema.     Acromioclavicular joint  There are mild degenerative changes of the acromioclavicular joint.  Acromion is type 2 in sagittal morphology.  Coracoacromial ligament is  not thickened.     OSSEOUS STRUCTURES  No fracture, marrow contusion or marrow infiltration. Patchy red  marrow in the proximal humerus, sparing the epiphysis.     LONG BICIPITAL TENDON  The long head of the biceps tendon is normally situated within the  bicipital groove. No complete or partial biceps tendon tear is  present.     GLENOHUMERAL JOINT  Joint fluid: Physiologic amount of joint fluid is   present.     Cartilage and subarticular bone:  No focal hyaline cartilage defects  are noted. No Hill-Sachs, reverse Hill-Sachs, or bony Bankart lesions  are seen.     Labrum: Limited assessment on this study with relative lack of joint  distention shows no labral tear.                                                                      Impression:  1. Low-grade focal intrasubstance partial thickness tear of the left  shoulder supraspinatus/infraspinatus junctional fibers at the  footprint.   2. Rotator cuff and deltoid muscle bulk is normal.  3. Normal-appearing biceps tendon.  4. No significant osteoarthrosis at the left shoulder  acromioclavicular or glenohumeral joints.     I have personally reviewed the examination and initial interpretation  and I agree with the findings.     KHADIJAH HERMAN MD         SYSTEM ID:  [id number]       A:  L shoulder atraumatic suprapinatus infaspinatus junction intrasubtance tearing  Mild OA of the AC joint      P:  We reviewed her MRI report and images.  Discussed the next steps.  She has weakness of her infraspinatus.  I have recommended a short course of PT to work on cuff and periscapular strengthening.  She like to work out with weights.  I have advise starting with a 50% weight reduction from where she left off and gradually build up over time and incorporate PT rehab exercises.      No need for medication oral or injection since she is essentially pain free at this point.      RTC PRN.      Daphnie Paulson MD, CAQ, FACSM, CCD  [facility]  Sports Medicine and Bone Health  Team Physician;  Athletics

## 2022-06-17 NOTE — LETTER
6/17/2022      RE: Lashonda Rodriguez  [address]  [facility] [address]     Dear Colleague,    Thank you for referring your patient, Lashonda Rodriguez, to the [facility]IC[facility]IN[facility] [place]. Please see a copy of my visit note below.    S: f/u shoulder MRI.  Her shoulder is feeling much better.  Almost canceled her MRI appointment due to rapid improvement.              O:  NAD  There were no vitals taken for this visit.    L shoulder: FROM  Minor discomfort with abduction from 150-180 degrees   4/5 B infraspinatus strength:  No pain  Ion's testing:  No pain or weakness      Narrative & Impression   EXAM: MR left shoulder without  contrast 6/13/2022 12:35 PM     TECHNIQUE: Multiplanar, multisequence imaging of the left shoulder  were obtained without administration of intravenous or intra-articular  gadolinium contrast using routine protocol.     History: Acute left shoulder pain with limited range of motion and  strength, evaluate for rotator cuff tear; Injury of left shoulder,  initial encounter      Additional Clinical information from EMR: Pain while doing bench press     Comparison: Radiograph 6/10/2022     Findings:     ROTATOR CUFF and ASSOCIATED STRUCTURES  Rotator cuff:   Low-grade intrasubstance tear of the posterior fibers of the  supraspinatus extending into the anterior infraspinatus fibers at the  footprint (series 9, image 24 and 25). Teres minor and subscapularis  tendons are intact.      Bursa: No subacromial or subdeltoid bursal fluid.     Musculature: Muscle bulk of rotator cuff is preserved.  Deltoid muscle  bulk is also preserved.  No muscle edema.     Acromioclavicular joint  There are mild degenerative changes of the acromioclavicular joint.  Acromion is type 2 in sagittal morphology.  Coracoacromial ligament is  not thickened.     OSSEOUS STRUCTURES  No fracture, marrow contusion or marrow infiltration. Patchy red  marrow in the proximal  humerus, sparing the epiphysis.     LONG BICIPITAL TENDON  The long head of the biceps tendon is normally situated within the  bicipital groove. No complete or partial biceps tendon tear is  present.     GLENOHUMERAL JOINT  Joint fluid: Physiologic amount of joint fluid is  present.     Cartilage and subarticular bone:  No focal hyaline cartilage defects  are noted. No Hill-Sachs, reverse Hill-Sachs, or bony Bankart lesions  are seen.     Labrum: Limited assessment on this study with relative lack of joint  distention shows no labral tear.                                                                      Impression:  1. Low-grade focal intrasubstance partial thickness tear of the left  shoulder supraspinatus/infraspinatus junctional fibers at the  footprint.   2. Rotator cuff and deltoid muscle bulk is normal.  3. Normal-appearing biceps tendon.  4. No significant osteoarthrosis at the left shoulder  acromioclavicular or glenohumeral joints.     I have personally reviewed the examination and initial interpretation  and I agree with the findings.     KHADIJAH HERMAN MD         SYSTEM ID:  [id number]       A:  L shoulder atraumatic suprapinatus infaspinatus junction intrasubtance tearing  Mild OA of the AC joint      P:  We reviewed her MRI report and images.  Discussed the next steps.  She has weakness of her infraspinatus.  I have recommended a short course of PT to work on cuff and periscapular strengthening.  She like to work out with weights.  I have advise starting with a 50% weight reduction from where she left off and gradually build up over time and incorporate PT rehab exercises.      No need for medication oral or injection since she is essentially pain free at this point.      RTC PRN.      Daphnie Paulson MD, CAQ, FACSM, CCD  [facility]  Sports Medicine and Bone Health  Team Physician;  Athletics        Again, thank you for allowing me to participate in the care of your patient.       Sincerely,    Daphnie Paulson MD

## 2022-06-21 NOTE — TELEPHONE ENCOUNTER
LAST SEEN 6/6/19  * NEEDS TO SCHEDULE APPT Scheduling has been notified to contact the pt for appointment.

## 2022-06-22 ENCOUNTER — VIRTUAL VISIT (OUTPATIENT)
Dept: PSYCHIATRY | Facility: CLINIC | Age: 42
End: 2022-06-22
Attending: NURSE PRACTITIONER
Payer: COMMERCIAL

## 2022-06-22 ENCOUNTER — MYC MEDICAL ADVICE (OUTPATIENT)
Dept: DERMATOLOGY | Facility: CLINIC | Age: 42
End: 2022-06-22

## 2022-06-22 DIAGNOSIS — F33.42 MAJOR DEPRESSIVE DISORDER, RECURRENT EPISODE, IN FULL REMISSION (H): ICD-10-CM

## 2022-06-22 DIAGNOSIS — G47.419 PRIMARY NARCOLEPSY WITHOUT CATAPLEXY: ICD-10-CM

## 2022-06-22 DIAGNOSIS — L81.1 MELASMA: ICD-10-CM

## 2022-06-22 PROCEDURE — 99214 OFFICE O/P EST MOD 30 MIN: CPT | Mod: 95 | Performed by: NURSE PRACTITIONER

## 2022-06-22 RX ORDER — DEXTROAMPHETAMINE SACCHARATE, AMPHETAMINE ASPARTATE, DEXTROAMPHETAMINE SULFATE AND AMPHETAMINE SULFATE 5; 5; 5; 5 MG/1; MG/1; MG/1; MG/1
TABLET ORAL
Qty: 90 TABLET | Refills: 0 | Status: SHIPPED | OUTPATIENT
Start: 2022-08-17 | End: 2022-09-19

## 2022-06-22 RX ORDER — LAMOTRIGINE 150 MG/1
150 TABLET ORAL DAILY
Qty: 90 TABLET | Refills: 1 | Status: SHIPPED | OUTPATIENT
Start: 2022-06-22 | End: 2022-09-19

## 2022-06-22 RX ORDER — DEXTROAMPHETAMINE SACCHARATE, AMPHETAMINE ASPARTATE, DEXTROAMPHETAMINE SULFATE AND AMPHETAMINE SULFATE 5; 5; 5; 5 MG/1; MG/1; MG/1; MG/1
TABLET ORAL
Qty: 90 TABLET | Refills: 0 | Status: SHIPPED | OUTPATIENT
Start: 2022-06-22 | End: 2022-09-19

## 2022-06-22 RX ORDER — DEXTROAMPHETAMINE SACCHARATE, AMPHETAMINE ASPARTATE, DEXTROAMPHETAMINE SULFATE AND AMPHETAMINE SULFATE 5; 5; 5; 5 MG/1; MG/1; MG/1; MG/1
TABLET ORAL
Qty: 90 TABLET | Refills: 0 | Status: SHIPPED | OUTPATIENT
Start: 2022-07-20 | End: 2022-09-19

## 2022-06-22 NOTE — NURSING NOTE
No Changes to medications since the last visit per patient.  Will do QNRS on her own.  Mita Jones VF

## 2022-06-22 NOTE — TELEPHONE ENCOUNTER
RN sent refill team a request for this accommodation, patient does have follow up scheduled for October, but prescription has  and patient doesn't want to have to wait until her follow up.    Ninoska SUTTON RN

## 2022-06-22 NOTE — PATIENT INSTRUCTIONS
**For crisis resources, please see the information at the end of this document**   Patient Education    Thank you for coming to the [facility]ALTH [facility] [place] CLINIC.     Lab Testing:  If you had lab testing today and your results are reassuring or normal they will be mailed to you or sent through Snipd within 7 days. If the lab tests need quick action we will call you with the results. The phone number we will call with results is # [phone]. If this is not the best number please call our clinic and change the number.     Medication Refills:  If you need any refills please call your pharmacy and they will contact us. Our fax number for refills is [phone].   Three business days of notice are needed for general medication refill requests.   Five business days of notice are needed for controlled substance refill requests.   If you need to change to a different pharmacy, please contact the new pharmacy directly. The new pharmacy will help you get your medications transferred.     Contact Us:  Please call [phone] during business hours (8-5:00 M-F).   If you have medication related questions after clinic hours, or on the weekend, please call [phone].     Financial Assistance [phone]   Medical Records [phone]       MENTAL HEALTH CRISIS RESOURCES:  For a emergency help, please call 911 or go to the nearest Emergency Department.     Emergency Walk-In Options:   Em[facility] Unit @ [facility] ([place]): [phone] - Specialized mental health emergency area designed to be calming  [facility] ([place]): [phone]  [facility] ([place]): [phone]  [facility]): [phone]    [facility] Crisis Information:   [place]: [phone]  [place]: [phone]  [place] ([place]) - Adult: [phone]     Child: [phone]  [place] - Adult: [phone]     Child: [phone]  [place]:  [phone]  List of all [facility] resources:   https://[place].gov/dhs/people-we-serve/adults/health-care/mental-health/resources/crisis-contacts.jsp    National Crisis Information:   Crisis Text Line: Text  [place]  to 496886  National Suicide Prevention Lifeline: 5-365-608-TALK (1-407.379.8550)       For online chat options, visit https://suicidepreventionlifeline.org/chat/  Poison Control Center: 0-737-506-6478  Trans Lifeline: 3-178-649-4879 - Hotline for transgender people of all ages  The Lb Project: 2-266-978-6755 - Hotline for LGBT youth     For Non-Emergency Support:   Fast Tracker: Mental Health & Substance Use Disorder Resources -   https://www.[place].org/

## 2022-06-22 NOTE — PROGRESS NOTES
"VIDEO VISIT  Lashonda Rodriguez is a 42 year old patient who is being evaluated via a billable video visit.      The patient has been notified of following:   \"This video visit will be conducted via a call between you and your physician/provider. We have found that certain health care needs can be provided without the need for an in-person physical exam. This service lets us provide the care you need with a video conversation. If a prescription is necessary we can send it directly to your pharmacy. If lab work is needed we can place an order for that and you can then stop by our lab to have the test done at a later time. Insurers are generally covering virtual visits as they would in-office visits so billing should not be different than normal.  If for some reason you do get billed incorrectly, you should contact the billing office to correct it and that number is in the AVS .    Video Conference to be completed via:  Paulina    Patient has given verbal consent for video visit?:  Yes    Patient would prefer that any video invitations be sent by: Send to e-mail at: [email or web address]      How would patient like to obtain AVS?:  [facility]    AVS SmartPhrase [PsychAVS] has been placed in 'Patient Instructions':  Yes     Video- Visit Details  Type of service:  video visit for medication management  Time of service:    Date:  06/22/2022    Video Start Time: 7:34a     Video End Time: 8:01a    Reason for video visit:  Patient has requested telehealth visit  Originating Site (patient location):  [facility]   Location- Patient's home  Distant Site (provider location):  Remote location  Mode of Communication:  Video Conference via AmWell  Consent:  Patient has given verbal consent for video visit?: Yes        [facility]  Psychiatry Clinic  PSYCHIATRIC PROGRESS NOTE  telemedicine       Lashonda Rodriguez is a 42 year old female who prefers the name Josi and pronouns she, her, hers, " "herself.  Therapist: couples counseling as needed  PCP: Selma Alba  Other Providers: None     PREVIOUS PSYCH MED TRIALS:  - Provigil (limited effect, 2-3 month trial)  - Nuvigil 250mg (trialed in 2013-ineffective)  - buspar (unknown)  - Wellbutrin XL (taken with Adderall, two seizures in 2009/2010)  - AMT 10mg for skin picking (unknown)  - NAC (ineffective, unsure about her compliance)     Pertinent Background:  See previous notes.  Psych critical item history includes [no critical items].      Interim History                                                                                                        4, 4      The patient is a good historian, reports good treatment adherence and was last seen 11/22/2021 when she chose to continue lamotrigine 150mg daily, Adderall 40mg QAM, 20mg Qnoon.    Since the last visit, she's been OK, \"my mood has been pretty challenging in the last month. We have a dog who isn't very friendly. We're working on stuff in couples counseling\".  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, practicing for a couple upcoming shows     Recent Symptoms:   Depression: recent intermittent anhedonia, low energy, sadness re: her dog  Anxiety: resolving worry for decisions at home, stress with a big work projects, less frequent skin picking     ADVERSE EFFECTS: none  MEDICAL CONCERNS: none today     APPETITE: OK, 165# in June " 2022, eats vegan   SLEEP: with melatonin 10mg, was sleeping restlessly before getting a new pillow     Substance Use:  Alcohol- limits, prefers craft beer  Caffeine- takes No Doze with Adderall for narcolepsy     Per 9/14/2016 note: history of alcohol, cannabis, meth abuse, treatment about 16yo in 1997 for cannabis        Social/ Family History                                  [per patient report]                                 1ea,1ea      FINANCIAL SUPPORT- 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 2003, Leticia b. 2002)       LIVING SITUATION- lives with  Tono (m. 2015) and two daughters      LEGAL- None  EARLY HISTORY/ EDUCATION- Grew up in [place] with half siblings. BS in Biology from [employer]  SOCIAL/ SPIRITUAL SUPPORT- support from family, friends       CULTURAL INFLUENCES/ IMPACT- UNKNOWN       TRAUMA HISTORY- None  FEELS SAFE AT HOME- Yes  FAMILY HISTORY-  daughter Leticia- sertraline for anxiety    Medical / Surgical History                                 Patient Active Problem List   Diagnosis     Major depressive disorder, recurrent episode (H)     Narcolepsy     Bladder spasms     Exercise-induced asthma     CARDIOVASCULAR SCREENING; LDL GOAL LESS THAN 160     Impulse control disorder     Attention deficit disorder     Hx of psychiatric care     Melasma       Past Surgical History:   Procedure Laterality Date     ECTOPIC PREGNANCY SURGERY       GYN SURGERY  ectopic pregnancy     HC TOOTH EXTRACTION W/FORCEP        Medical Review of Systems         [2,10]     Pregnant- No    Breastfeeding- No    Contraception- IUD  A comprehensive review of systems was performed and is negative other than noted in the HPI.  Denies head trauma, LOC.      While taking Wellbutrin, she reports history of general tonic clonic seizure 9/20/2009 without aura with postictal confusion; second seizure while with SO on 1/23/10 (tonic clonic; foaming at the  mouth with post ictal confusion and lethargy. Narcolepsy diagnosed about 2004. Remote history of febrile convulsion before [age] yo, her daughter has a history of febrile convulsions.     Allergy    Wellbutrin [bupropion] and Pcn [penicillins]     Wellbutrin- two seizures while taking Wellbutrin and Adderall    Current Medications        Current Outpatient Medications   Medication Sig Dispense Refill     acetaminophen (TYLENOL) 500 MG tablet take 1-2 Tabs by mouth every 6 hours as needed.       amphetamine-dextroamphetamine (ADDERALL) 20 MG tablet Take two (2) tabs in the morning and one (1) tab at noon 90 tablet 0     amphetamine-dextroamphetamine (ADDERALL) 20 MG tablet TAKE TWO TABLETS BY MOUTH EVERY MORNING AND ONE TABLET AT NOON 90 tablet 0     amphetamine-dextroamphetamine (ADDERALL) 20 MG tablet Take two (2) tabs in the morning and one (1) tab at noon 90 tablet 0     caffeine (NO-DOZE) 200 MG TABS Take 1 tablet (200 mg) by mouth 2 times daily (Patient taking differently: Take 200 mg by mouth 2 times daily 400 mg qam and 200 qnoon) 150 tablet      cetirizine (ZYRTEC ALLERGY) 10 MG tablet Take 10 mg by mouth daily as needed        Cyanocobalamin (VITAMIN B 12 PO)        diclofenac (VOLTAREN) 75 MG EC tablet Take 1 tablet (75 mg) by mouth 2 times daily 60 tablet 0     fluocin-hydroquinone-tretinoin 0.01-4-0.05 % CREA Twice daily To the face 30 g 2     hydroquinone 4 % CREA Externally apply topically daily In the morning (Patient not taking: Reported on 7/30/2021) 30 g 2     Ibuprofen 200 MG capsule take 200-400 mg by mouth every 6 hours as needed.       lamoTRIgine (LAMICTAL) 150 MG tablet Take 1 tablet (150 mg) by mouth daily 90 tablet 1     melatonin 5 MG tablet Take 10 mg by mouth nightly as needed        Multiple Vitamins-Minerals (MULTIVITAMIN ADULT PO) Take by mouth daily        Vitals         [3, 3]   There were no vitals taken for this visit.   Mental Status Exam        [9, 14 cog gs]     Alertness: alert   and oriented  Appearance: N/A  Behavior/Demeanor: cooperative, pleasant and calm, with N/A eye contact   Speech: normal and regular rate and rhythm  Language: no problems  Psychomotor: N/A  Mood: description consistent with euthymia  Affect: appropriate; was congruent to mood; was congruent to content  Thought Process/Associations: unremarkable  Thought Content:  Reports none;  Denies suicidal ideation, violent ideation, delusions, preoccupations, obsessions , phobia  and magical thinking  Perception:  Reports none;  Denies auditory hallucinations, visual hallucinations, visual distortion seen as shadows , depersonalization and derealization  Insight: fair  Judgment: good  Cognition: (6) does  appear grossly intact; formal cognitive testing was not done  Gait/Station and/or Muscle Strength/Tone: N/A    Labs and Data                          Rating Scales:    N/A    PHQ9 Today:    PHQ 10/4/2018 4/4/2019 10/7/2019   PHQ-9 Total Score 1 0 0   Q9: Thoughts of better off dead/self-harm past 2 weeks Not at all Not at all Not at all     Diagnosis      primary narcolepsy 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 (diagnosed by sleep study in 2007)  recurrent MDD in remission     Assessment      [m2, h3]     Today the following issues were addressed:      : 6/2022- Adderall 20mg #90 last filled by writer on 5/20/2022     PSYCHOTROPIC DRUG INTERACTIONS:  - LAMOTRIGINE, TYLENOL (may result in decreased lamotrigine effectiveness)     Drug Interaction Management: Monitoring for adverse effects, routine vitals, using lowest therapeutic dose of [psychotropics] and patient is aware of risks    Plan                                                                                                                     m2, h3     1) she chooses to continue lamotrigine 150mg daily, Adderall 40mg QAM, 20mg Qnoon      - takes No Doze 200mg BID for narcolepsy     2) active in couples counseling      RTC: 3 months, sooner as needed    CRISIS NUMBERS:   Provided  routinely in AVS.    Treatment Risk Statement:  The patient understands the risks, benefits, adverse effects and alternatives. Agrees to treatment with the capacity to do so. No medical contraindications to treatment. Agrees to call clinic for any problems. The patient understands to call 911 or go to the nearest ED if life threatening or urgent symptoms occur.     WHODAS 2.0  TODAY total score = N/A; [a 12-item WHODAS 2.0 assessment was not completed by the pt today and/or recorded in EPIC].    PROVIDER:  AYSE Dumont CNP

## 2022-06-22 NOTE — TELEPHONE ENCOUNTER
fluocin-hydroquinone-tretinoin 0.01-4-0.05 % CREA      Last Written Prescription Date:  6-6-19  Last Fill Quantity: 30 g,   # refills: 2  Last Office Visit : 6-6-19  Future Office visit:  10-13-22    Routing refill request to provider for review/approval because:  Med not on protocol  Last appt 2019,   Pt requesting RF til RTC- via my chart

## 2022-06-23 NOTE — TELEPHONE ENCOUNTER
Received refill request as MARYANA. Chart (including notes and pertinent labs) reviewed, courtesy 30-day refill provided. Attending Dr. Castellanos CC'd as FYI.     Nadege Harris MD (PGY4)  Dermatology Resident

## 2022-06-24 ENCOUNTER — TELEPHONE (OUTPATIENT)
Dept: DERMATOLOGY | Facility: CLINIC | Age: 42
End: 2022-06-24

## 2022-06-24 NOTE — TELEPHONE ENCOUNTER
PA Initiation    Medication: fluocin-hydroquinone-tretinoin (TRI-ALLEN) 0.01-4-0.05 % CREA   Insurance Company: [employer] - Phone [phone] Fax [phone]  Pharmacy Filling the Rx: [facility] -- [place], [place] - [address]  Filling Pharmacy Phone: [phone]  Filling Pharmacy Fax: [phone]  Start Date: 6/24/2022

## 2022-06-24 NOTE — TELEPHONE ENCOUNTER
Prior Authorization Retail Medication Request    Medication/Dose: fluocin-hydroquinone-tretinoin 0.01-4-0.05 % CREA  ICD code (if different than what is on RX):  Melasma [L81.1]   Previously Tried and Failed:    Rationale:      Insurance Name:  [employer]  Insurance ID:  [id number]

## 2022-06-24 NOTE — TELEPHONE ENCOUNTER
Prior Authorization Approval    Authorization Effective Date: 6/24/2022  Authorization Expiration Date: 9/24/2022  Medication: fluocin-hydroquinone-tretinoin (TRI-ALLEN) 0.01-4-0.05 % CREA--APPROVED  Approved Dose/Quantity:   Reference #:     Insurance Company: [employer] - Phone [phone] Fax [phone]  Expected CoPay:       CoPay Card Available:      Foundation Assistance Needed:    Which Pharmacy is filling the prescription (Not needed for infusion/clinic administered): [facility] -- [place] - [place], [place] - [address]  Pharmacy Notified: Yes  Patient Notified: Yes **Instructed pharmacy to notify patient when script is ready to /ship.**    Contacted insurance plan to find out more information regarding message in cover my meds.  Per rep notes state override was placed for 3 months.

## 2022-07-01 ENCOUNTER — THERAPY VISIT (OUTPATIENT)
Dept: PHYSICAL THERAPY | Facility: CLINIC | Age: 42
End: 2022-07-01
Attending: FAMILY MEDICINE
Payer: COMMERCIAL

## 2022-07-01 DIAGNOSIS — M75.82 ROTATOR CUFF TENDINITIS, LEFT: ICD-10-CM

## 2022-07-01 PROCEDURE — 97161 PT EVAL LOW COMPLEX 20 MIN: CPT | Mod: GP | Performed by: PHYSICAL THERAPIST

## 2022-07-01 PROCEDURE — 97110 THERAPEUTIC EXERCISES: CPT | Mod: GP | Performed by: PHYSICAL THERAPIST

## 2022-07-01 NOTE — PROGRESS NOTES
Physical Therapy Initial Evaluation  Subjective:  The history is provided by the patient. No  was used.   Patient Health History  Lashonda Rodriguez being seen for L shoulder pain, rotator cuff tendinitis, low grade tear of supraspinatus.     Problem began: 5/20/2022.   Problem occurred: lifting weights   Pain is reported as 0/10 on pain scale.  General health as reported by patient is good.  Pertinent medical history includes: sleep disorder/apnea and numbness/tingling.   Red flags:  None as reported by patient.  Medical allergies: none.   Surgeries include:  None.    Current medications:  Other. Other medications details: Narcolepsy meds.    Current occupation is Purchasing/supply chain.   Primary job tasks include:  Computer work and prolonged sitting.                  Therapist Generated HPI Evaluation         Type of problem:  Left shoulder.    This is a new condition.  Condition occurred with:  While carrying an object and lifting.  Where condition occurred: during recreation/sport.  Patient reports pain:  Anterior (superior).  Pain is described as sharp and aching and is intermittent.  Radiates to: none. Pain timing: worse with activity.  Since onset symptoms are gradually improving.  Associated symptoms:  Painful arc, numbness, tingling, loss of strength and loss of motion/stiffness (numbness and tingling has resolved mostly). Symptoms are exacerbated by using arm behind back and using arm overhead (moving arm off to side)  Relieved by: has not tried much, unsure.  Special tests included:  MRI (low grade tear of supraspinatus).  There was none improvement following previous treatment.  Restrictions due to condition include:  Working in normal job without restrictions.  Barriers include:  None as reported by patient.                        Objective:  System                   Shoulder Evaluation:  ROM:  AROM:    Flexion:  Left:  180        Abduction:  Left: 160       Internal Rotation:   Left:  Base of scapula                            Strength:    Flexion: Left:5/5   Pain:      Extension:  Left: 5/5    Pain:      Abduction:  Left: 4/5   Pain:++        Internal Rotation:  Left:4/5      Pain:+      External Rotation:   Left:5/5     Pain:         Elbow Flexion:  Left:5/5     Pain:      Elbow Extension:  Left:5/5     Pain:      Stability Testing:  normal      Special Tests:    Left shoulder positive for the following special tests:  Impingement  Left shoulder negative for the following special tests:  Rotator cuff tear    Palpation:    Left shoulder tenderness present at:  Supraspinatus; Infraspinatus and Teres Minor  Left shoulder tenderness not present at: Subscapularis or Deltoid    Mobility Tests:  normal                                                 General     ROS    Assessment/Plan:    Patient is a 42 year old female with left side shoulder complaints.    Patient has the following significant findings with corresponding treatment plan.                Diagnosis 1:  L shoulder pain  Pain -  hot/cold therapy, manual therapy, self management, education and home program  Decreased ROM/flexibility - manual therapy, therapeutic exercise and home program  Decreased joint mobility - manual therapy, therapeutic exercise and home program  Decreased strength - therapeutic exercise, therapeutic activities and home program  Impaired muscle performance - neuro re-education and home program  Impaired posture - neuro re-education and home program    Therapy Evaluation Codes:   Cumulative Therapy Evaluation is: Low complexity.    Previous and current functional limitations:  (See Goal Flow Sheet for this information)    Short term and Long term goals: (See Goal Flow Sheet for this information)     Communication ability:  Patient appears to be able to clearly communicate and understand verbal and written communication and follow directions correctly.  Treatment Explanation - The following has been discussed  with the patient:   RX ordered/plan of care  Anticipated outcomes  Possible risks and side effects  This patient would benefit from PT intervention to resume normal activities.   Rehab potential is good.    Frequency:  2 X a month, once daily  Duration:  for 3 months  Discharge Plan:  Achieve all LTG.  Independent in home treatment program.  Reach maximal therapeutic benefit.    Please refer to the daily flowsheet for treatment today, total treatment time and time spent performing 1:1 timed codes.

## 2022-09-19 ENCOUNTER — VIRTUAL VISIT (OUTPATIENT)
Dept: PSYCHIATRY | Facility: CLINIC | Age: 42
End: 2022-09-19
Attending: NURSE PRACTITIONER
Payer: COMMERCIAL

## 2022-09-19 DIAGNOSIS — F33.42 MAJOR DEPRESSIVE DISORDER, RECURRENT EPISODE, IN FULL REMISSION (H): ICD-10-CM

## 2022-09-19 DIAGNOSIS — G47.419 PRIMARY NARCOLEPSY WITHOUT CATAPLEXY: ICD-10-CM

## 2022-09-19 PROCEDURE — 99214 OFFICE O/P EST MOD 30 MIN: CPT | Mod: 95 | Performed by: NURSE PRACTITIONER

## 2022-09-19 RX ORDER — DEXTROAMPHETAMINE SACCHARATE, AMPHETAMINE ASPARTATE, DEXTROAMPHETAMINE SULFATE AND AMPHETAMINE SULFATE 5; 5; 5; 5 MG/1; MG/1; MG/1; MG/1
TABLET ORAL
Qty: 90 TABLET | Refills: 0 | Status: SHIPPED | OUTPATIENT
Start: 2022-09-19 | End: 2022-09-22

## 2022-09-19 RX ORDER — LAMOTRIGINE 150 MG/1
150 TABLET ORAL DAILY
Qty: 90 TABLET | Refills: 1 | Status: SHIPPED | OUTPATIENT
Start: 2022-09-19 | End: 2023-01-05

## 2022-09-19 RX ORDER — DEXTROAMPHETAMINE SACCHARATE, AMPHETAMINE ASPARTATE, DEXTROAMPHETAMINE SULFATE AND AMPHETAMINE SULFATE 5; 5; 5; 5 MG/1; MG/1; MG/1; MG/1
TABLET ORAL
Qty: 90 TABLET | Refills: 0 | Status: SHIPPED | OUTPATIENT
Start: 2022-11-14 | End: 2023-01-05

## 2022-09-19 RX ORDER — DEXTROAMPHETAMINE SACCHARATE, AMPHETAMINE ASPARTATE, DEXTROAMPHETAMINE SULFATE AND AMPHETAMINE SULFATE 5; 5; 5; 5 MG/1; MG/1; MG/1; MG/1
TABLET ORAL
Qty: 90 TABLET | Refills: 0 | Status: SHIPPED | OUTPATIENT
Start: 2022-10-17 | End: 2022-10-21

## 2022-09-19 ASSESSMENT — PATIENT HEALTH QUESTIONNAIRE - PHQ9
SUM OF ALL RESPONSES TO PHQ QUESTIONS 1-9: 2
SUM OF ALL RESPONSES TO PHQ QUESTIONS 1-9: 2
10. IF YOU CHECKED OFF ANY PROBLEMS, HOW DIFFICULT HAVE THESE PROBLEMS MADE IT FOR YOU TO DO YOUR WORK, TAKE CARE OF THINGS AT HOME, OR GET ALONG WITH OTHER PEOPLE: NOT DIFFICULT AT ALL

## 2022-09-19 NOTE — PATIENT INSTRUCTIONS
**For crisis resources, please see the information at the end of this document**   Patient Education    Thank you for coming to the [facility].     Lab Testing:  If you had lab testing today and your results are reassuring or normal they will be mailed to you or sent through Gertrude within 7 days. If the lab tests need quick action we will call you with the results. The phone number we will call with results is # [phone]. If this is not the best number please call our clinic and change the number.     Medication Refills:  If you need any refills please call your pharmacy and they will contact us. Our fax number for refills is [phone].   Three business days of notice are needed for general medication refill requests.   Five business days of notice are needed for controlled substance refill requests.   If you need to change to a different pharmacy, please contact the new pharmacy directly. The new pharmacy will help you get your medications transferred.     Contact Us:  Please call [phone] during business hours (8-5:00 M-F).   If you have medication related questions after clinic hours, or on the weekend, please call [phone].     Financial Assistance [phone]   Medical Records [phone]       MENTAL HEALTH CRISIS RESOURCES:  For a emergency help, please call 911 or go to the nearest Emergency Department.     Emergency Walk-In Options:   Em[facility] Unit @ [facility] ([place]): [phone] - Specialized mental health emergency area designed to be calming  [facility] ([place]): [phone]  [facility] ([place]): [phone]  [facility]): [phone]    [facility] Crisis Information:   [place]: [phone]  [place]: [phone]  [place] ([place]) - Adult: [phone]     Child: [phone]  [place] - Adult: [phone]     Child: [phone]  [place]:  [phone]  List of all [facility] resources:   https://[place].gov/dhs/people-we-serve/adults/health-care/mental-health/resources/crisis-contacts.jsp    National Crisis Information:   Crisis Text Line: Text  [place]  to 212343  Suicide & Crisis Lifeline: 988  National Suicide Prevention Lifeline: 3-220-529-TALK (1-340.341.9794)       For online chat options, visit https://suicidepreventionlifeline.org/chat/  Poison Control Center: 3-294-395-0667  Trans Lifeline: 7-005-300-9913 - Hotline for transgender people of all ages  The Lb Project: 5-538-529-6099 - Hotline for LGBT youth     For Non-Emergency Support:   Fast Tracker: Mental Health & Substance Use Disorder Resources -   https://www.[place].org/

## 2022-09-19 NOTE — PROGRESS NOTES
"VIDEO VISIT  Lashonda Rodriguez is a 42 year old patient who is being evaluated via a billable video visit.      The patient has been notified of following:   \"This video visit will be conducted via a call between you and your physician/provider. We have found that certain health care needs can be provided without the need for an in-person physical exam. This service lets us provide the care you need with a video conversation. If a prescription is necessary we can send it directly to your pharmacy. If lab work is needed we can place an order for that and you can then stop by our lab to have the test done at a later time. Insurers are generally covering virtual visits as they would in-office visits so billing should not be different than normal.  If for some reason you do get billed incorrectly, you should contact the billing office to correct it and that number is in the AVS .    Video Conference to be completed via:  Paulina    Patient has given verbal consent for video visit?:  Yes    Patient would prefer that any video invitations be sent by: Send to e-mail at: [email or web address]      How would patient like to obtain AVS?:  [employer]    AVS SmartPhrase [PsychAVS] has been placed in 'Patient Instructions':  Yes     Video- Visit Details  Type of service:  video visit for medication management  Time of service:    Date:  09/19/2022    Video Start Time: 8:01a     Video End Time: 8:22a    Reason for video visit:  Patient has requested telehealth visit  Originating Site (patient location):  [facility]   Location- Patient's home  Distant Site (provider location):  Remote location  Mode of Communication:  Video Conference via [employer]Well  Consent:  Patient has given verbal consent for video visit?: Yes        [facility]  Psychiatry Clinic  PSYCHIATRIC PROGRESS NOTE  telemedicine       Lashonda Rodriguez is a 42 year old female who prefers the name Josi and pronouns she, her, hers, " herself.  Therapist: couples counseling as needed  PCP: Selma Alba  Other Providers: None     PREVIOUS PSYCH MED TRIALS:  - Provigil (limited effect, 2-3 month trial)  - Nuvigil 250mg (trialed in 2013-ineffective)  - buspar (unknown)  - Wellbutrin XL (taken with Adderall, two seizures in 2009/2010)  - AMT 10mg for skin picking (unknown)  - NAC (ineffective, unsure about her compliance)     Pertinent Background:  See previous notes.  Psych critical item history includes [no critical items].      Interim History                                                                                                        4, 4      The patient is a good historian, reports good treatment adherence.    Last seen 6/22/2022 when she chose to continue lamotrigine 150mg daily, Adderall 40mg QAM, 20mg Qnoon.    Since the last visit, she's been pretty good.   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 2003, Leticia b. 2002)       LIVING SITUATION- lives with  Tono (m. 2015) and two daughters      LEGAL- None  EARLY HISTORY/ EDUCATION- Grew up in [place] with half siblings. BS in Biology from [employer]  SOCIAL/ SPIRITUAL SUPPORT- support from family, friends       CULTURAL INFLUENCES/ IMPACT- UNKNOWN       TRAUMA HISTORY- None  FEELS SAFE AT HOME- Yes  FAMILY HISTORY-  daughter Leticia- sertraline for anxiety    Medical / Surgical History                                 Patient Active Problem List   Diagnosis     Major depressive disorder, recurrent episode (H)     Narcolepsy     Bladder spasms     Exercise-induced asthma     CARDIOVASCULAR SCREENING; LDL GOAL LESS THAN 160     Impulse control disorder     Attention deficit disorder     Hx of psychiatric care     Melasma     Rotator cuff tendinitis, left       Past Surgical History:   Procedure Laterality Date     ECTOPIC PREGNANCY SURGERY       GYN SURGERY  ectopic pregnancy     HC TOOTH EXTRACTION W/FORCEP        Medical Review of Systems         [2,10]     Pregnant/ breastfeeding- No    Contraception- IUD  A comprehensive review of systems was performed and is negative other than noted in the HPI.    Denies head trauma, LOC.      While taking Wellbutrin, history of general tonic clonic seizure 9/20/2009 without aura with postictal confusion; second seizure while with SO on 1/23/10 (tonic clonic; foaming at the mouth with post ictal confusion and lethargy. Narcolepsy diagnosed about 2004. Remote history of febrile convulsion before 3yo. Her daughter has a history of febrile convulsions.     Allergy    Wellbutrin [bupropion] and Pcn [penicillins]     Wellbutrin- two seizures while taking  Wellbutrin and Adderall    Current Medications        Current Outpatient Medications   Medication Sig Dispense Refill     amphetamine-dextroamphetamine (ADDERALL) 20 MG tablet Take two (2) tabs in the morning and one (1) tab at noon 90 tablet 0     amphetamine-dextroamphetamine (ADDERALL) 20 MG tablet TAKE TWO TABLETS BY MOUTH EVERY MORNING AND ONE TABLET AT NOON 90 tablet 0     amphetamine-dextroamphetamine (ADDERALL) 20 MG tablet Take two (2) tabs in the morning and one (1) tab at noon 90 tablet 0     caffeine (NO-DOZE) 200 MG TABS Take 1 tablet (200 mg) by mouth 2 times daily (Patient taking differently: Take 200 mg by mouth 2 times daily 400 mg qam and 200 qnoon) 150 tablet      Cyanocobalamin (VITAMIN B 12 PO)        diclofenac (VOLTAREN) 75 MG EC tablet Take 1 tablet (75 mg) by mouth 2 times daily 60 tablet 0     fluocin-hydroquinone-tretinoin 0.01-4-0.05 % CREA Apply twice daily to the face. Courtesy one time refill as patient not seen in 3 years. Please keep 10-13-22 clinic appt for refills. 30 g 0     lamoTRIgine (LAMICTAL) 150 MG tablet Take 1 tablet (150 mg) by mouth daily 90 tablet 1     melatonin 5 MG tablet Take 10 mg by mouth nightly as needed        Multiple Vitamins-Minerals (MULTIVITAMIN ADULT PO) Take by mouth daily        acetaminophen (TYLENOL) 500 MG tablet take 1-2 Tabs by mouth every 6 hours as needed.       cetirizine (ZYRTEC ALLERGY) 10 MG tablet Take 10 mg by mouth daily as needed        hydroquinone 4 % CREA Externally apply topically daily In the morning (Patient not taking: Reported on 7/30/2021) 30 g 2     Ibuprofen 200 MG capsule take 200-400 mg by mouth every 6 hours as needed.       Vitals         [3, 3]   There were no vitals taken for this visit.   Mental Status Exam        [9, 14 cog gs]     Alertness: alert  and oriented  Appearance: N/A  Behavior/Demeanor: cooperative, pleasant and calm, with N/A eye contact   Speech: normal and regular rate and rhythm  Language: no  problems  Psychomotor: N/A  Mood: description consistent with euthymia  Affect: appropriate; was congruent to mood; was congruent to content  Thought Process/Associations: unremarkable  Thought Content:  Reports none;  Denies suicidal ideation, violent ideation, delusions, preoccupations, obsessions , phobia  and magical thinking  Perception:  Reports none;  Denies auditory hallucinations, visual hallucinations, visual distortion seen as shadows , depersonalization and derealization  Insight: fair  Judgment: good  Cognition: (6) does  appear grossly intact; formal cognitive testing was not done  Gait/Station and/or Muscle Strength/Tone: N/A    Labs and Data                          Rating Scales:      Answers for HPI/ROS submitted by the patient on 9/19/2022  If you checked off any problems, how difficult have these problems made it for you to do your work, take care of things at home, or get along with other people?: Not difficult at all  PHQ9 TOTAL SCORE: 2    PHQ9 Today:    PHQ 4/4/2019 10/7/2019 9/19/2022   PHQ-9 Total Score 0 0 2   Q9: Thoughts of better off dead/self-harm past 2 weeks Not at all Not at all Not at all     Diagnosis      primary narcolepsy 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 (diagnosed by sleep study in 2007)  recurrent MDD in remission     Assessment      [m2, h3]     Today the following issues were addressed:      : 9/2022- Adderall 20mg #90 last filled on 8/18/2022     PSYCHOTROPIC DRUG INTERACTIONS:  - LAMOTRIGINE, TYLENOL (may result in decreased lamotrigine effectiveness)     Drug Interaction Management: Monitoring for adverse effects, routine vitals, using lowest therapeutic dose of [psychotropics] and patient is aware of risks    Plan                                                                                                                     m2, h3     1) she chooses to continue lamotrigine 150mg daily, Adderall 40mg QAM, 20mg Qnoon      - takes No Doze 200mg BID for narcolepsy     2) active in  couples counseling      RTC: 4 months, sooner as needed    CRISIS NUMBERS:   Provided routinely in AVS.    Treatment Risk Statement:  The patient understands the risks, benefits, adverse effects and alternatives. Agrees to treatment with the capacity to do so. No medical contraindications to treatment. Agrees to call clinic for any problems. The patient understands to call 911 or go to the nearest ED if life threatening or urgent symptoms occur.     WHODAS 2.0  TODAY total score = N/A; [a 12-item WHODAS 2.0 assessment was not completed by the pt today and/or recorded in EPIC].    PROVIDER:  AYSE Dumont CNP

## 2022-09-22 DIAGNOSIS — G47.419 PRIMARY NARCOLEPSY WITHOUT CATAPLEXY: ICD-10-CM

## 2022-09-22 RX ORDER — DEXTROAMPHETAMINE SACCHARATE, AMPHETAMINE ASPARTATE, DEXTROAMPHETAMINE SULFATE AND AMPHETAMINE SULFATE 5; 5; 5; 5 MG/1; MG/1; MG/1; MG/1
TABLET ORAL
Qty: 90 TABLET | Refills: 0 | Status: SHIPPED | OUTPATIENT
Start: 2022-09-22 | End: 2022-10-21

## 2022-09-22 NOTE — TELEPHONE ENCOUNTER
Health Call Center    Phone Message    May a detailed message be left on voicemail: yes     Reason for Call: Medication Question or concern regarding medication   Prescription Clarification  Name of Medication: Adderall  Prescribing Provider: Kurt   Pharmacy:    [facility] -- [place], [place] - [address]     What on the order needs clarification?     Pharmacy is unable to fill rx due to supply chain issues and told patient to call her care team to find somewhere else to get it filled.           Action Taken: Other: nursing pool    Travel Screening: Not Applicable

## 2022-09-22 NOTE — TELEPHONE ENCOUNTER
Per  Adderall last refilled: 8/18 #90, 7/20 #90, 6/22 #90    Writer reached out to [facility] pharmacy and confirmed they have amphetamine-dextroamphetamine (ADDERALL) 20 MG tablet in stock. They requested a new script be sent to the pharmacy. Will reach out to patient to confirm the preference.     Writer reached out to patient who agreed to get her Adderall 20 mg filled at [facility] pharmacy. Writer pended rx and routed to provider to sign off. Once the script is signed, will call [facility] pharmacy and discontinue the old script.

## 2022-09-22 NOTE — TELEPHONE ENCOUNTER
- Meds refilled by provider at preferred pharmacy   - Med tab changed to reflect this   - Patient notified     Placed a call to [facility] -- [facility] - [place], [place] - [address] and spoke with pharmacist, Arthur who agreed to discontinue Adderall script with start date of 9/19.     No further action needed by this writer

## 2022-10-05 ENCOUNTER — E-VISIT (OUTPATIENT)
Dept: URGENT CARE | Facility: URGENT CARE | Age: 42
End: 2022-10-05
Payer: COMMERCIAL

## 2022-10-05 DIAGNOSIS — N39.0 ACUTE UTI (URINARY TRACT INFECTION): Primary | ICD-10-CM

## 2022-10-05 PROCEDURE — 99421 OL DIG E/M SVC 5-10 MIN: CPT | Performed by: NURSE PRACTITIONER

## 2022-10-05 RX ORDER — NITROFURANTOIN 25; 75 MG/1; MG/1
100 CAPSULE ORAL 2 TIMES DAILY
Qty: 14 CAPSULE | Refills: 0 | Status: SHIPPED | OUTPATIENT
Start: 2022-10-05 | End: 2022-10-12

## 2022-10-05 NOTE — PATIENT INSTRUCTIONS
Dear Lashonda Rodriguez    After reviewing your responses, I've been able to diagnose you with a urinary tract infection, which is a common infection of the bladder with bacteria.  This is not a sexually transmitted infection, though urinating immediately after intercourse can help prevent infections.  Drinking lots of fluids is also helpful to clear your current infection and prevent the next one.      I have sent a prescription for antibiotics to your pharmacy to treat this infection.    It is important that you take all of your prescribed medication even if your symptoms are improving after a few doses.  Taking all of your medicine helps prevent the symptoms from returning.     If your symptoms worsen, you develop pain in your back or stomach, develop fevers, or are not improving in 5 days, please contact your primary care provider for an appointment or visit any of our convenient Walk-in or Urgent Care Centers to be seen, which can be found on our website here.    Thanks again for choosing us as your health care partner,    AYSE Connors CNP    Urinary Tract Infections in Women  Urinary tract infections (UTIs) are most often caused by bacteria. These bacteria enter the urinary tract. The bacteria may come from inside the body. Or they may travel from the skin outside the rectum or vagina into the urethra. Female anatomy makes it easy for bacteria from the bowel to enter a woman s urinary tract, which is the most common source of UTI. This means women develop UTIs more often than men. Pain in or around the urinary tract is a common UTI symptom. But the only way to know for sure if you have a UTI for the healthcare provider to test your urine. The two tests that may be done are the urinalysis and urine culture.     Types of UTIs    Cystitis. A bladder infection (cystitis) is the most common UTI in women. You may have urgent or frequent need to pee. You may also have pain, burning when you pee, and  bloody urine.    Urethritis. This is an inflamed urethra, which is the tube that carries urine from the bladder to outside the body. You may have lower stomach or back pain. You may also have urgent or frequent need to pee.    Pyelonephritis. This is a kidney infection. If not treated, it can be serious and damage your kidneys. In severe cases, you may need to stay in the hospital. You may have a fever and lower back pain.    Medicines to treat a UTI  Most UTIs are treated with antibiotics. These kill the bacteria. The length of time you need to take them depends on the type of infection. It may be as short as 3 days. If you have repeated UTIs, you may need a low-dose antibiotic for several months. Take antibiotics exactly as directed. Don t stop taking them until all of the medicine is gone. If you stop taking the antibiotic too soon, the infection may not go away. You may also develop a resistance to the antibiotic. This can make it much harder to treat.   Lifestyle changes to treat and prevent UTIs   The lifestyle changes below will help get rid of your UTI. They may also help prevent future UTIs.     Drink plenty of fluids. This includes water, juice, or other caffeine-free drinks. Fluids help flush bacteria out of your body.    Empty your bladder. Always empty your bladder when you feel the urge to pee. And always pee before going to sleep. Urine that stays in your bladder can lead to infection. Try to pee before and after sex as well.    Practice good personal hygiene. Wipe yourself from front to back after using the toilet. This helps keep bacteria from getting into the urethra.    Use condoms during sex. These help prevent UTIs caused by sexually transmitted bacteria. Also don't use spermicides during sex. These can increase the risk for UTIs. Choose other forms of birth control instead. For women who tend to get UTIs after sex, a low-dose of a preventive antibiotic may be used. Be sure to discuss this  option with your healthcare provider.    Follow up with your healthcare provider as directed. He or she may test to make sure the infection has cleared. If needed, more treatment may be started.  Doe last reviewed this educational content on 7/1/2019 2000-2021 [employer]. All rights reserved. This information is not intended as a substitute for professional medical care. Always follow your healthcare professional's instructions.

## 2022-10-13 ENCOUNTER — OFFICE VISIT (OUTPATIENT)
Dept: DERMATOLOGY | Facility: CLINIC | Age: 42
End: 2022-10-13
Payer: COMMERCIAL

## 2022-10-13 ENCOUNTER — E-VISIT (OUTPATIENT)
Dept: URGENT CARE | Facility: CLINIC | Age: 42
End: 2022-10-13

## 2022-10-13 DIAGNOSIS — L57.8 DIFFUSE PHOTODAMAGE OF SKIN: Primary | ICD-10-CM

## 2022-10-13 DIAGNOSIS — L81.1 MELASMA: ICD-10-CM

## 2022-10-13 DIAGNOSIS — R30.0 DIFFICULT OR PAINFUL URINATION: Primary | ICD-10-CM

## 2022-10-13 PROCEDURE — 99207 PR NON-BILLABLE SERV PER CHARTING: CPT | Performed by: NURSE PRACTITIONER

## 2022-10-13 PROCEDURE — 99204 OFFICE O/P NEW MOD 45 MIN: CPT | Performed by: DERMATOLOGY

## 2022-10-13 ASSESSMENT — PAIN SCALES - GENERAL: PAINLEVEL: NO PAIN (0)

## 2022-10-13 NOTE — PROGRESS NOTES
DERMATOLOGY NEW VISIT     CHIEF COMPLAINT:  Recurring melasma.    HISTORY OF PRESENT ILLNESS:  Josi is a very pleasant, 42-year-old female who is technically a new patient to our clinic today, as she has not been seen in the last 3 years.  She was previously seen in our clinic on 06/06/2019, at which time she had been doing well with respect to facial melasma on Tri-Celia cream applied twice daily.  Today she reports that she is out of this prescription and would like to restart it.  She only uses it inconsistently and is not using it twice a day every day.  She has not had any problems with hyperpigmentation or other local side effects.  In addition, she is interested in consulting with a cosmetic dermatologist regarding laser resurfacing options for her face.    REVIEW OF SYSTEMS:  No recent fevers.    PHYSICAL EXAMINATION:    GENERAL:  This is a well-appearing, well-nourished female with a normal mood and affect who is oriented x3.  SKIN:  A cutaneous exam of the head and neck was performed and demonstrates light-tan macular hyperpigmentation affecting the convexities of the cheeks and outer lateral forehead.    ASSESSMENT AND PLAN:    1.  Persistent melasma.  Today we refilled her prescription for Tri-Celia cream to be applied twice daily as needed.  We discussed the potential risk of paradoxical hyperpigmentation with long-term usage and recommended that she take breaks if she is using this medication consistently, with a holiday off the medication every 2-3 months.  We also reviewed the importance of careful sun protection.  2.  Cosmetic concerns, including photodamage and dynamic rhytides.  We gave her a referral to our Cosmetic Dermatology team for a laser consult.  3.  She will follow up in our clinic on an as-needed basis.      Mike Castellanos MD  Dermatology Attending

## 2022-10-13 NOTE — LETTER
10/13/2022       RE: Lashonda Rodriguez  [address]  [facility] [id number]     Dear Colleague,    Thank you for referring your patient, Lashonda Rodriguez, to the [facility] [place] at [facility]. Please see a copy of my visit note below.    DERMATOLOGY NEW VISIT     CHIEF COMPLAINT:  Recurring melasma.    HISTORY OF PRESENT ILLNESS:  Josi is a very pleasant, 42-year-old female who is technically a new patient to our clinic today, as she has not been seen in the last 3 years.  She was previously seen in our clinic on 06/06/2019, at which time she had been doing well with respect to facial melasma on Tri-Celia cream applied twice daily.  Today she reports that she is out of this prescription and would like to restart it.  She only uses it inconsistently and is not using it twice a day every day.  She has not had any problems with hyperpigmentation or other local side effects.  In addition, she is interested in consulting with a cosmetic dermatologist regarding laser resurfacing options for her face.    REVIEW OF SYSTEMS:  No recent fevers.    PHYSICAL EXAMINATION:    GENERAL:  This is a well-appearing, well-nourished female with a normal mood and affect who is oriented x3.  SKIN:  A cutaneous exam of the head and neck was performed and demonstrates light-tan macular hyperpigmentation affecting the convexities of the cheeks and outer lateral forehead.    ASSESSMENT AND PLAN:    1.  Persistent melasma.  Today we refilled her prescription for Tri-Celia cream to be applied twice daily as needed.  We discussed the potential risk of paradoxical hyperpigmentation with long-term usage and recommended that she take breaks if she is using this medication consistently, with a holiday off the medication every 2-3 months.  We also reviewed the importance of careful sun protection.  2.  Cosmetic concerns, including photodamage and  dynamic rhytides.  We gave her a referral to our Cosmetic Dermatology team for a laser consult.  3.  She will follow up in our clinic on an as-needed basis.      Mike Castellanos MD  Dermatology Attending            Again, thank you for allowing me to participate in the care of your patient.      Sincerely,    Mike Castellanos MD

## 2022-10-13 NOTE — NURSING NOTE
Dermatology Rooming Note    Lashonda Rodriguez's goals for this visit include:   Chief Complaint   Patient presents with     Derm Problem     Josi is here today for medication refill for melasma      MAGDALENA Rangel

## 2022-10-13 NOTE — LETTER
Date:November 7, 2022      Patient was self referred, no letter generated. Do not send.        [facility] Health Information

## 2022-10-14 NOTE — PATIENT INSTRUCTIONS
Dear Lashonda Rodriguez,    We are sorry you are not feeling well. Based on the responses you provided, it is recommended that you be seen in-person in urgent care so we can better evaluate your symptoms. Please click here to find the nearest urgent care location to you.   You will not be charged for this Visit. Thank you for trusting us with your care.    Sara Hernandez, CNP

## 2022-10-21 ENCOUNTER — MYC REFILL (OUTPATIENT)
Dept: PSYCHIATRY | Facility: CLINIC | Age: 42
End: 2022-10-21

## 2022-10-21 DIAGNOSIS — G47.419 PRIMARY NARCOLEPSY WITHOUT CATAPLEXY: ICD-10-CM

## 2022-10-21 RX ORDER — DEXTROAMPHETAMINE SACCHARATE, AMPHETAMINE ASPARTATE, DEXTROAMPHETAMINE SULFATE AND AMPHETAMINE SULFATE 5; 5; 5; 5 MG/1; MG/1; MG/1; MG/1
TABLET ORAL
Qty: 90 TABLET | Refills: 0 | Status: SHIPPED | OUTPATIENT
Start: 2022-10-21 | End: 2022-11-25

## 2022-10-21 NOTE — TELEPHONE ENCOUNTER
- Meds refilled by provider   - Med tab changed to reflect this   - Patient notified via [employer]     Reached out to [facility] -- [facility] - [place], [place] - [address] and spoke with pharmacist, Sara who agreed to discontinue discontinue Adderall rx with start date of 10/17.

## 2022-10-21 NOTE — TELEPHONE ENCOUNTER
Last seen: 9/19  RTC: 4 months  Cancel: none   No-show: none   Next appt: 1/5    Incoming refill from patient via [employer]      Disp Refills Start End SEPIDEH   amphetamine-dextroamphetamine (ADDERALL) 20 MG tablet 90 tablet 0 10/17/2022  No   Sig: TAKE TWO TABLETS BY MOUTH EVERY MORNING AND ONE TABLET AT NOON   Sent to pharmacy as: Amphetamine-Dextroamphetamine 20 MG Oral Tablet (ADDERALL)   Class: E-Prescribe   Earliest Fill Date: 10/17/2022   Order: [id number]   E-Prescribing Status: Receipt confirmed by pharmacy (9/19/2022  8:31 AM CDT)   Per  last refilled: 9/22 #90, 8/18 #90, 7/20 #90    Will route to provider for approval     Once the new rx is sent to [facility] pharmacy, writer will call [facility] -- [place], [place] - [address] and cancel the rx sent with start date of 10/17 for Adderall

## 2022-11-06 PROBLEM — L57.8 DIFFUSE PHOTODAMAGE OF SKIN: Status: ACTIVE | Noted: 2022-11-06

## 2022-11-22 ENCOUNTER — E-VISIT (OUTPATIENT)
Dept: FAMILY MEDICINE | Facility: CLINIC | Age: 42
End: 2022-11-22
Payer: COMMERCIAL

## 2022-11-22 ENCOUNTER — MYC REFILL (OUTPATIENT)
Dept: PSYCHIATRY | Facility: CLINIC | Age: 42
End: 2022-11-22

## 2022-11-22 DIAGNOSIS — N39.0 ACUTE UTI (URINARY TRACT INFECTION): Primary | ICD-10-CM

## 2022-11-22 DIAGNOSIS — G47.419 PRIMARY NARCOLEPSY WITHOUT CATAPLEXY: ICD-10-CM

## 2022-11-22 PROCEDURE — 99421 OL DIG E/M SVC 5-10 MIN: CPT | Performed by: PHYSICIAN ASSISTANT

## 2022-11-22 RX ORDER — NITROFURANTOIN 25; 75 MG/1; MG/1
100 CAPSULE ORAL 2 TIMES DAILY
Qty: 14 CAPSULE | Refills: 0 | Status: SHIPPED | OUTPATIENT
Start: 2022-11-22 | End: 2022-11-29

## 2022-11-22 RX ORDER — DEXTROAMPHETAMINE SACCHARATE, AMPHETAMINE ASPARTATE, DEXTROAMPHETAMINE SULFATE AND AMPHETAMINE SULFATE 5; 5; 5; 5 MG/1; MG/1; MG/1; MG/1
TABLET ORAL
Qty: 90 TABLET | Refills: 0 | OUTPATIENT
Start: 2022-11-22

## 2022-11-22 NOTE — PATIENT INSTRUCTIONS
Dear Lashonda Rodriguez    After reviewing your responses, I've been able to diagnose you with a urinary tract infection, which is a common infection of the bladder with bacteria.  This is not a sexually transmitted infection, though urinating immediately after intercourse can help prevent infections.  Drinking lots of fluids is also helpful to clear your current infection and prevent the next one.      I have sent a prescription for antibiotics to your pharmacy to treat this infection.    It is important that you take all of your prescribed medication even if your symptoms are improving after a few doses.  Taking all of your medicine helps prevent the symptoms from returning.     If your symptoms worsen, you develop pain in your back or stomach, develop fevers, or are not improving in 5 days, please contact your primary care provider for an appointment or visit any of our convenient Walk-in or Urgent Care Centers to be seen, which can be found on our website here.    Thanks again for choosing us as your health care partner,    Maki Hall PA-C    Urinary Tract Infections in Women  Urinary tract infections (UTIs) are most often caused by bacteria. These bacteria enter the urinary tract. The bacteria may come from inside the body. Or they may travel from the skin outside the rectum or vagina into the urethra. Female anatomy makes it easy for bacteria from the bowel to enter a woman s urinary tract, which is the most common source of UTI. This means women develop UTIs more often than men. Pain in or around the urinary tract is a common UTI symptom. But the only way to know for sure if you have a UTI for the healthcare provider to test your urine. The two tests that may be done are the urinalysis and urine culture.     Types of UTIs    Cystitis. A bladder infection (cystitis) is the most common UTI in women. You may have urgent or frequent need to pee. You may also have pain, burning when you pee, and bloody  urine.    Urethritis. This is an inflamed urethra, which is the tube that carries urine from the bladder to outside the body. You may have lower stomach or back pain. You may also have urgent or frequent need to pee.    Pyelonephritis. This is a kidney infection. If not treated, it can be serious and damage your kidneys. In severe cases, you may need to stay in the hospital. You may have a fever and lower back pain.    Medicines to treat a UTI  Most UTIs are treated with antibiotics. These kill the bacteria. The length of time you need to take them depends on the type of infection. It may be as short as 3 days. If you have repeated UTIs, you may need a low-dose antibiotic for several months. Take antibiotics exactly as directed. Don t stop taking them until all of the medicine is gone. If you stop taking the antibiotic too soon, the infection may not go away. You may also develop a resistance to the antibiotic. This can make it much harder to treat.   Lifestyle changes to treat and prevent UTIs   The lifestyle changes below will help get rid of your UTI. They may also help prevent future UTIs.     Drink plenty of fluids. This includes water, juice, or other caffeine-free drinks. Fluids help flush bacteria out of your body.    Empty your bladder. Always empty your bladder when you feel the urge to pee. And always pee before going to sleep. Urine that stays in your bladder can lead to infection. Try to pee before and after sex as well.    Practice good personal hygiene. Wipe yourself from front to back after using the toilet. This helps keep bacteria from getting into the urethra.    Use condoms during sex. These help prevent UTIs caused by sexually transmitted bacteria. Also don't use spermicides during sex. These can increase the risk for UTIs. Choose other forms of birth control instead. For women who tend to get UTIs after sex, a low-dose of a preventive antibiotic may be used. Be sure to discuss this option with  your healthcare provider.    Follow up with your healthcare provider as directed. He or she may test to make sure the infection has cleared. If needed, more treatment may be started.  Doe last reviewed this educational content on 7/1/2019 2000-2021 [employer]. All rights reserved. This information is not intended as a substitute for professional medical care. Always follow your healthcare professional's instructions.

## 2022-11-22 NOTE — TELEPHONE ENCOUNTER
Writer spoke with [facility]. They do have a refill available and are able to fulfill it (not currently on backorder).   Will notify patient via [facility].

## 2022-11-25 DIAGNOSIS — G47.419 PRIMARY NARCOLEPSY WITHOUT CATAPLEXY: ICD-10-CM

## 2022-11-25 RX ORDER — DEXTROAMPHETAMINE SACCHARATE, AMPHETAMINE ASPARTATE, DEXTROAMPHETAMINE SULFATE AND AMPHETAMINE SULFATE 5; 5; 5; 5 MG/1; MG/1; MG/1; MG/1
TABLET ORAL
Qty: 90 TABLET | Refills: 0 | Status: SHIPPED | OUTPATIENT
Start: 2022-11-25 | End: 2022-12-19

## 2022-11-25 NOTE — TELEPHONE ENCOUNTER
Writer called [facility], and refill was not approved by insurance because [facility] processed on 11/22/22.  Called [facility] and confirmed that they did not fill this prescription on 11/22/22. They stated they would reverse the refill through insurance and cancel refill.  Writer called [facility] again to ensure prescription was able to be processed. Isabel in pharmacy stated this refill would be processed today.    Writer called Josi and updated her. She will  prescription today.

## 2022-11-25 NOTE — TELEPHONE ENCOUNTER
Writer spoke with [facility] on 11/22/22 and they stated they could fulfill refill. Per patient, they called today and are unable to fill the prescription. Will pend new refill at [facility] per pt request and cancel script at [facility]. See separate encounter 11/22/22.     Last seen: 9/19/22  RTC: 4 months  Cancel: none  No-show: none  Next appt: 1/5/23     Incoming refill from Patient via phone.    Medication requested:   Pending Prescriptions:                       Disp   Refills    amphetamine-dextroamphetamine (ADDERALL) *90 tab*0            Sig: Take two (2) tabs in the morning and one (1) tab           at noon        Last refill per       From chart note:   -she chooses to continue lamotrigine 150mg daily, Adderall 40mg QAM, 20mg Qnoon         Medication sent to provider for review.

## 2022-12-01 ENCOUNTER — E-VISIT (OUTPATIENT)
Dept: URGENT CARE | Facility: CLINIC | Age: 42
End: 2022-12-01
Payer: COMMERCIAL

## 2022-12-01 DIAGNOSIS — J02.9 PHARYNGITIS, UNSPECIFIED ETIOLOGY: Primary | ICD-10-CM

## 2022-12-01 PROCEDURE — 99421 OL DIG E/M SVC 5-10 MIN: CPT | Performed by: EMERGENCY MEDICINE

## 2022-12-01 NOTE — PATIENT INSTRUCTIONS
Josi, I will order strep testing due to the exposure from your daughter.  Many times this is a cold virus.           The symptoms you describe suggest a viral cause, which is much more common than a bacterial cause. Antibiotics will treat bacterial infections, but have no effect on viral infections. If possible, especially if improving, start with symptom care for the first 7-10 days, then consider seeking further treatment or taking an antibiotic. Bacterial infections generally are more severe, including symptoms such as pus, fever over 101degrees F, or rapidly worsening.  Dear Lashonda Rodriguez    After reviewing your responses, I've been able to diagnose you with Pharyngitis, unspecified etiology.      Based on your responses and diagnosis, I have ordered strep testing due to your exposure with your daughter.    It is also important to stay well hydrated, get lots of rest and take over-the-counter decongestants,?tylenol?or ibuprofen if you?are able to?take those medications per your primary care provider to help relieve discomfort.?     It is important that you take?all of?your prescribed medication even if your symptoms are improving after a few doses.? Taking?all of?your medicine helps prevent the symptoms from returning.?     If your symptoms worsen, you develop severe headache, vomiting, high fever (>102), or are not improving in 7 days, please contact your primary care provider for an appointment or visit any of our convenient Walk-in Care or Urgent Care Centers to be seen which can be found on our website?here.?     Thanks again for choosing?us?as your health care partner,?   ?  Marek Sky MD?

## 2022-12-02 ENCOUNTER — ALLIED HEALTH/NURSE VISIT (OUTPATIENT)
Dept: FAMILY MEDICINE | Facility: CLINIC | Age: 42
End: 2022-12-02
Payer: COMMERCIAL

## 2022-12-02 DIAGNOSIS — J02.9 PHARYNGITIS, UNSPECIFIED ETIOLOGY: ICD-10-CM

## 2022-12-02 LAB
DEPRECATED S PYO AG THROAT QL EIA: NEGATIVE
GROUP A STREP BY PCR: NOT DETECTED

## 2022-12-02 PROCEDURE — 87651 STREP A DNA AMP PROBE: CPT

## 2022-12-02 PROCEDURE — 99207 PR NO CHARGE NURSE ONLY: CPT

## 2022-12-19 ENCOUNTER — MYC REFILL (OUTPATIENT)
Dept: PSYCHIATRY | Facility: CLINIC | Age: 42
End: 2022-12-19

## 2022-12-19 ENCOUNTER — E-VISIT (OUTPATIENT)
Dept: URGENT CARE | Facility: CLINIC | Age: 42
End: 2022-12-19
Payer: COMMERCIAL

## 2022-12-19 DIAGNOSIS — N39.0 ACUTE UTI (URINARY TRACT INFECTION): ICD-10-CM

## 2022-12-19 DIAGNOSIS — B37.31 YEAST INFECTION OF THE VAGINA: Primary | ICD-10-CM

## 2022-12-19 DIAGNOSIS — G47.419 PRIMARY NARCOLEPSY WITHOUT CATAPLEXY: ICD-10-CM

## 2022-12-19 PROCEDURE — 99421 OL DIG E/M SVC 5-10 MIN: CPT | Performed by: PHYSICIAN ASSISTANT

## 2022-12-19 RX ORDER — SULFAMETHOXAZOLE/TRIMETHOPRIM 800-160 MG
1 TABLET ORAL 2 TIMES DAILY
Qty: 6 TABLET | Refills: 0 | Status: SHIPPED | OUTPATIENT
Start: 2022-12-19 | End: 2022-12-22

## 2022-12-19 RX ORDER — FLUCONAZOLE 150 MG/1
150 TABLET ORAL ONCE
Qty: 1 TABLET | Refills: 0 | Status: SHIPPED | OUTPATIENT
Start: 2022-12-19 | End: 2022-12-19

## 2022-12-19 NOTE — PATIENT INSTRUCTIONS
Dear Lashonda Rodriguez    After reviewing your responses, I've been able to diagnose you with a urinary tract infection, which is a common infection of the bladder with bacteria.  This is not a sexually transmitted infection, though urinating immediately after intercourse can help prevent infections.  Drinking lots of fluids is also helpful to clear your current infection and prevent the next one.      I have sent a prescription for antibiotics to your pharmacy to treat this infection.    It is important that you take all of your prescribed medication even if your symptoms are improving after a few doses.  Taking all of your medicine helps prevent the symptoms from returning.     If your symptoms worsen, you develop pain in your back or stomach, develop fevers, or are not improving in 5 days, please contact your primary care provider for an appointment or visit any of our convenient Walk-in or Urgent Care Centers to be seen, which can be found on our website here.    Thanks again for choosing us as your health care partner,    Rivka Rider PA-C, AMADA    Urinary Tract Infections in Women  Urinary tract infections (UTIs) are most often caused by bacteria. These bacteria enter the urinary tract. The bacteria may come from inside the body. Or they may travel from the skin outside the rectum or vagina into the urethra. Female anatomy makes it easy for bacteria from the bowel to enter a woman s urinary tract, which is the most common source of UTI. This means women develop UTIs more often than men. Pain in or around the urinary tract is a common UTI symptom. But the only way to know for sure if you have a UTI for the healthcare provider to test your urine. The two tests that may be done are the urinalysis and urine culture.     Types of UTIs    Cystitis. A bladder infection (cystitis) is the most common UTI in women. You may have urgent or frequent need to pee. You may also have pain, burning when you pee,  and bloody urine.    Urethritis. This is an inflamed urethra, which is the tube that carries urine from the bladder to outside the body. You may have lower stomach or back pain. You may also have urgent or frequent need to pee.    Pyelonephritis. This is a kidney infection. If not treated, it can be serious and damage your kidneys. In severe cases, you may need to stay in the hospital. You may have a fever and lower back pain.    Medicines to treat a UTI  Most UTIs are treated with antibiotics. These kill the bacteria. The length of time you need to take them depends on the type of infection. It may be as short as 3 days. If you have repeated UTIs, you may need a low-dose antibiotic for several months. Take antibiotics exactly as directed. Don t stop taking them until all of the medicine is gone. If you stop taking the antibiotic too soon, the infection may not go away. You may also develop a resistance to the antibiotic. This can make it much harder to treat.   Lifestyle changes to treat and prevent UTIs   The lifestyle changes below will help get rid of your UTI. They may also help prevent future UTIs.     Drink plenty of fluids. This includes water, juice, or other caffeine-free drinks. Fluids help flush bacteria out of your body.    Empty your bladder. Always empty your bladder when you feel the urge to pee. And always pee before going to sleep. Urine that stays in your bladder can lead to infection. Try to pee before and after sex as well.    Practice good personal hygiene. Wipe yourself from front to back after using the toilet. This helps keep bacteria from getting into the urethra.    Use condoms during sex. These help prevent UTIs caused by sexually transmitted bacteria. Also don't use spermicides during sex. These can increase the risk for UTIs. Choose other forms of birth control instead. For women who tend to get UTIs after sex, a low-dose of a preventive antibiotic may be used. Be sure to discuss this  option with your healthcare provider.    Follow up with your healthcare provider as directed. He or she may test to make sure the infection has cleared. If needed, more treatment may be started.  Doe last reviewed this educational content on 7/1/2019 2000-2021 [employer]. All rights reserved. This information is not intended as a substitute for professional medical care. Always follow your healthcare professional's instructions.

## 2022-12-20 NOTE — TELEPHONE ENCOUNTER
Last Seen 9/19/22  RTC 4 months  Cancel 0  No-Show 0    Next Appt 1/5/23    Incoming Refill From patient request via [facility]    Medication Requested   amphetamine-dextroamphetamine (ADDERALL) 20 MG tablet    Directions   Take two (2) tabs in the morning and one (1) tab at noon    Qty 90    Last Refill 11/25/22    Medication Refill Pended Per Refill Protocol

## 2022-12-20 NOTE — TELEPHONE ENCOUNTER
Per MN  amphetamine-dextroamphetamine (ADDERALL) 20 MG tablet 11/25 #90, 10/22 #90, 9/22 #90    Writer routed to provider for approval.     Per refill encounter on 11/25 Adderall script written on 11/22 was [facility] at [facility].

## 2022-12-21 RX ORDER — DEXTROAMPHETAMINE SACCHARATE, AMPHETAMINE ASPARTATE, DEXTROAMPHETAMINE SULFATE AND AMPHETAMINE SULFATE 5; 5; 5; 5 MG/1; MG/1; MG/1; MG/1
TABLET ORAL
Qty: 90 TABLET | Refills: 0 | Status: SHIPPED | OUTPATIENT
Start: 2022-12-21 | End: 2023-01-05

## 2023-01-05 ENCOUNTER — VIRTUAL VISIT (OUTPATIENT)
Dept: PSYCHIATRY | Facility: CLINIC | Age: 43
End: 2023-01-05
Attending: NURSE PRACTITIONER
Payer: COMMERCIAL

## 2023-01-05 DIAGNOSIS — F33.42 MAJOR DEPRESSIVE DISORDER, RECURRENT EPISODE, IN FULL REMISSION (H): ICD-10-CM

## 2023-01-05 DIAGNOSIS — G47.419 PRIMARY NARCOLEPSY WITHOUT CATAPLEXY: ICD-10-CM

## 2023-01-05 PROCEDURE — 99214 OFFICE O/P EST MOD 30 MIN: CPT | Mod: 95 | Performed by: NURSE PRACTITIONER

## 2023-01-05 RX ORDER — LAMOTRIGINE 100 MG/1
TABLET ORAL
Qty: 30 TABLET | Refills: 0 | Status: SHIPPED | OUTPATIENT
Start: 2023-01-05 | End: 2023-04-06

## 2023-01-05 RX ORDER — DEXTROAMPHETAMINE SACCHARATE, AMPHETAMINE ASPARTATE, DEXTROAMPHETAMINE SULFATE AND AMPHETAMINE SULFATE 5; 5; 5; 5 MG/1; MG/1; MG/1; MG/1
TABLET ORAL
Qty: 90 TABLET | Refills: 0 | Status: SHIPPED | OUTPATIENT
Start: 2023-03-17 | End: 2023-05-24

## 2023-01-05 RX ORDER — LAMOTRIGINE 25 MG/1
25 TABLET ORAL DAILY
Qty: 30 TABLET | Refills: 0 | Status: SHIPPED | OUTPATIENT
Start: 2023-01-05 | End: 2023-04-06

## 2023-01-05 RX ORDER — DEXTROAMPHETAMINE SACCHARATE, AMPHETAMINE ASPARTATE, DEXTROAMPHETAMINE SULFATE AND AMPHETAMINE SULFATE 5; 5; 5; 5 MG/1; MG/1; MG/1; MG/1
TABLET ORAL
Qty: 90 TABLET | Refills: 0 | Status: SHIPPED | OUTPATIENT
Start: 2023-02-17 | End: 2023-04-25

## 2023-01-05 RX ORDER — DEXTROAMPHETAMINE SACCHARATE, AMPHETAMINE ASPARTATE, DEXTROAMPHETAMINE SULFATE AND AMPHETAMINE SULFATE 5; 5; 5; 5 MG/1; MG/1; MG/1; MG/1
TABLET ORAL
Qty: 90 TABLET | Refills: 0 | Status: SHIPPED | OUTPATIENT
Start: 2023-01-20 | End: 2023-05-24

## 2023-01-05 ASSESSMENT — PATIENT HEALTH QUESTIONNAIRE - PHQ9
SUM OF ALL RESPONSES TO PHQ QUESTIONS 1-9: 0
SUM OF ALL RESPONSES TO PHQ QUESTIONS 1-9: 0
10. IF YOU CHECKED OFF ANY PROBLEMS, HOW DIFFICULT HAVE THESE PROBLEMS MADE IT FOR YOU TO DO YOUR WORK, TAKE CARE OF THINGS AT HOME, OR GET ALONG WITH OTHER PEOPLE: NOT DIFFICULT AT ALL

## 2023-01-05 NOTE — NURSING NOTE
Patient states may need to have new prior authorization for Adderall for new year.     Rosa Paris VF

## 2023-01-05 NOTE — PROGRESS NOTES
"VIDEO VISIT  Lashonda Rodriguez is a 42 year old who is being evaluated via a billable video visit.      Telehealth Details  Type of service:  medication management  Time of service:    Start Time:  8:05 AM     End Time:  8:25a    Reason for Telehealth Visit: Patient has requested telehealth visit  Originating Site (patient location):  [facility]   Location- Patient's home  Distant Site (provider location):  Off-site  Mode of Communication:  [facility]  Psychiatry Clinic  PSYCHIATRIC PROGRESS NOTE  telemedicine       Lashonda Rodriguez is a 42 year old female who prefers the name Josi and pronouns she, her, hers, herself.  Therapist: couples counseling as needed  PCP: Selma Alba  Other Providers: None     PREVIOUS PSYCH MED TRIALS:  - Provigil (limited effect, 2-3 month trial)  - Nuvigil 250mg (trialed in 2013-ineffective)  - buspar 30mg (unknown)  - Wellbutrin XL (taken with Adderall, two seizures in 2009/2010)  - AMT 10mg for skin picking (unknown)  - NAC (ineffective, unsure about her compliance)     Pertinent Background:  See previous notes.  Psych critical item history includes [no critical items].      Interim History                                                                                                        4, 4      The patient is a good historian, reports good treatment adherence.    Last seen 9/19/2022 when she chose to continue lamotrigine 150mg daily, Adderall 40mg QAM, 20mg Qnoon.    Since the last visit, she's been good.   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  - her [employer] program (with a mentor for her) may become a focus again in the next 3-12 months  - traveled to [place] with Tono  - her " 19yo daughter Leticia is doing well, holidays went better than she expected  - her 17yo daughter Desiree is applying for colleges   - getting back to her exercise schedule at [employer], 3x weekly  - 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, 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, she's pleased to contribute to writing music    Recent Symptoms:   Depression: she denies significant depression symptoms, improved brain fog with daily MV  Anxiety: skillfully  her worth from demand/ hours at work, good boundaries about working after hours  - infrequent skin picking     ADVERSE EFFECTS: none  MEDICAL CONCERNS: none today     APPETITE: OK, 165# in June 2022, eats vegan   SLEEP: with melatonin 10mg, sleeping 6-7 hours most nights, might wake to the needs of her dog     Substance Use:  Alcohol- limits, prefers craft beer  Caffeine- takes No Doze with Adderall for narcolepsy     Per 9/14/2016 note: alcohol, cannabis, meth abuse, treatment about 16yo in 1997 for cannabis        Social/ Family History                                  [per patient report]                                 1ea,1ea      FINANCIAL SUPPORT- 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 2003, Leticia b. 2002)       LIVING SITUATION- lives with  Tono (m. 2015) and two daughters      LEGAL- None  EARLY HISTORY/ EDUCATION- Grew up in [place] with half siblings. BS in Biology from [employer]  SOCIAL/ SPIRITUAL SUPPORT- support from family, friends       CULTURAL INFLUENCES/ IMPACT- UNKNOWN       TRAUMA HISTORY- None  FEELS SAFE AT HOME- Yes  FAMILY HISTORY-  daughter Leticia- sertraline for anxiety    Medical / Surgical History                                 Patient Active Problem List   Diagnosis     Major depressive disorder, recurrent episode (H)     Narcolepsy     Bladder spasms     Exercise-induced asthma     CARDIOVASCULAR SCREENING; LDL GOAL LESS THAN 160     Impulse  control disorder     Attention deficit disorder     Hx of psychiatric care     Melasma     Rotator cuff tendinitis, left     Diffuse photodamage of skin       Past Surgical History:   Procedure Laterality Date     ECTOPIC PREGNANCY SURGERY       GYN SURGERY  ectopic pregnancy     HC TOOTH EXTRACTION W/FORCEP        Medical Review of Systems         [2,10]     Pregnant/ breastfeeding- No    Contraception- IUD  A comprehensive review of systems was performed and is negative other than noted in the HPI.    Denies head trauma, LOC.      While taking Wellbutrin, history of general tonic clonic seizure 9/20/2009 without aura with postictal confusion; second seizure while with SO on 1/23/10 (tonic clonic; foaming at the mouth with post ictal confusion and lethargy. Narcolepsy diagnosed about 2004. Remote history of febrile convulsion before 3yo. Her daughter has a history of febrile convulsions.     Allergy    Wellbutrin [bupropion] and Pcn [penicillins]     Wellbutrin- two seizures while taking Wellbutrin and Adderall    Current Medications        Current Outpatient Medications   Medication Sig Dispense Refill     amphetamine-dextroamphetamine (ADDERALL) 20 MG tablet Take two (2) tabs in the morning and one (1) tab at noon 90 tablet 0     amphetamine-dextroamphetamine (ADDERALL) 20 MG tablet Take two (2) tabs in the morning and one (1) tab at noon 90 tablet 0     caffeine (NO-DOZE) 200 MG TABS Take 1 tablet (200 mg) by mouth 2 times daily (Patient taking differently: Take 200 mg by mouth 2 times daily 400 mg qam and 200 qnoon) 150 tablet      Cyanocobalamin (VITAMIN B 12 PO)        fluocin-hydroquinone-tretinoin 0.01-4-0.05 % CREA Apply twice daily to the face 30 g 3     lamoTRIgine (LAMICTAL) 150 MG tablet Take 1 tablet (150 mg) by mouth daily 90 tablet 1     melatonin 5 MG tablet Take 10 mg by mouth nightly as needed        Multiple Vitamins-Minerals (MULTIVITAMIN ADULT PO) Take by mouth daily        Vitals         [3,  3]   There were no vitals taken for this visit.   Mental Status Exam        [9, 14 cog gs]     Alertness: alert  and oriented  Appearance: N/A  Behavior/Demeanor: cooperative, pleasant and calm, with N/A eye contact   Speech: normal and regular rate and rhythm  Language: no problems  Psychomotor: N/A  Mood: description consistent with euthymia  Affect: appropriate; was congruent to mood; was congruent to content  Thought Process/Associations: unremarkable  Thought Content:  Reports none;  Denies suicidal ideation, violent ideation, delusions, preoccupations, obsessions , phobia  and magical thinking  Perception:  Reports none;  Denies auditory hallucinations, visual hallucinations, visual distortion seen as shadows , depersonalization and derealization  Insight: fair  Judgment: good  Cognition: (6) does  appear grossly intact; formal cognitive testing was not done  Gait/Station and/or Muscle Strength/Tone: N/A    Labs and Data                          Rating Scales:      Answers for HPI/ROS submitted by the patient on 1/5/2023  If you checked off any problems, how difficult have these problems made it for you to do your work, take care of things at home, or get along with other people?: Not difficult at all  PHQ9 TOTAL SCORE: 0    PHQ9 Today:    PHQ 10/7/2019 9/19/2022 1/5/2023   PHQ-9 Total Score 0 2 0   Q9: Thoughts of better off dead/self-harm past 2 weeks Not at all Not at all Not at all     Diagnosis      primary narcolepsy 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 (diagnosed by sleep study in 2007)  recurrent MDD in remission     Assessment      [m2, h3]     Today the following issues were addressed:      : 12/2023- Adderall 20mg #90 last filled on 12/23/2022     PSYCHOTROPIC DRUG INTERACTIONS:  - LAMOTRIGINE, TYLENOL (may result in decreased lamotrigine effectiveness)     Drug Interaction Management: Monitoring for adverse effects, routine vitals, using lowest therapeutic dose of [psychotropics] and patient is aware of  risks    Plan                                                                                                                     m2, h3     1) discussed options, risks, benefits including lamotrigine taper; she chooses to trial reducing lamotrigine from 150mg to 125mg daily x 3-4 weeks as tolerated then message an update, continue Adderall 40mg QAM, 20mg Qnoon   - lamotrigine started in 2009 following a seizure while taking Adderall and Wellbutrin, she views lamotrigine as helpful for skin picking and depression after Wellbutrin was stopped  - narcolepsy (2004) and seizures (2009/10 while taking Wellbutrin) were confirmed by neurology  - takes No Doze 200mg BID for narcolepsy     2) active in couples counseling      RTC: 4 months, sooner as need    12 min spent on the date of the encounter in chart review, patient visit, review of tests, documentation, care coordination, and/or discussion with other providers about the issues documented above. Any psychotherapy time is excluded from this total.    CRISIS NUMBERS:   Provided routinely in AVS.    Treatment Risk Statement:  The patient understands the risks, benefits, adverse effects and alternatives. Agrees to treatment with the capacity to do so. No medical contraindications to treatment. Agrees to call clinic for any problems. The patient understands to call 911 or go to the nearest ED if life threatening or urgent symptoms occur.     WHODAS 2.0  TODAY total score = N/A; [a 12-item WHODAS 2.0 assessment was not completed by the pt today and/or recorded in EPIC].    PROVIDER:  AYSE Dumont CNP

## 2023-01-05 NOTE — PATIENT INSTRUCTIONS
**For crisis resources, please see the information at the end of this document**   Patient Education    Thank you for coming to the [facility] [place] [facility].     Lab Testing:  If you had lab testing today and your results are reassuring or normal they will be mailed to you or sent through appsplit within 7 days. If the lab tests need quick action we will call you with the results. The phone number we will call with results is # [phone]. If this is not the best number please call our clinic and change the number.     Medication Refills:  If you need any refills please call your pharmacy and they will contact us. Our fax number for refills is [phone].   Three business days of notice are needed for general medication refill requests.   Five business days of notice are needed for controlled substance refill requests.   If you need to change to a different pharmacy, please contact the new pharmacy directly. The new pharmacy will help you get your medications transferred.     Contact Us:  Please call [phone] during business hours (8-5:00 M-F).   If you have medication related questions after clinic hours, or on the weekend, please call [phone].     Financial Assistance [phone]   Medical Records [phone]       MENTAL HEALTH CRISIS RESOURCES:  For a emergency help, please call 911 or go to the nearest Emergency Department.     Emergency Walk-In Options:   [facility] Unit @ [facility] ([place]): [phone] - Specialized mental health emergency area designed to be calming  [facility] ([place]): [phone]  [facility] ([place]): [phone]  [facility]): [phone]    [facility] Crisis Information:   [place]: [phone]  [place]: [phone]  [place] ([place]) - Adult: [phone]     Child: [phone]  [place] - Adult: [phone]     Child: [phone]  [place]:  [phone]  List of all [facility] resources:   https://[place].gov/dhs/people-we-serve/adults/health-care/mental-health/resources/crisis-contacts.jsp    National Crisis Information:   Crisis Text Line: Text  [place]  to 474757  Suicide & Crisis Lifeline: 988  National Suicide Prevention Lifeline: 9-037-091-TALK (1-372.430.9692)       For online chat options, visit https://suicidepreventionlifeline.org/chat/  Poison Control Center: 3-501-499-0926  Trans Lifeline: 8-022-198-8420 - Hotline for transgender people of all ages  The Lb Project: 9-862-947-8164 - Hotline for LGBT youth     For Non-Emergency Support:   Fast Tracker: Mental Health & Substance Use Disorder Resources -   https://www.[employer].org/

## 2023-02-21 ENCOUNTER — MYC REFILL (OUTPATIENT)
Dept: PSYCHIATRY | Facility: CLINIC | Age: 43
End: 2023-02-21
Payer: COMMERCIAL

## 2023-02-21 DIAGNOSIS — G47.419 PRIMARY NARCOLEPSY WITHOUT CATAPLEXY: ICD-10-CM

## 2023-02-21 RX ORDER — DEXTROAMPHETAMINE SACCHARATE, AMPHETAMINE ASPARTATE, DEXTROAMPHETAMINE SULFATE AND AMPHETAMINE SULFATE 5; 5; 5; 5 MG/1; MG/1; MG/1; MG/1
TABLET ORAL
Qty: 90 TABLET | Refills: 0 | Status: CANCELLED | OUTPATIENT
Start: 2023-02-21

## 2023-02-22 ENCOUNTER — OFFICE VISIT (OUTPATIENT)
Dept: FAMILY MEDICINE | Facility: CLINIC | Age: 43
End: 2023-02-22
Payer: COMMERCIAL

## 2023-02-22 VITALS
RESPIRATION RATE: 16 BRPM | OXYGEN SATURATION: 97 % | HEART RATE: 75 BPM | HEIGHT: 66 IN | BODY MASS INDEX: 27.49 KG/M2 | WEIGHT: 171.08 LBS | DIASTOLIC BLOOD PRESSURE: 70 MMHG | SYSTOLIC BLOOD PRESSURE: 108 MMHG | TEMPERATURE: 97.8 F

## 2023-02-22 DIAGNOSIS — Z23 NEED FOR VACCINATION: ICD-10-CM

## 2023-02-22 DIAGNOSIS — Z30.432 ENCOUNTER FOR IUD REMOVAL: ICD-10-CM

## 2023-02-22 DIAGNOSIS — Z12.4 ROUTINE CERVICAL SMEAR: Primary | ICD-10-CM

## 2023-02-22 DIAGNOSIS — Z11.3 ROUTINE SCREENING FOR STI (SEXUALLY TRANSMITTED INFECTION): ICD-10-CM

## 2023-02-22 PROCEDURE — 87591 N.GONORRHOEAE DNA AMP PROB: CPT | Performed by: FAMILY MEDICINE

## 2023-02-22 PROCEDURE — 87624 HPV HI-RISK TYP POOLED RSLT: CPT | Performed by: FAMILY MEDICINE

## 2023-02-22 PROCEDURE — G0145 SCR C/V CYTO,THINLAYER,RESCR: HCPCS | Performed by: FAMILY MEDICINE

## 2023-02-22 PROCEDURE — 87491 CHLMYD TRACH DNA AMP PROBE: CPT | Performed by: FAMILY MEDICINE

## 2023-02-22 ASSESSMENT — ASTHMA QUESTIONNAIRES: ACT_TOTALSCORE: 25

## 2023-02-22 ASSESSMENT — ANXIETY QUESTIONNAIRES
1. FEELING NERVOUS, ANXIOUS, OR ON EDGE: NOT AT ALL
3. WORRYING TOO MUCH ABOUT DIFFERENT THINGS: NOT AT ALL
2. NOT BEING ABLE TO STOP OR CONTROL WORRYING: NOT AT ALL
GAD7 TOTAL SCORE: 0
5. BEING SO RESTLESS THAT IT IS HARD TO SIT STILL: NOT AT ALL
7. FEELING AFRAID AS IF SOMETHING AWFUL MIGHT HAPPEN: NOT AT ALL
6. BECOMING EASILY ANNOYED OR IRRITABLE: NOT AT ALL
GAD7 TOTAL SCORE: 0

## 2023-02-22 ASSESSMENT — PATIENT HEALTH QUESTIONNAIRE - PHQ9
SUM OF ALL RESPONSES TO PHQ QUESTIONS 1-9: 0
5. POOR APPETITE OR OVEREATING: NOT AT ALL

## 2023-02-22 NOTE — PROGRESS NOTES
"CC: Establish Care (PAP and Est. Care) and Contraception (IUC removal; Mirena.)      Josi is a 43 year old female who presents to clinic for an IUD removal, pap smear, and to establish care.       Gynecological history:  Menstrual symptoms: Mirena IUD placed 6 years ago, lighter menses with IUD but  has vasectomy so would like IUD removal  Last Pap:  10/8/2020, result Normal and negative HPV  History of abnormal Paps: no  Concern for STIs: no but would like to update screening  Contraceptive method:  with vasectomy  HPV vaccine: received 1st dose, due for 2nd dose      OBJECTIVE:   /70 (BP Location: Right arm, Patient Position: Sitting, Cuff Size: Adult Regular)   Pulse 75   Temp 97.8  F (36.6  C) (Skin)   Resp 16   Ht 1.676 m (5' 6\")   Wt 77.6 kg (171 lb 1.3 oz)   SpO2 97%   BMI 27.61 kg/m     General: Healthy female in NAD.  Cooperative and pleasant..    Gyn: Normal appearing external genitalia without lesion.  Speculum placed. Vaginal mucosa pink and moist.  Cervix visualized and Pap performed. IUD strings visualized at cervical os and grasped with ring forceps, removed intact. Bimanual exam revealed no uterine or ovarian masses.       ASSESSMENT AND PLAN:   Lashonda was seen today for establish care and contraception. Pap smear collected. IUD removed. STI screening collected. HPV vaccine #2 today.     Routine cervical smear  -     Gynecologic Cytology (PAP); Future    Need for vaccination  -     HPV9    Encounter for IUD removal  -     REMOVE INTRAUTERINE DEVICE    Routine screening for STI (sexually transmitted infection)  -     NEISSERIA GONORRHOEA PCR  -     CHLAMYDIA TRACHOMATIS PCR      Jazmin Guy MD  Family Medicine  "

## 2023-02-22 NOTE — NURSING NOTE
Prior to immunization administration, verified patients identity using patient s name and date of birth. Please see Immunization Activity for additional information.     Screening Questionnaire for Adult Immunization    Are you sick today?   No   Do you have allergies to medications, food, a vaccine component or latex?   No   Have you ever had a serious reaction after receiving a vaccination?   No   Do you have a long-term health problem with heart, lung, kidney, or metabolic disease (e.g., diabetes), asthma, a blood disorder, no spleen, complement component deficiency, a cochlear implant, or a spinal fluid leak?  Are you on long-term aspirin therapy?   No   Do you have cancer, leukemia, HIV/AIDS, or any other immune system problem?   No   Do you have a parent, brother, or sister with an immune system problem?   No   In the past 3 months, have you taken medications that affect  your immune system, such as prednisone, other steroids, or anticancer drugs; drugs for the treatment of rheumatoid arthritis, Crohn s disease, or psoriasis; or have you had radiation treatments?   No   Have you had a seizure, or a brain or other nervous system problem?   No   During the past year, have you received a transfusion of blood or blood    products, or been given immune (gamma) globulin or antiviral drug?   No   For women: Are you pregnant or is there a chance you could become       pregnant during the next month?   No   Have you received any vaccinations in the past 4 weeks?   No     Immunization questionnaire answers were all negative.        Per orders of Dr. Guy, injection of HPV9 given by Emilia Izquierdo LPN. Patient instructed to remain in clinic for 15 minutes afterwards, and to report any adverse reaction to me immediately.       Screening performed by Emilia Izquierdo LPN on 2/22/2023 at 9:58 AM.

## 2023-02-22 NOTE — TELEPHONE ENCOUNTER
Last Seen: 1-5-2023  RTC:  4 month or sooner   Cancel: none   No-Show: none   Next Appt: none     Incoming Refill From patient  via my chart     Medication Requested: amphetamine-dextroamphetamine (ADDERALL) 20 MG tablet  Directions: Sig: Take two (2) tabs in the morning and one (1) tab at noon  Qty: see    Last Refill:       Still have refills on file .      Medication Refill pended  Per Refill Protocol    Cristina Anderson on 2/22/2023 at 9:04 AM

## 2023-02-22 NOTE — TELEPHONE ENCOUNTER
Writer called and informed patient there's two refills on File at [facility]. She will contact them and let us know if there's any issues       Cristina Anderson on 2/22/2023 at 9:08 AM

## 2023-02-23 LAB
C TRACH DNA SPEC QL NAA+PROBE: NEGATIVE
N GONORRHOEA DNA SPEC QL NAA+PROBE: NEGATIVE

## 2023-02-27 LAB
BKR LAB AP GYN ADEQUACY: NORMAL
BKR LAB AP GYN INTERPRETATION: NORMAL
BKR LAB AP HPV REFLEX: NORMAL
BKR LAB AP PREVIOUS ABNORMAL: NORMAL
PATH REPORT.COMMENTS IMP SPEC: NORMAL
PATH REPORT.COMMENTS IMP SPEC: NORMAL
PATH REPORT.RELEVANT HX SPEC: NORMAL

## 2023-03-01 LAB
HUMAN PAPILLOMA VIRUS 16 DNA: NEGATIVE
HUMAN PAPILLOMA VIRUS 18 DNA: NEGATIVE
HUMAN PAPILLOMA VIRUS FINAL DIAGNOSIS: NORMAL
HUMAN PAPILLOMA VIRUS OTHER HR: NEGATIVE

## 2023-03-13 ENCOUNTER — TELEPHONE (OUTPATIENT)
Dept: DERMATOLOGY | Facility: CLINIC | Age: 43
End: 2023-03-13
Payer: COMMERCIAL

## 2023-03-13 NOTE — TELEPHONE ENCOUNTER
Pt called to clarify 4/6 appointment whether she was wanting chemical peel done or a consult. Pt stated that she is needing a consult. Pt wanting to keep in person appointment.    Jina Ruiz RN on 3/13/2023 at 10:58 AM

## 2023-03-24 ENCOUNTER — OFFICE VISIT (OUTPATIENT)
Dept: FAMILY MEDICINE | Facility: CLINIC | Age: 43
End: 2023-03-24
Payer: COMMERCIAL

## 2023-03-24 VITALS
DIASTOLIC BLOOD PRESSURE: 64 MMHG | SYSTOLIC BLOOD PRESSURE: 100 MMHG | RESPIRATION RATE: 16 BRPM | HEART RATE: 77 BPM | OXYGEN SATURATION: 100 % | TEMPERATURE: 97.3 F

## 2023-03-24 DIAGNOSIS — J02.9 SORE THROAT: Primary | ICD-10-CM

## 2023-03-24 LAB
DEPRECATED S PYO AG THROAT QL EIA: NEGATIVE
FLUAV RNA SPEC QL NAA+PROBE: NEGATIVE
FLUBV RNA RESP QL NAA+PROBE: NEGATIVE
GROUP A STREP BY PCR: NOT DETECTED
RSV RNA SPEC NAA+PROBE: NEGATIVE
SARS-COV-2 RNA RESP QL NAA+PROBE: NEGATIVE

## 2023-03-24 PROCEDURE — 87637 SARSCOV2&INF A&B&RSV AMP PRB: CPT | Performed by: FAMILY MEDICINE

## 2023-03-24 PROCEDURE — 87651 STREP A DNA AMP PROBE: CPT | Performed by: FAMILY MEDICINE

## 2023-03-24 NOTE — PROGRESS NOTES
CC: Pharyngitis ([facility]. Negative at home covid test. Wednesday sx started. Took ibuprofen yesterday, didn't note change in sx.)      ASSESSMENT/PLAN: 42 y/o with 2 day history of pharyngitis. Strep negative yesterday and rapid negative here today, will send PCR and viral testing. Supportive cares discussed. Patient in agreement with plan.    Lashonda was seen today for pharyngitis.    Diagnoses and all orders for this visit:    Sore throat  -     Streptococcus A Rapid Screen w/Reflex to PCR  -     Symptomatic Influenza A/B, RSV, & SARS-CoV2 PCR (COVID-19); Future  -     Symptomatic Influenza A/B, RSV, & SARS-CoV2 PCR (COVID-19) Nasopharyngeal  -     Group A Streptococcus PCR Throat Swab        Worrisome signs and symptoms were discussed with Lashonda and she was instructed to return to the clinic for concerning symptoms or to call with questions. All patient questions answered.    Follow up: BERNARDO Garg MD/MPH  Family Medicine      SUBJECTIVE: Josi is a 43 year old female who comes in with the following concerns:    Sore throat  2 days ago, noticed sore throat  Slight, one spot  Yesterday, woke up and had a lot of pain, white spots in back of throat  Woke up today and it was even worse  Gargling with salt water  Ibuprofen - took two last night, not sure if it made a difference  Has felt cold/chills but hasn't had a documented fever  Sneezing a lot, but seems unrelated to sore throat  Some ear discomfort  No nausea/vomiting, no abdominal pain  No urinary symptoms    No known exposure  18 y/o at home    Has had strep as an adult in remote past. Got it really frequently as a kid  Home COVID test negative.      OBJECTIVE:   /64 (BP Location: Left arm, Patient Position: Sitting, Cuff Size: Adult Large)   Pulse 77   Temp 97.3  F (36.3  C) (Temporal)   Resp 16   SpO2 100%   General: Alert and oriented in no acute distress.  Skin: Clear without lesions or rashes.    Lymph: + tender cervical LAD, L > R  Eyes: PERRL. EOMI.   ENT: TMs intact and pearly gray. Oropharynx moist. 2+ tonsils with exudate. Supple neck.  Neuro: Grossly intact, nonfocal.  Cardio: RRR, normal S1 and S2, without murmurs, rubs, or gallops appreciated.    Resp: CTA bilaterally. Normal respiratory effort.   Psych: Mood and affect appropriate.

## 2023-03-24 NOTE — NURSING NOTE
43 year old  Chief Complaint   Patient presents with     Pharyngitis     [facility]. Negative at home covid test. Wednesday sx started. Took ibuprofen yesterday, didn't note change in sx.       Blood pressure 100/64, pulse 77, temperature 97.3  F (36.3  C), temperature source Temporal, resp. rate 16, SpO2 100 %. There is no height or weight on file to calculate BMI.  Patient Active Problem List   Diagnosis     Major depressive disorder, recurrent episode (H)     Narcolepsy     Bladder spasms     Impulse control disorder     Hx of psychiatric care     Melasma     Rotator cuff tendinitis, left     Diffuse photodamage of skin       Wt Readings from Last 2 Encounters:   02/22/23 77.6 kg (171 lb 1.3 oz)   06/10/22 74.8 kg (165 lb)     BP Readings from Last 3 Encounters:   03/24/23 100/64   02/22/23 108/70   07/30/21 107/68         Current Outpatient Medications   Medication     amphetamine-dextroamphetamine (ADDERALL) 20 MG tablet     amphetamine-dextroamphetamine (ADDERALL) 20 MG tablet     amphetamine-dextroamphetamine (ADDERALL) 20 MG tablet     caffeine (NO-DOZE) 200 MG TABS     Cyanocobalamin (VITAMIN B 12 PO)     fluocin-hydroquinone-tretinoin 0.01-4-0.05 % CREA     melatonin 5 MG tablet     Multiple Vitamins-Minerals (MULTIVITAMIN ADULT PO)     lamoTRIgine (LAMICTAL) 100 MG tablet     lamoTRIgine (LAMICTAL) 25 MG tablet     No current facility-administered medications for this visit.       Social History     Tobacco Use     Smoking status: Former     Packs/day: 0.00     Years: 0.00     Pack years: 0.00     Types: Cigarettes     Quit date: 1/1/2008     Years since quitting: 15.2     Smokeless tobacco: Never   Vaping Use     Vaping Use: Never used   Substance Use Topics     Alcohol use: Yes     Comment: 3 beer every 2 days/week     Drug use: No       Health Maintenance Due   Topic Date Due     YEARLY PREVENTIVE VISIT  Never done     ADVANCE CARE PLANNING  Never done     HIV  SCREENING  Never done     HEPATITIS C SCREENING  Never done     HEPATITIS B IMMUNIZATION (3 of 3 - Hep B Twinrix 3-dose series) 03/31/2014     COVID-19 Vaccine (4 - Booster for Pfizer series) 12/24/2021     DTAP/TDAP/TD IMMUNIZATION (4 - Td or Tdap) 01/17/2022       Lab Results   Component Value Date    PAP NIL 10/08/2020         March 24, 2023 10:54 AM     Normal for race

## 2023-03-24 NOTE — PATIENT INSTRUCTIONS
Take ibuprofen 600 mg (3 tablets) three times per day with food  Can alternate with 1,000 mg (2 tablet extra strength) Tylenol.    Throat Coat tea  Follow-up if worsening symptoms, drooling, inability to swallow.

## 2023-04-06 ENCOUNTER — OFFICE VISIT (OUTPATIENT)
Dept: DERMATOLOGY | Facility: CLINIC | Age: 43
End: 2023-04-06
Attending: DERMATOLOGY
Payer: COMMERCIAL

## 2023-04-06 DIAGNOSIS — L90.5 ACNE SCARRING: Primary | ICD-10-CM

## 2023-04-06 DIAGNOSIS — L90.5 SCARRING: ICD-10-CM

## 2023-04-06 PROCEDURE — 99213 OFFICE O/P EST LOW 20 MIN: CPT | Performed by: DERMATOLOGY

## 2023-04-06 RX ORDER — AZELAIC ACID 0.15 G/G
GEL TOPICAL
Qty: 50 G | Refills: 1 | Status: SHIPPED | OUTPATIENT
Start: 2023-04-06 | End: 2023-06-06

## 2023-04-06 NOTE — NURSING NOTE
Lashonda Rodriguez's goals for this visit include:   Chief Complaint   Patient presents with     Scar Management     Patient here for acne scarring on chest. Wants to talk about laser possible in that area       She requests these members of her care team be copied on today's visit information:     PCP: Jazmin Guy    Referring Provider:  Referred Self, MD  No address on file    There were no vitals taken for this visit.    Do you need any medication refills at today's visit? Beba Estrada EMT

## 2023-04-06 NOTE — PROGRESS NOTES
[facility] Dermatology Note  Encounter Date: Apr 6, 2023  Office Visit     Dermatology Problem List:  1. Melasma aw Dr. Castellanos  =tri-veronique    2. Scars chest  -associated skin care    ____________________________________________    Assessment & Plan:    # Dyspigmentation with history of skin picking and excoriations and acne as below. Had outside laser  -triluma- nightly as tolerated-azelaic acid in the am    #Acne, chest  =BP wash  -azelaic acid      #Cellulite see Dr. Hilliard          Procedures Performed:   None     Follow-up: 3 months at [facility] [place] [place], add tranexamic acid or cyspera or kojic acid    Staff and Scribe:     Scribe Disclosure:   I, Sameer Guevara, am serving as a scribe to document services personally performed by this physician, Dr. Sabrina Nails, based on data collection and the provider's statements to me.       Provider Disclosure:   The documentation recorded by the scribe accurately reflects the services I personally performed and the decisions made by me.    Sabrina Nails MD    Department of Dermatology  [facility]: Phone: [phone], Fax:[phone]  [facility]: Phone: [phone], Fax: [phone]    ____________________________________________    CC: Scar Management (Patient here for acne scarring on chest. Wants to talk about laser possible in that area)    HPI:  Ms. Lashonda Rodriguez is a(n) 43 year old female who presents today as a new patient for a spot check.    Referred to derm for laser treatment of photodamage and dynamic rhytides.    Today, she reports acne scars on the chest.     Reports history of skin pickiung.     No pregnant or breastfeeding.     Had treatment associate skin care. No records, treated once and was rash like after laser. And was not helpful.     Resurfacing was in nov    Patient is otherwise feeling well, without  additional skin concerns.    Labs Reviewed:  N/A    Physical Exam:  Vitals: There were no vitals taken for this visit.  SKIN:  Hyperpigmentation, hypopigmentation and scarring on the chest, and acneiform papules  - No other lesions of concern on areas examined.     Medications:  Current Outpatient Medications   Medication     amphetamine-dextroamphetamine (ADDERALL) 20 MG tablet     amphetamine-dextroamphetamine (ADDERALL) 20 MG tablet     amphetamine-dextroamphetamine (ADDERALL) 20 MG tablet     caffeine (NO-DOZE) 200 MG TABS     Cyanocobalamin (VITAMIN B 12 PO)     fluocin-hydroquinone-tretinoin 0.01-4-0.05 % CREA     lamoTRIgine (LAMICTAL) 100 MG tablet     lamoTRIgine (LAMICTAL) 25 MG tablet     melatonin 5 MG tablet     Multiple Vitamins-Minerals (MULTIVITAMIN ADULT PO)     No current facility-administered medications for this visit.      Past Medical History:   Patient Active Problem List   Diagnosis     Major depressive disorder, recurrent episode (H)     Narcolepsy     Bladder spasms     Impulse control disorder     Hx of psychiatric care     Melasma     Rotator cuff tendinitis, left     Diffuse photodamage of skin     Past Medical History:   Diagnosis Date     Anxiety      Bladder spasms      Depression 01/05/2009     Exercise-induced asthma 06/11/2009    Since resolved.     Narcolepsy 12/28/2009     Seizure (H) 2 approx [id number]    Secondary to Wellbutrin and stimulant use        CC Referred Self, MD  No address on file on close of this encounter.

## 2023-04-06 NOTE — PATIENT INSTRUCTIONS
Referral to Dr. Hilliard, [facility], [address] [place] [address], Phone: [phone]        Can bleach clothing and towels. Please, hold this if irritated.

## 2023-04-06 NOTE — LETTER
4/6/2023         RE: Lashonda Rodriguez  [address]  [place] [address]        Dear Colleague,    Thank you for referring your patient, Lashonda Rodriguez, to the [facility]. Please see a copy of my visit note below.    [facility] Dermatology Note  Encounter Date: Apr 6, 2023  Office Visit     Dermatology Problem List:  1. Melasma aw Dr. Castellanos  =tri-veronique    2. Scars chest  -associated skin care    ____________________________________________    Assessment & Plan:    # Dyspigmentation with history of skin picking and excoriations and acne as below. Had outside laser  -triluma- nightly as tolerated-azelaic acid in the am    #Acne, chest  =BP wash  -azelaic acid      #Cellulite see Dr. Hilliard          Procedures Performed:   None     Follow-up: 3 months at [facility], add tranexamic acid or cyspera or kojic acid    Staff and Scribe:     Scribe Disclosure:   I, Sameer Guevara, am serving as a scribe to document services personally performed by this physician, Dr. Sabrina Nails, based on data collection and the provider's statements to me.       Provider Disclosure:   The documentation recorded by the scribe accurately reflects the services I personally performed and the decisions made by me.    Sabrina Nails MD    Department of Dermatology  [facility]: Phone: [phone], Fax:[phone]  [facility]: Phone: [phone], Fax: [phone]    ____________________________________________    CC: Scar Management (Patient here for acne scarring on chest. Wants to talk about laser possible in that area)    HPI:  Ms. Lashonda Rodriguez is a(n) 43 year old female who presents today as a new patient for a spot check.    Referred to derm for laser treatment of photodamage and dynamic rhytides.    Today, she reports acne scars on  the chest.     Reports history of skin pickiung.     No pregnant or breastfeeding.     Had treatment associate skin care. No records, treated once and was rash like after laser. And was not helpful.     Resurfacing was in nov    Patient is otherwise feeling well, without additional skin concerns.    Labs Reviewed:  N/A    Physical Exam:  Vitals: There were no vitals taken for this visit.  SKIN:  Hyperpigmentation, hypopigmentation and scarring on the chest, and acneiform papules  - No other lesions of concern on areas examined.     Medications:  Current Outpatient Medications   Medication     amphetamine-dextroamphetamine (ADDERALL) 20 MG tablet     amphetamine-dextroamphetamine (ADDERALL) 20 MG tablet     amphetamine-dextroamphetamine (ADDERALL) 20 MG tablet     caffeine (NO-DOZE) 200 MG TABS     Cyanocobalamin (VITAMIN B 12 PO)     fluocin-hydroquinone-tretinoin 0.01-4-0.05 % CREA     lamoTRIgine (LAMICTAL) 100 MG tablet     lamoTRIgine (LAMICTAL) 25 MG tablet     melatonin 5 MG tablet     Multiple Vitamins-Minerals (MULTIVITAMIN ADULT PO)     No current facility-administered medications for this visit.      Past Medical History:   Patient Active Problem List   Diagnosis     Major depressive disorder, recurrent episode (H)     Narcolepsy     Bladder spasms     Impulse control disorder     Hx of psychiatric care     Melasma     Rotator cuff tendinitis, left     Diffuse photodamage of skin     Past Medical History:   Diagnosis Date     Anxiety      Bladder spasms      Depression 01/05/2009     Exercise-induced asthma 06/11/2009    Since resolved.     Narcolepsy 12/28/2009     Seizure (H) 2 approx [id number]    Secondary to Wellbutrin and stimulant use        CC Referred Self, MD  No address on file on close of this encounter.     Again, thank you for allowing me to participate in the care of your patient.        Sincerely,        Sabrina Nails MD

## 2023-04-10 ENCOUNTER — OFFICE VISIT (OUTPATIENT)
Dept: OPTOMETRY | Facility: CLINIC | Age: 43
End: 2023-04-10
Payer: COMMERCIAL

## 2023-04-10 DIAGNOSIS — H04.123 DRY EYES: ICD-10-CM

## 2023-04-10 DIAGNOSIS — Z01.01 ENCOUNTER FOR EXAMINATION OF EYES AND VISION WITH ABNORMAL FINDINGS: Primary | ICD-10-CM

## 2023-04-10 DIAGNOSIS — H52.223 REGULAR ASTIGMATISM OF BOTH EYES: ICD-10-CM

## 2023-04-10 DIAGNOSIS — Z98.890 HISTORY OF PHOTOREFRACTIVE KERATECTOMY (PRK): ICD-10-CM

## 2023-04-10 PROCEDURE — 92004 COMPRE OPH EXAM NEW PT 1/>: CPT | Performed by: OPTOMETRIST

## 2023-04-10 PROCEDURE — 92015 DETERMINE REFRACTIVE STATE: CPT | Performed by: OPTOMETRIST

## 2023-04-10 RX ORDER — LAMOTRIGINE 150 MG/1
TABLET ORAL
COMMUNITY
Start: 2023-03-16 | End: 2023-05-24

## 2023-04-10 ASSESSMENT — KERATOMETRY
OS_AXISANGLE_DEGREES: 086
OS_K1POWER_DIOPTERS: 38.75
OD_K2POWER_DIOPTERS: 38.75
OD_K1POWER_DIOPTERS: 38.50
OD_AXISANGLE2_DEGREES: 022
OD_AXISANGLE_DEGREES: 112
OS_K2POWER_DIOPTERS: 39.75
OS_AXISANGLE2_DEGREES: 176

## 2023-04-10 ASSESSMENT — CONF VISUAL FIELD
OD_INFERIOR_NASAL_RESTRICTION: 0
OD_NORMAL: 1
OS_INFERIOR_TEMPORAL_RESTRICTION: 0
OS_SUPERIOR_TEMPORAL_RESTRICTION: 0
OD_SUPERIOR_TEMPORAL_RESTRICTION: 0
OS_NORMAL: 1
OD_INFERIOR_TEMPORAL_RESTRICTION: 0
OS_INFERIOR_NASAL_RESTRICTION: 0
OD_SUPERIOR_NASAL_RESTRICTION: 0
OS_SUPERIOR_NASAL_RESTRICTION: 0
METHOD: COUNTING FINGERS

## 2023-04-10 ASSESSMENT — REFRACTION_MANIFEST
OS_SPHERE: +0.25
OS_SPHERE: -0.75
OS_CYLINDER: +0.75
OD_CYLINDER: +0.75
METHOD_AUTOREFRACTION: 1
OD_AXIS: 172
OS_CYLINDER: +0.25
OD_SPHERE: PLANO
OD_AXIS: 169
OS_AXIS: 176
OS_AXIS: 007
OD_CYLINDER: +1.00
OD_SPHERE: -0.25

## 2023-04-10 ASSESSMENT — VISUAL ACUITY
OS_SC: 20/20
OD_SC+: +1
OD_SC: 20/40
OD_SC: 20/30
METHOD: SNELLEN - LINEAR
OS_SC: 20/20

## 2023-04-10 ASSESSMENT — CUP TO DISC RATIO
OD_RATIO: 0.2
OS_RATIO: 0.3

## 2023-04-10 ASSESSMENT — SLIT LAMP EXAM - LIDS
COMMENTS: NORMAL, FULL CLOSURE WITH BLINK
COMMENTS: NORMAL, FULL CLOSURE WITH BLINK

## 2023-04-10 ASSESSMENT — TONOMETRY
OS_IOP_MMHG: 14
OD_IOP_MMHG: 14
IOP_METHOD: APPLANATION

## 2023-04-10 ASSESSMENT — EXTERNAL EXAM - LEFT EYE: OS_EXAM: NORMAL

## 2023-04-10 ASSESSMENT — EXTERNAL EXAM - RIGHT EYE: OD_EXAM: NORMAL

## 2023-04-10 NOTE — PATIENT INSTRUCTIONS
Glasses prescription provided today.     Continue use of Gaby 128 ointment at bedtime each eye and artificial tears throughout the day as needed.     Return in 1 year for a comprehensive eye exam, or sooner if needed.      The effects of the dilating drops last for 4- 6 hours.  You will be more sensitive to light and vision will be blurry up close.  Mydriatic sunglasses were given if needed.     Jorge Nesbitt, ARACELI  [address]  [place]  [address] [phone]

## 2023-04-10 NOTE — PROGRESS NOTES
Chief Complaint   Patient presents with     Annual Eye Exam      -S/P PRK each eye - Dr. Walden 03/2021     Last Eye Exam: Unsure - has had follow ups with Dr. Walden but not CE's - last seen by him ~6 mos ago     Dilated Previously: Yes, side effects of dilation explained today    What are you currently using to see?  does not use glasses or contacts since PRK        Distance Vision Acuity: Noticed gradual change in both eyes - vision has never been perfect since her PRK - thinks she has astigmatism that is affecting it      Near Vision Acuity: Not satisfied - noticing she has been seeing double - things stacked on top of each other - notices mostly when she is tired or her eyes are strained. Ongoing since PRK     Eye Comfort: dry eye syndrome - ongoing for many years  Do you use eye drops? 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       OBJECTIVE: See Ophthalmology exam    ASSESSMENT:    ICD-10-CM    1. Encounter for examination of eyes and vision with abnormal findings  Z01.01 EYE EXAM (SIMPLE-NONBILLABLE)      2. History of photorefractive keratectomy (PRK)  Z98.890 EYE EXAM (SIMPLE-NONBILLABLE)      3. Dry eyes  H04.123 EYE EXAM (SIMPLE-NONBILLABLE)      4. Regular astigmatism of both eyes  H52.223 EYE EXAM (SIMPLE-NONBILLABLE)     REFRACTION          PLAN:     Patient Instructions   Glasses prescription provided today.     Continue use of Gaby 128 ointment at bedtime each eye and artificial tears throughout the day as needed.     Return in 1 year for a comprehensive eye exam, or sooner if needed.      The effects of the dilating drops last for 4- 6 hours.  You will be more sensitive to light and vision will be blurry  up close.  Mydriatic sunglasses were given if needed.     Jorge Nesbitt, OD  [facility] [place]  [address] [facility] [address] [place]  [address]    [phone]

## 2023-04-25 ENCOUNTER — MYC REFILL (OUTPATIENT)
Dept: PSYCHIATRY | Facility: CLINIC | Age: 43
End: 2023-04-25
Payer: COMMERCIAL

## 2023-04-25 DIAGNOSIS — G47.419 PRIMARY NARCOLEPSY WITHOUT CATAPLEXY: ICD-10-CM

## 2023-04-25 NOTE — TELEPHONE ENCOUNTER
Last Seen 1/5/23  RTC 4 months  Cancel 0  No-Show 0    Next Appt none    Incoming Refill From pt request via [employer]    Medication Requested   amphetamine-dextroamphetamine (ADDERALL) 20 MG tablet    Directions   Take two (2) tabs in the morning and one (1) tab at noon    Qty 90    Last Refill 3/17/23    Medication Refill Pended Per Refill Protocol

## 2023-04-26 RX ORDER — DEXTROAMPHETAMINE SACCHARATE, AMPHETAMINE ASPARTATE, DEXTROAMPHETAMINE SULFATE AND AMPHETAMINE SULFATE 5; 5; 5; 5 MG/1; MG/1; MG/1; MG/1
TABLET ORAL
Qty: 90 TABLET | Refills: 0 | Status: SHIPPED | OUTPATIENT
Start: 2023-04-26 | End: 2023-05-24

## 2023-05-11 NOTE — PROGRESS NOTES
Lashonda Rodriguez  :  1980  DOS: 2023  MRN: [id number]  PCP: Jazmin Guy    Sports Medicine Clinic Visit      HPI  Lashonda Rodriguez is a 43 year old female who is seen as a self referral presenting with right wrist pain.    - Mechanism of Injury:  No inciting injury.   - Prior evaluation:  None.    - Pain Character:  Pain has been present for 3 months.  She experiences pain in the dorsal wrist with weightbearing activities like push-ups and pushing doors open.  Also feels volar wrist pain with paresthesias in the median nerve distribution with repetitive wrist motion activities.  Occasionally will radiate up the forearm.  - Endorses:  numbness, tingling in the median nerve distribution distal to the wrist.  Handgrip weakness occasionally.  - Denies:  swelling, clicking, popping, radicular shooting pain.  - Alleviating factors: Rest, positive flick sign, soft wrist brace helps a little, activity modifications.  - Aggravating factors:  lifting heavy objects, handgrip activities, sleeping position, push-ups  - Treatments tried:  rest, soft wrist brace, ibuprofen    - Patient Goals:  get a formal diagnosis, understand the cause of the symptoms, be able to manage the symptoms successfully, discuss treatment options  - Social History: currently employed as desk job     - Pertinent PMH:  Saw Dr. Onofre and 2020 for right-sided posterior elbow impingement and supinator irritation, possible PIN neuropathy based on symptoms.  Also has been seen by Ortho for left rotator cuff tendinopathy and mechanical cLBP.      Review of Systems  Musculoskeletal: as above  Remainder of review of systems is negative including constitutional, CV, pulmonary, GI, Skin and Neurologic except as noted in HPI or medical history.    Past Medical History:   Diagnosis Date     Anxiety      Bladder spasms      Depression 2009     Exercise-induced asthma 2009    Since resolved.     Narcolepsy 2009     Seizure  (H) 2 approx [id number]    Secondary to Wellbutrin and stimulant use     Past Surgical History:   Procedure Laterality Date     ECTOPIC PREGNANCY SURGERY       GYN SURGERY  ectopic pregnancy     HC TOOTH EXTRACTION W/FORCEP       PHOTOREFRACTIVE KERATECTOMY Bilateral     March 2021 - Dr. Walden     Family History   Problem Relation Age of Onset     Thyroid Disease Cousin      Melanoma No family hx of      Skin Cancer No family hx of      Diabetes No family hx of      Hypertension No family hx of      Macular Degeneration No family hx of      Glaucoma No family hx of      Cancer No family hx of          Objective  There were no vitals taken for this visit.      General: healthy, alert and in no acute distress.      HEENT: no scleral icterus or conjunctival erythema.     Skin: no suspicious lesions or rash. No jaundice.     CV: regular rhythm by palpation, 2+ distal pulses.    Resp: normal respiratory effort without conversational dyspnea.     Psych: normal mood and affect.      Gait: nonantalgic, appropriate coordination and balance.     Neuro:        - Sensation to light touch:    - Slightly diminished sensation in the median nerve distribution distal to the wrist. Otherwise SILT in all other nerve distributions.        - Special tests:   - Spurling's: Negative    - Tinel's at the wrist:  Neg    - Tinel's at the elbow:  Neg    - Stressed Phalen's: Positive on the right    MSK - Wrist/Hand:       - Inspection:    - No significant swelling, erythema, warmth, ecchymosis, lesion, or atrophy noted.        - ROM:    - Full AROM/PROM with pain during forced passive wrist extension.       - Palpation:    - TTP at the dorsal scapholunate joint, carpal tunnel  - NTTP elsewhere.        - Strength:  (*antalgic)  RUE LUE  - Shoulder Abduction   5 5   - Shoulder Flexion   5 5   - Shoulder Internal Rotation  5 5   - Shoulder External Rotation  5 5  - Elbow Flexion   5 5  - Elbow Extension   5 5  - Forearm Pronation   5 5  -  Forearm Supination   5 5  - Wrist Extension   5 5  - Wrist Flexion    5 5  - FDI     5 5  - ADM     5 5  - FPL     5 5  - APB     5 5  - EIP     5 5  - EDC     5 5  - APL/EPB    5 5          - Special tests:        - CMC grind:  Neg    - Finkelstein's:  Neg   - TFCC compression:  Neg    - Martinez's:  Neg     Radiology  I independently reviewed today's new relevant imaging, with the following interpretation:  - XR R wrist shows normal anatomic alignment without significant degenerative changes, no acute fractures or dislocations.      Assessment  1. Wrist sprain, right, initial encounter    2. Carpal tunnel syndrome of right wrist        Plan  Lashonda Rodriguez is a 43 year old female that presents with subacute right wrist pain.  Multiple areas of pain, most prominently in the dorsal wrist with push-ups and weightbearing through the wrist, also some pain on the volar surface of the wrist with wrist repetitive wrist flexion/extension motions associated with paresthesias in the median nerve distribution.  History and physical exam appear most consistent with right wrist sprain/tendinopathy and mild carpal tunnel syndrome on the right. Discussed the nature of the condition and treatment options and mutually agreed upon the following plan:    - Imaging:          - Reviewed relevant imaging in the chart.  - XR R wrist ordered today pre-clinic and independently interpreted by myself.    - Reviewed results and images with patient.   - Medications:          - Discussed pharmacologic options for pain relief.   - May use NSAIDs (Ibuprofen, Naproxen) or Acetaminophen (Tylenol) as needed for pain control.   - May also use topical medications such as lidocaine, IcyHot, BioFreeze, or Voltaren gel as needed for pain control.  Recommend using Voltaren gel up to 4 times per day as needed over the carpal tunnel and dorsal wrist.  - Injections:          - Discussed possible injection options and alternatives.    - Options include  corticosteroid injections of the carpal tunnel or wrist joint.    - Deferred injections today and will consider them in the future as needed.   - Therapy:          - Discussed the benefits of physical therapy vs home exercise program for optimization of range of motion, flexibility, strength, stability and function.   - Preference is for a home exercise program.   - Home Exercise Program that includes median nerve glides given today in clinic and recommendation given to perform HEP daily and after exacerbations.  - Modalities:          - May use ice, heat, massage or other modalities as needed.   - Bracing:          - Discussed bracing options and recommend using a neutral wrist splint at night for the next 6 weeks to help decompress the median nerve in the carpal ligaments of the wrist joint.  - Options presented in clinic and choice given to take our brace from clinic or purchase an equivalent brace from an outside source.   - Activity:          - Encouraged to remain active and participate in regular activities as symptoms allow.  Avoid exacerbating activities, or modify to keep more of a neutral wrist when pushing/pulling/bearing weight through the wrist.   - Follow up:          - As needed in the future for re-evaluation and update to treatment plan, or sooner for new/worsening symptoms.  - Patient has clinic contact information for questions or concerns.       Brayden Rios DO, LARRY  [facility] - Sports Medicine  [facility] - Department of Orthopedic Surgery       Disclaimer:  This note was prepared and written using Dragon Medical dictation software. As a result, there may be errors in the script that have gone undetected. Please consider this when interpreting the information in this note.

## 2023-05-11 NOTE — TELEPHONE ENCOUNTER
DIAGNOSIS: (RT) Wrist pain - has been ongoing for a while and sometimes gets better but is very bothersome now   APPOINTMENT DATE: 05/12/2023   NOTES STATUS DETAILS   OFFICE NOTE from referring provider N/A    OFFICE NOTE from other specialist N/A    DISCHARGE SUMMARY from hospital N/A    DISCHARGE REPORT from the ER N/A    OPERATIVE REPORT N/A    EMG report N/A    MEDICATION LIST N/A    MRI N/A    DEXA (osteoporosis/bone health) N/A    CT SCAN N/A    XRAYS (IMAGES & REPORTS) N/A

## 2023-05-12 ENCOUNTER — PRE VISIT (OUTPATIENT)
Dept: ORTHOPEDICS | Facility: CLINIC | Age: 43
End: 2023-05-12
Payer: COMMERCIAL

## 2023-05-12 ENCOUNTER — OFFICE VISIT (OUTPATIENT)
Dept: ORTHOPEDICS | Facility: CLINIC | Age: 43
End: 2023-05-12
Payer: COMMERCIAL

## 2023-05-12 ENCOUNTER — ANCILLARY PROCEDURE (OUTPATIENT)
Dept: GENERAL RADIOLOGY | Facility: CLINIC | Age: 43
End: 2023-05-12
Attending: STUDENT IN AN ORGANIZED HEALTH CARE EDUCATION/TRAINING PROGRAM
Payer: COMMERCIAL

## 2023-05-12 DIAGNOSIS — G56.01 CARPAL TUNNEL SYNDROME OF RIGHT WRIST: ICD-10-CM

## 2023-05-12 DIAGNOSIS — M25.531 RIGHT WRIST PAIN: Primary | ICD-10-CM

## 2023-05-12 DIAGNOSIS — M25.531 RIGHT WRIST PAIN: ICD-10-CM

## 2023-05-12 DIAGNOSIS — S63.501A WRIST SPRAIN, RIGHT, INITIAL ENCOUNTER: Primary | ICD-10-CM

## 2023-05-12 PROCEDURE — 99214 OFFICE O/P EST MOD 30 MIN: CPT | Performed by: STUDENT IN AN ORGANIZED HEALTH CARE EDUCATION/TRAINING PROGRAM

## 2023-05-12 PROCEDURE — 73110 X-RAY EXAM OF WRIST: CPT | Mod: RT | Performed by: RADIOLOGY

## 2023-05-12 NOTE — PATIENT INSTRUCTIONS
Brant Lashonda Rodriguez ,     A copy of your assessment and our treatment plan that we discussed together is included below, as written in your medical chart.   If you have any questions, please feel free to call the clinic.     --------------------------------------------------  Lashonda Rodriguez is a 43 year old female that presents with subacute right wrist pain.  Multiple areas of pain, most prominently in the dorsal wrist with push-ups and weightbearing through the wrist, also some pain on the volar surface of the wrist with wrist repetitive wrist flexion/extension motions associated with paresthesias in the median nerve distribution.  History and physical exam appear most consistent with right wrist sprain/tendinopathy and mild carpal tunnel syndrome on the right. Discussed the nature of the condition and treatment options and mutually agreed upon the following plan:    - Imaging:          - Reviewed relevant imaging in the chart.  - XR R wrist ordered today pre-clinic and independently interpreted by myself.    - Reviewed results and images with patient.   - Medications:          - Discussed pharmacologic options for pain relief.   - May use NSAIDs (Ibuprofen, Naproxen) or Acetaminophen (Tylenol) as needed for pain control.   - May also use topical medications such as lidocaine, IcyHot, BioFreeze, or Voltaren gel as needed for pain control.  Recommend using Voltaren gel up to 4 times per day as needed over the carpal tunnel and dorsal wrist.  - Injections:          - Discussed possible injection options and alternatives.    - Options include corticosteroid injections of the carpal tunnel or wrist joint.    - Deferred injections today and will consider them in the future as needed.   - Therapy:          - Discussed the benefits of physical therapy vs home exercise program for optimization of range of motion, flexibility, strength, stability and function.   - Preference is for a home exercise program.    - Home Exercise Program that includes median nerve glides given today in clinic and recommendation given to perform HEP daily and after exacerbations.  - Modalities:          - May use ice, heat, massage or other modalities as needed.   - Bracing:          - Discussed bracing options and recommend using a neutral wrist splint at night for the next 6 weeks to help decompress the median nerve in the carpal ligaments of the wrist joint.  - Options presented in clinic and choice given to take our brace from clinic or purchase an equivalent brace from an outside source.   - Activity:          - Encouraged to remain active and participate in regular activities as symptoms allow.  Avoid exacerbating activities, or modify to keep more of a neutral wrist when pushing/pulling/bearing weight through the wrist.   - Follow up:          - As needed in the future for re-evaluation and update to treatment plan, or sooner for new/worsening symptoms.  - Patient has clinic contact information for questions or concerns.  --------------------------------------------------    It was a pleasure seeing you today. Thank you for choosing [facility] for your care.       Brayden Rios DO, CAQSM  [facility] - Sports Medicine  [facility] - Department of Orthopedic Surgery     Disclaimer:  This note was prepared and written using Dragon Medical dictation software. As a result, there may be errors in the script that have gone undetected. Please consider this when interpreting the information in this note.     -------------------------------------------------------------------    Carpal Tunnel Exercises    You may do all of these exercises right away.    Wrist range of motion    Flexion: Gently bend your wrist forward. Hold for 5 seconds. Do 2 sets of 15.    Extension: Gently bend your wrist backward. Hold this position 5 seconds. Do 2 sets of 15.    Side to side: Gently move your wrist from side to side (a  handshake motion). Hold for 5 seconds in each direction. Do 2 sets of 15.    Wrist stretch: Press the back of the hand on your injured side with your other hand to help bend your wrist. Hold for 15 to 30 seconds. Next, stretch the hand back by pressing the fingers in a backward direction. Hold for 15 to 30 seconds. Keep the arm on your injured side straight during this exercise. Do 3 sets.    Mid-trap exercise: Lie on your stomach on a firm surface and place a folded pillow underneath your chest. Place your arms out straight to your sides with your elbows straight and thumbs toward the ceiling. Slowly raise your arms toward the ceiling as you squeeze your shoulder blades together. Lower slowly. Do 3 sets of 15. As the exercise gets easier to do, hold soup cans or small weights in your hands.    Pectoralis stretch:  an open doorway or corner with both hands slightly above your head on the door frame or wall. Slowly lean forward until you feel a stretch in the front of your shoulders. Hold 15 to 30 seconds. Repeat 3 times.    Scalene stretch: Sit or stand and clasp both hands behind your back. Lower your left shoulder and tilt your head toward the right until you feel a stretch. Hold this position for 15 to 30 seconds and then come back to the starting position. Then lower your right shoulder and tilt your head toward the left. Hold for 15 to 30 seconds. Repeat 3 times on each side.    Thoracic extension: Sit in a chair and clasp both arms behind your head. Gently arch backward and look up toward the ceiling. Repeat 10 times. Do this several times each day.    Scapular squeeze: While sitting or standing with your arms by your sides, squeeze your shoulder blades together and hold for 5 seconds. Do 2 sets of 15.    Wrist extension: Hold a soup can or hammer handle in your hand with your palm facing down. Slowly bend your wrist up. Slowly lower the weight down into the starting position. Do 2 sets of 15.  Gradually increase the weight of the object you are holding.     strengthening: Squeeze a soft rubber ball and hold the squeeze for 5 seconds. Do 2 sets of 15.

## 2023-05-12 NOTE — LETTER
2023         RE: Lashonda Rodriguez  [address]  [place] [address]        Dear Colleague,    Thank you for referring your patient, Lashonda Rodriguez, to the [facility] [place]. Please see a copy of my visit note below.    Lashonda Rodriguez  :  1980  DOS: 2023  MRN: [id number]  PCP: Jazmin Guy    Sports Medicine Clinic Visit      HPI  Lashonda Rodriguez is a 43 year old female who is seen as a self referral presenting with right wrist pain.    - Mechanism of Injury:  No inciting injury.   - Prior evaluation:  None.    - Pain Character:  Pain has been present for 3 months.  She experiences pain in the dorsal wrist with weightbearing activities like push-ups and pushing doors open.  Also feels volar wrist pain with paresthesias in the median nerve distribution with repetitive wrist motion activities.  Occasionally will radiate up the forearm.  - Endorses:  numbness, tingling in the median nerve distribution distal to the wrist.  Handgrip weakness occasionally.  - Denies:  swelling, clicking, popping, radicular shooting pain.  - Alleviating factors: Rest, positive flick sign, soft wrist brace helps a little, activity modifications.  - Aggravating factors:  lifting heavy objects, handgrip activities, sleeping position, push-ups  - Treatments tried:  rest, soft wrist brace, ibuprofen    - Patient Goals:  get a formal diagnosis, understand the cause of the symptoms, be able to manage the symptoms successfully, discuss treatment options  - Social History: currently employed as desk job     - Pertinent PMH:  Saw Dr. Onofre and 2020 for right-sided posterior elbow impingement and supinator irritation, possible PIN neuropathy based on symptoms.  Also has been seen by Ortho for left rotator cuff tendinopathy and mechanical cLBP.      Review of Systems  Musculoskeletal: as above  Remainder of review of systems is negative including  constitutional, CV, pulmonary, GI, Skin and Neurologic except as noted in HPI or medical history.    Past Medical History:   Diagnosis Date     Anxiety      Bladder spasms      Depression 01/05/2009     Exercise-induced asthma 06/11/2009    Since resolved.     Narcolepsy 12/28/2009     Seizure (H) 2 approx [id number]    Secondary to Wellbutrin and stimulant use     Past Surgical History:   Procedure Laterality Date     ECTOPIC PREGNANCY SURGERY       GYN SURGERY  ectopic pregnancy     HC TOOTH EXTRACTION W/FORCEP       PHOTOREFRACTIVE KERATECTOMY Bilateral     March 2021 - Dr. Walden     Family History   Problem Relation Age of Onset     Thyroid Disease Cousin      Melanoma No family hx of      Skin Cancer No family hx of      Diabetes No family hx of      Hypertension No family hx of      Macular Degeneration No family hx of      Glaucoma No family hx of      Cancer No family hx of          Objective  There were no vitals taken for this visit.    General: healthy, alert and in no acute distress.    HEENT: no scleral icterus or conjunctival erythema.   Skin: no suspicious lesions or rash. No jaundice.   CV: regular rhythm by palpation, 2+ distal pulses.  Resp: normal respiratory effort without conversational dyspnea.   Psych: normal mood and affect.    Gait: nonantalgic, appropriate coordination and balance.     Neuro:        - Sensation to light touch:    - Slightly diminished sensation in the median nerve distribution distal to the wrist. Otherwise SILT in all other nerve distributions.        - Special tests:   - Spurling's: Negative    - Tinel's at the wrist:  Neg    - Tinel's at the elbow:  Neg    - Stressed Phalen's: Positive on the right    MSK - Wrist/Hand:       - Inspection:    - No significant swelling, erythema, warmth, ecchymosis, lesion, or atrophy noted.        - ROM:    - Full AROM/PROM with pain during forced passive wrist extension.       - Palpation:    - TTP at the dorsal scapholunate joint,  carpal tunnel  - NTTP elsewhere.        - Strength:  (*antalgic)  RUE LUE  - Shoulder Abduction   5 5   - Shoulder Flexion   5 5   - Shoulder Internal Rotation  5 5   - Shoulder External Rotation  5 5  - Elbow Flexion   5 5  - Elbow Extension   5 5  - Forearm Pronation   5 5  - Forearm Supination   5 5  - Wrist Extension   5 5  - Wrist Flexion    5 5  - FDI     5 5  - ADM     5 5  - FPL     5 5  - APB     5 5  - EIP     5 5  - EDC     5 5  - APL/EPB    5 5          - Special tests:        - CMC grind:  Neg    - Finkelstein's:  Neg   - TFCC compression:  Neg    - Matrinez's:  Neg     Radiology  I independently reviewed today's new relevant imaging, with the following interpretation:  - XR R wrist shows normal anatomic alignment without significant degenerative changes, no acute fractures or dislocations.      Assessment  1. Wrist sprain, right, initial encounter    2. Carpal tunnel syndrome of right wrist        Plan  Lashonda Rodriguez is a 43 year old female that presents with subacute right wrist pain.  Multiple areas of pain, most prominently in the dorsal wrist with push-ups and weightbearing through the wrist, also some pain on the volar surface of the wrist with wrist repetitive wrist flexion/extension motions associated with paresthesias in the median nerve distribution.  History and physical exam appear most consistent with right wrist sprain/tendinopathy and mild carpal tunnel syndrome on the right. Discussed the nature of the condition and treatment options and mutually agreed upon the following plan:    - Imaging:          - Reviewed relevant imaging in the chart.  - XR R wrist ordered today pre-clinic and independently interpreted by myself.    - Reviewed results and images with patient.   - Medications:          - Discussed pharmacologic options for pain relief.   - May use NSAIDs (Ibuprofen, Naproxen) or Acetaminophen (Tylenol) as needed for pain control.   - May also use topical medications such as  lidocaine, IcyHot, BioFreeze, or Voltaren gel as needed for pain control.  Recommend using Voltaren gel up to 4 times per day as needed over the carpal tunnel and dorsal wrist.  - Injections:          - Discussed possible injection options and alternatives.    - Options include corticosteroid injections of the carpal tunnel or wrist joint.    - Deferred injections today and will consider them in the future as needed.   - Therapy:          - Discussed the benefits of physical therapy vs home exercise program for optimization of range of motion, flexibility, strength, stability and function.   - Preference is for a home exercise program.   - Home Exercise Program that includes median nerve glides given today in clinic and recommendation given to perform HEP daily and after exacerbations.  - Modalities:          - May use ice, heat, massage or other modalities as needed.   - Bracing:          - Discussed bracing options and recommend using a neutral wrist splint at night for the next 6 weeks to help decompress the median nerve in the carpal ligaments of the wrist joint.  - Options presented in clinic and choice given to take our brace from clinic or purchase an equivalent brace from an outside source.   - Activity:          - Encouraged to remain active and participate in regular activities as symptoms allow.  Avoid exacerbating activities, or modify to keep more of a neutral wrist when pushing/pulling/bearing weight through the wrist.   - Follow up:          - As needed in the future for re-evaluation and update to treatment plan, or sooner for new/worsening symptoms.  - Patient has clinic contact information for questions or concerns.       Brayden Rios DO, LARRY  [facility] - Sports Medicine  [facility] - Department of Orthopedic Surgery       Disclaimer:  This note was prepared and written using Dragon Medical dictation software. As a result, there may be errors in the script that  have gone undetected. Please consider this when interpreting the information in this note.       Again, thank you for allowing me to participate in the care of your patient.        Sincerely,        Brayden Rios, DO

## 2023-05-24 ENCOUNTER — VIRTUAL VISIT (OUTPATIENT)
Dept: PSYCHIATRY | Facility: CLINIC | Age: 43
End: 2023-05-24
Attending: NURSE PRACTITIONER
Payer: COMMERCIAL

## 2023-05-24 DIAGNOSIS — G47.419 PRIMARY NARCOLEPSY WITHOUT CATAPLEXY: ICD-10-CM

## 2023-05-24 PROCEDURE — 99214 OFFICE O/P EST MOD 30 MIN: CPT | Performed by: NURSE PRACTITIONER

## 2023-05-24 RX ORDER — DEXTROAMPHETAMINE SACCHARATE, AMPHETAMINE ASPARTATE, DEXTROAMPHETAMINE SULFATE AND AMPHETAMINE SULFATE 5; 5; 5; 5 MG/1; MG/1; MG/1; MG/1
TABLET ORAL
Qty: 90 TABLET | Refills: 0 | Status: SHIPPED | OUTPATIENT
Start: 2023-06-23 | End: 2023-08-24

## 2023-05-24 RX ORDER — DEXTROAMPHETAMINE SACCHARATE, AMPHETAMINE ASPARTATE, DEXTROAMPHETAMINE SULFATE AND AMPHETAMINE SULFATE 5; 5; 5; 5 MG/1; MG/1; MG/1; MG/1
TABLET ORAL
Qty: 90 TABLET | Refills: 0 | Status: SHIPPED | OUTPATIENT
Start: 2023-07-21 | End: 2023-08-24

## 2023-05-24 RX ORDER — DEXTROAMPHETAMINE SACCHARATE, AMPHETAMINE ASPARTATE, DEXTROAMPHETAMINE SULFATE AND AMPHETAMINE SULFATE 5; 5; 5; 5 MG/1; MG/1; MG/1; MG/1
TABLET ORAL
Qty: 90 TABLET | Refills: 0 | Status: SHIPPED | OUTPATIENT
Start: 2023-05-26 | End: 2023-08-24

## 2023-05-24 NOTE — NURSING NOTE
Is the patient currently in the state of [place]? YES    Visit mode:VIDEO    If the visit is dropped, the patient can be reconnected by: VIDEO VISIT: Text to cell phone: [phone]    Will anyone else be joining the visit? NO      How would you like to obtain your AVS? MyChart    Are changes needed to the allergy or medication list? YES: Adderall 20 mg is listed 3 times please remove older duplicates. Also, pt no longer takes vit b12 po & lamotrigine 150 mg. Both can be removed from med list.    Reason for visit: RECHECK    Patient will complete questionnaires prior to joining video.    Rosa Slater, JESSEE on 5/24/2023 at 8:17 AM

## 2023-05-24 NOTE — PATIENT INSTRUCTIONS
**For crisis resources, please see the information at the end of this document**   Patient Education    Thank you for coming to the [facility] [place] [facility].     Lab Testing:  If you had lab testing today and your results are reassuring or normal they will be mailed to you or sent through [employer] within 7 days. If the lab tests need quick action we will call you with the results. The phone number we will call with results is # [phone]. If this is not the best number please call our clinic and change the number.     Medication Refills:  If you need any refills please call your pharmacy and they will contact us. Our fax number for refills is [phone].   Three business days of notice are needed for general medication refill requests.   Five business days of notice are needed for controlled substance refill requests.   If you need to change to a different pharmacy, please contact the new pharmacy directly. The new pharmacy will help you get your medications transferred.     Contact Us:  Please call [phone] during business hours (8-5:00 M-F).   If you have medication related questions after clinic hours, or on the weekend, please call [phone].     Financial Assistance [phone]   Medical Records [phone]       MENTAL HEALTH CRISIS RESOURCES:  For a emergency help, please call 911 or go to the nearest Emergency Department.     Emergency Walk-In Options:   Em[facility] Unit @ [facility] ([place]): [phone] - Specialized mental health emergency area designed to be calming  [facility] ([place]): [phone]  [facility] ([place]): [phone]  [facility]): [phone]    [facility] Crisis Information:   [place]: [phone]  [place]: [phone]  [place] ([place]) - Adult: [phone]     Child: [phone]  [place] - Adult: [phone]     Child: [phone]  [place]:  [phone]  List of all [facility] resources:   https://[place].gov/dhs/people-we-serve/adults/health-care/mental-health/resources/crisis-contacts.jsp    National Crisis Information:   Crisis Text Line: Text  [place]  to 745975  Suicide & Crisis Lifeline: 988  National Suicide Prevention Lifeline: 5-538-028-TALK (1-664.951.5616)       For online chat options, visit https://suicidepreventionlifeline.org/chat/  Poison Control Center: 7-467-436-7447  Trans Lifeline: 1-224-954-6553 - Hotline for transgender people of all ages  The Lb Project: 7-974-388-0292 - Hotline for LGBT youth     For Non-Emergency Support:   Fast Tracker: Mental Health & Substance Use Disorder Resources -   https://www.[place].org/

## 2023-05-24 NOTE — PROGRESS NOTES
Virtual Visit Details    Type of service:  Video Visit     Originating Location (pt. Location): Home  Distant Location (provider location):  On-site  Platform used for Video Visit: [facility]  Psychiatry Clinic  PSYCHIATRIC PROGRESS NOTE  telemedicine       Lashonda Rodriguez is a 43 year old female who prefers the name Josi and pronouns she, her.  Therapist: couples counseling as needed  PCP: Selma Alba  Other Providers: None     PREVIOUS PSYCH MED TRIALS:  - Provigil (limited effect, 2-3 month trial)  - Nuvigil 250mg (trialed in 2013-ineffective)  - buspar 30mg (unknown)  - Wellbutrin XL (taken with Adderall, two seizures in 2009/2010)  - AMT 10mg for skin picking (unknown)  - NAC (ineffective, unsure about her compliance)     Pertinent Background:  See previous notes.  Psych critical item history includes [no critical items].      Interim History                                                                                                       The patient is a good historian, reports good treatment adherence.    Last seen 1/05/2023 when she chose to trial reducing lamotrigine from 150mg to 125mg daily, continue Adderall 40mg QAM, 20mg Qnoon .    Since the last visit, she's been good.   - between visits, she chose to taper off lamotrigine by 50mg every two weeks to stop, she ended taper in mid March, some irritability as she stepped down  - lamotrigine was initially prescribed for seizures (while taking Wellbutrin and Adderall) and skin picking  - during lamotrigine taper, she stopped her IUD after her  had a vasectomy  - endorses mild burnout at work, she's less engaged than is usual for her, feeling antsy  - holding a graduation party for her youngest daughter Dseiree who is headed to college, they are adjusting to being empty nesters  - considers selling their house and buying a duplex to start rental income  - she's working in Procurement, she considers  a Masters her company will pay for  - her 21yo daughter Leticia moved back in with Celeste's Dad, she likes her job  - getting back to her exercise at [employer]  - 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    Recent Symptoms:   Depression: PHQ 0, she denies significant depression symptoms  Anxiety: skillfully  her worth from work identity, deliberately looking at her next steps  - infrequent skin picking     ADVERSE EFFECTS: none  MEDICAL CONCERNS: none today     APPETITE: OK, 171# in Feb 2023, eats vegan   SLEEP: with melatonin 10mg, sleeping restlessly 6-7 hours most nights, wakes to the needs of her dog     Substance Use:  Alcohol- limits, prefers craft beer  Caffeine- takes No Doze with Adderall for narcolepsy        Social/ Family History                                     FINANCIAL SUPPORT- 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 2015) and two daughters      LEGAL- None  EARLY HISTORY/ EDUCATION- Grew up in [place] with half siblings. BS in Biology from [employer]  SOCIAL/ SPIRITUAL SUPPORT- support from family, friends       CULTURAL INFLUENCES/ IMPACT- UNKNOWN       TRAUMA HISTORY- None  FEELS SAFE AT HOME- Yes  FAMILY HISTORY-  daughter Leticia- sertraline for anxiety    Medical / Surgical History                                 Patient Active Problem List   Diagnosis     Major depressive disorder, recurrent episode (H)     Narcolepsy     Bladder spasms     Impulse control disorder     Hx of psychiatric care     Melasma     Rotator cuff tendinitis, left     Diffuse photodamage of skin       Past Surgical History:   Procedure Laterality Date     ECTOPIC PREGNANCY SURGERY       GYN SURGERY  ectopic pregnancy     HC TOOTH EXTRACTION W/FORCEP       PHOTOREFRACTIVE KERATECTOMY Bilateral     March 2021 - Dr. Walden      Medical Review of Systems             Pregnant/  breastfeeding- No    Contraception- none, her  had a vasectomy  A comprehensive review of systems was performed and is negative other than noted in the HPI.    Denies head trauma, LOC.      While taking Wellbutrin, history of general tonic clonic seizure 9/20/2009 without aura with postictal confusion; second seizure while with SO on 1/23/10 (tonic clonic; foaming at the mouth with post ictal confusion and lethargy. Narcolepsy diagnosed about 2004. Remote history of febrile convulsion before 3yo. Her daughter has a history of febrile convulsions.     Allergy    Wellbutrin [bupropion] and Pcn [penicillins]     Wellbutrin- two seizures while taking Wellbutrin and Adderall    Current Medications        Current Outpatient Medications   Medication Sig Dispense Refill     amphetamine-dextroamphetamine (ADDERALL) 20 MG tablet Take two (2) tabs in the morning and one (1) tab at noon 90 tablet 0     azelaic acid (FINACIA) 15 % external gel Apply once daily 50 g 1     caffeine (NO-DOZE) 200 MG TABS Take 1 tablet (200 mg) by mouth 2 times daily (Patient taking differently: Take 200 mg by mouth 2 times daily 400 mg qam and 200 qnoon) 150 tablet      fluocin-hydroquinone-tretinoin 0.01-4-0.05 % CREA Apply twice daily to the face 30 g 3     melatonin 5 MG tablet Take 10 mg by mouth nightly as needed        Multiple Vitamins-Minerals (MULTIVITAMIN ADULT PO) Take by mouth daily        amphetamine-dextroamphetamine (ADDERALL) 20 MG tablet Take two (2) tabs in the morning and one (1) tab at noon 90 tablet 0     amphetamine-dextroamphetamine (ADDERALL) 20 MG tablet Take two (2) tabs in the morning and one (1) tab at noon 90 tablet 0     Cyanocobalamin (VITAMIN B 12 PO)  (Patient not taking: Reported on 5/24/2023)       lamoTRIgine (LAMICTAL) 150 MG tablet  (Patient not taking: Reported on 5/24/2023)       Vitals            There were no vitals taken for this visit.   Mental Status Exam             Alertness: alert  and  oriented  Appearance: N/A  Behavior/Demeanor: cooperative, pleasant and calm, with N/A eye contact   Speech: normal and regular rate and rhythm  Language: no problems  Psychomotor: N/A  Mood: description consistent with euthymia  Affect: appropriate; was congruent to mood; was congruent to content  Thought Process/Associations: unremarkable  Thought Content:  Reports none;  Denies suicidal ideation, violent ideation, delusions, preoccupations, obsessions , phobia  and magical thinking  Perception:  Reports none;  Denies auditory hallucinations, visual hallucinations, visual distortion seen as shadows , depersonalization and derealization  Insight: fair  Judgment: good  Cognition: (6) does  appear grossly intact; formal cognitive testing was not done  Gait/Station and/or Muscle Strength/Tone: N/A    Labs and Data                          Rating Scales:      Answers for HPI/ROS submitted by the patient on 5/24/2023  If you checked off any problems, how difficult have these problems made it for you to do your work, take care of things at home, or get along with other people?: Not difficult at all  PHQ9 TOTAL SCORE: 0    PHQ9 Today:        1/5/2023     7:43 AM 2/22/2023    10:22 AM 5/24/2023     8:21 AM   PHQ   PHQ-9 Total Score 0 0 0   Q9: Thoughts of better off dead/self-harm past 2 weeks Not at all Not at all Not at all     Diagnosis      primary narcolepsy 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 (diagnosed by sleep study in 2007)  recurrent MDD in remission     Assessment           Today the following issues were addressed:      : 5/2023- Adderall 20mg #90 last filled on 4/28/2023     PSYCHOTROPIC DRUG INTERACTIONS:  - none with Adderall     Drug Interaction Management: Monitoring for adverse effects, routine vitals, using lowest therapeutic dose of [psychotropics] and patient is aware of risks    Plan                                                                                                                       1) she chooses  to continue off lamotrigine and continue Adderall 40mg QAM, 20mg Qnoon     - narcolepsy (2004) and seizures (2009/10 while taking Adderall + Wellbutrin) were confirmed by neurology  - takes No Doze 200mg BID for narcolepsy      RTC: 6 months, sooner as need    CRISIS NUMBERS:   Provided routinely in AVS.    Treatment Risk Statement:  The patient understands the risks, benefits, adverse effects and alternatives. Agrees to treatment with the capacity to do so. No medical contraindications to treatment. Agrees to call clinic for any problems. The patient understands to call 911 or go to the nearest ED if life threatening or urgent symptoms occur.     WHODAS 2.0  TODAY total score = N/A; [a 12-item WHODAS 2.0 assessment was not completed by the pt today and/or recorded in EPIC].    PROVIDER:  AYSE Dumont CNP

## 2023-06-01 DIAGNOSIS — L90.5 SCARRING: ICD-10-CM

## 2023-06-02 ENCOUNTER — HEALTH MAINTENANCE LETTER (OUTPATIENT)
Age: 43
End: 2023-06-02

## 2023-06-06 RX ORDER — AZELAIC ACID 0.15 G/G
GEL TOPICAL
Qty: 50 G | Refills: 6 | Status: SHIPPED | OUTPATIENT
Start: 2023-06-06 | End: 2024-01-25

## 2023-06-06 NOTE — TELEPHONE ENCOUNTER
4/6/2023  [facility] Sabrina Winston MD  Dermatology     RF process #1

## 2023-06-12 DIAGNOSIS — F33.42 MAJOR DEPRESSIVE DISORDER, RECURRENT EPISODE, IN FULL REMISSION (H): ICD-10-CM

## 2023-06-12 RX ORDER — LAMOTRIGINE 150 MG/1
TABLET ORAL
Qty: 90 TABLET | Refills: 1 | OUTPATIENT
Start: 2023-06-12

## 2023-06-25 ENCOUNTER — E-VISIT (OUTPATIENT)
Dept: URGENT CARE | Facility: URGENT CARE | Age: 43
End: 2023-06-25
Payer: COMMERCIAL

## 2023-06-25 DIAGNOSIS — H57.11 EYE PAIN, RIGHT: Primary | ICD-10-CM

## 2023-06-25 PROCEDURE — 99207 PR NO BILLABLE SERVICE THIS VISIT: CPT | Performed by: PHYSICIAN ASSISTANT

## 2023-06-25 NOTE — PATIENT INSTRUCTIONS
Dear Lashonda Rodriguez,    We are sorry you are not feeling well. Based on the responses you provided, it is recommended that you be seen in-person in urgent care so we can better evaluate your symptoms. Please click here to find the nearest urgent care location to you.   You will not be charged for this Visit. Thank you for trusting us with your care.    Anuel Barrientos PA-C

## 2023-07-20 ENCOUNTER — OFFICE VISIT (OUTPATIENT)
Dept: FAMILY MEDICINE | Facility: CLINIC | Age: 43
End: 2023-07-20
Payer: COMMERCIAL

## 2023-07-20 VITALS
TEMPERATURE: 98 F | SYSTOLIC BLOOD PRESSURE: 113 MMHG | WEIGHT: 171.04 LBS | BODY MASS INDEX: 27.49 KG/M2 | HEIGHT: 66 IN | OXYGEN SATURATION: 96 % | DIASTOLIC BLOOD PRESSURE: 73 MMHG | HEART RATE: 76 BPM

## 2023-07-20 DIAGNOSIS — F41.8 PERFORMANCE ANXIETY: Primary | ICD-10-CM

## 2023-07-20 DIAGNOSIS — N92.6 ENCOUNTER FOR MANAGEMENT OF MENSTRUAL ISSUE: ICD-10-CM

## 2023-07-20 RX ORDER — PROPRANOLOL HYDROCHLORIDE 10 MG/1
10-20 TABLET ORAL DAILY PRN
Qty: 30 TABLET | Refills: 3 | Status: SHIPPED | OUTPATIENT
Start: 2023-07-20

## 2023-07-20 NOTE — PROGRESS NOTES
"CC: RECHECK (Discuss mirena IUD and requesting propanolol medication)      SUBJECTIVE: Josi is a 43 year old female who comes in with the following concerns:    IUD discussion. Mirena IUD removed in 2/22/2023. Had it removed because her  has a vasectomy. But now wants another Mirena IUD inserted for menstrual management.     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 Gets very nervous before performances. Has a friend she was discussing this with. Her friend takes propranolol. She is wondering if this is an option.       OBJECTIVE:   /73   Pulse 76   Temp 98  F (36.7  C)   Ht 1.676 m (5' 5.98\")   Wt 77.6 kg (171 lb 0.6 oz)   LMP 06/26/2023   SpO2 96%   BMI 27.62 kg/m    General: Alert and oriented in no acute distress.  Psych: Mood and affect appropriate.      ASSESSMENT/PLAN:   Lashonda was seen today for recheck.    Performance anxiety  Uses propranolol 10-20 mg daily as needed 30-60 minutes prior to performances. I discussed with the patient the risks, benefits, and alternatives to taking this medication as well as common side effects.   -     propranolol (INDERAL) 10 MG tablet; Take 1-2 tablets (10-20 mg) by mouth daily as needed (performance anxiety)    Encounter for management of menstrual issue  Will RTC for Mirena IUD insertion. Take ibuprofen 600 mg 30-60 minutes prior to IUD.     Worrisome signs and symptoms were discussed with Josi and she was instructed to return to the clinic for concerning symptoms or to call with questions.      Jazmin Guy MD  Family Medicine    "

## 2023-07-26 ENCOUNTER — OFFICE VISIT (OUTPATIENT)
Dept: FAMILY MEDICINE | Facility: CLINIC | Age: 43
End: 2023-07-26
Payer: COMMERCIAL

## 2023-07-26 VITALS
DIASTOLIC BLOOD PRESSURE: 73 MMHG | SYSTOLIC BLOOD PRESSURE: 115 MMHG | OXYGEN SATURATION: 99 % | BODY MASS INDEX: 27.82 KG/M2 | WEIGHT: 173.08 LBS | TEMPERATURE: 98 F | HEART RATE: 69 BPM | HEIGHT: 66 IN

## 2023-07-26 DIAGNOSIS — Z30.430 ENCOUNTER FOR INSERTION OF INTRAUTERINE CONTRACEPTIVE DEVICE: Primary | ICD-10-CM

## 2023-07-26 LAB — HCG UR QL: NEGATIVE

## 2023-07-26 NOTE — PROGRESS NOTES
IUD Insertion Note    Lashonda Rodriguez is a patient of Jazmin Weiner here for IUD placement.   Indication: dysmenorrhea    Counselling: Discussed potential side effects of Paragard, including increased bleeding and cramping, as well as the Mirena, including unpredictable spotting and amenorrhea.  Patient aware to check for strings every month.    Consent: Affirmation of informed consent was signed and scanned into the medical record. Risks, benefits and alternatives were discussed including small risk of uterine perforation during insertion. Patient's questions were elicited and answered.     Labs: UPT negative    Patient chooses this IUD type: Mirena    Technique:   Patient was premedicated with ibuprofen:   Yes  Uterine position was confirmed by bimanual exam: Yes   Using a sterile speculum and equipment, the cervix  was examined.   A GC-Chlamydia probe was collected:   No   The cervix was cleansed with Betadine prep. Tenaculum was applied to cervix with tension to straighten cervical canal. The uterus was sounded to 8 cm. The Mirena insertion apparatus was prepared and introduced into the cervix without significant resistance.  The IUD was placed in the uterus. The strings were trimmed 3 cm below the cervix.   IUD Lot #: [id number]  EBL: minimal  Complications: No  Tolerance: Pt tolerated procedure well and was in stable condition.     Follow up: Pt was instructed to call if heavy bleeding, severe cramping or foul smelling discharge. May take 400-600 mg ibuprofen TID prn for mild cramping. Pt should return in one month for IUD string check. Pt instructed she requires a new IUD device in 8 years.

## 2023-08-24 ENCOUNTER — MYC REFILL (OUTPATIENT)
Dept: PSYCHIATRY | Facility: CLINIC | Age: 43
End: 2023-08-24
Payer: COMMERCIAL

## 2023-08-24 DIAGNOSIS — G47.419 PRIMARY NARCOLEPSY WITHOUT CATAPLEXY: ICD-10-CM

## 2023-08-24 RX ORDER — DEXTROAMPHETAMINE SACCHARATE, AMPHETAMINE ASPARTATE, DEXTROAMPHETAMINE SULFATE AND AMPHETAMINE SULFATE 5; 5; 5; 5 MG/1; MG/1; MG/1; MG/1
TABLET ORAL
Qty: 90 TABLET | Refills: 0 | Status: SHIPPED | OUTPATIENT
Start: 2023-09-21 | End: 2023-11-22

## 2023-08-24 RX ORDER — DEXTROAMPHETAMINE SACCHARATE, AMPHETAMINE ASPARTATE, DEXTROAMPHETAMINE SULFATE AND AMPHETAMINE SULFATE 5; 5; 5; 5 MG/1; MG/1; MG/1; MG/1
TABLET ORAL
Qty: 90 TABLET | Refills: 0 | Status: SHIPPED | OUTPATIENT
Start: 2023-08-24 | End: 2023-11-22

## 2023-08-24 RX ORDER — DEXTROAMPHETAMINE SACCHARATE, AMPHETAMINE ASPARTATE, DEXTROAMPHETAMINE SULFATE AND AMPHETAMINE SULFATE 5; 5; 5; 5 MG/1; MG/1; MG/1; MG/1
TABLET ORAL
Qty: 90 TABLET | Refills: 0 | Status: SHIPPED | OUTPATIENT
Start: 2023-10-19 | End: 2023-11-22

## 2023-08-24 NOTE — TELEPHONE ENCOUNTER
Last seen: 5/24/23  RTC: 6 months  Cancel: none  No-show: none  Next appt: 11/22/23     Medication requested: amphetamine-dextroamphetamine (ADDERALL) 20 MG tablet   Directions: Take two (2) tabs in the morning and one (1) tab at noon   Qty: 90  Last refilled: 7/28, 6/27, 5/26 per [place] PDMP     Medication refill pended and routed to provider.

## 2023-09-22 ENCOUNTER — E-VISIT (OUTPATIENT)
Dept: FAMILY MEDICINE | Facility: CLINIC | Age: 43
End: 2023-09-22
Payer: COMMERCIAL

## 2023-09-22 DIAGNOSIS — N39.0 ACUTE UTI (URINARY TRACT INFECTION): Primary | ICD-10-CM

## 2023-09-22 PROCEDURE — 99421 OL DIG E/M SVC 5-10 MIN: CPT | Performed by: PHYSICIAN ASSISTANT

## 2023-09-22 RX ORDER — NITROFURANTOIN 25; 75 MG/1; MG/1
100 CAPSULE ORAL 2 TIMES DAILY
Qty: 10 CAPSULE | Refills: 0 | Status: SHIPPED | OUTPATIENT
Start: 2023-09-22 | End: 2023-09-27

## 2023-09-22 NOTE — PATIENT INSTRUCTIONS
Dear Lashonda Rodriguez    After reviewing your responses, I've been able to diagnose you with a urinary tract infection, which is a common infection of the bladder with bacteria.  This is not a sexually transmitted infection, though urinating immediately after intercourse can help prevent infections.  Drinking lots of fluids is also helpful to clear your current infection and prevent the next one.      I have sent a prescription for antibiotics to your pharmacy to treat this infection.    It is important that you take all of your prescribed medication even if your symptoms are improving after a few doses.  Taking all of your medicine helps prevent the symptoms from returning.     If your symptoms worsen, you develop pain in your back or stomach, develop fevers, or are not improving in 5 days, please contact your primary care provider for an appointment or visit any of our convenient Walk-in or Urgent Care Centers to be seen, which can be found on our website here.    Thanks again for choosing us as your health care partner,    Edith Neal PA-C

## 2023-11-02 ENCOUNTER — OFFICE VISIT (OUTPATIENT)
Dept: FAMILY MEDICINE | Facility: CLINIC | Age: 43
End: 2023-11-02
Payer: COMMERCIAL

## 2023-11-02 VITALS
TEMPERATURE: 97.1 F | OXYGEN SATURATION: 98 % | BODY MASS INDEX: 27.48 KG/M2 | DIASTOLIC BLOOD PRESSURE: 75 MMHG | WEIGHT: 171 LBS | HEART RATE: 86 BPM | HEIGHT: 66 IN | SYSTOLIC BLOOD PRESSURE: 122 MMHG

## 2023-11-02 DIAGNOSIS — J01.90 ACUTE SINUSITIS WITH SYMPTOMS > 10 DAYS: Primary | ICD-10-CM

## 2023-11-02 DIAGNOSIS — Z29.89 NEED FOR PROPHYLAXIS AGAINST URINARY TRACT INFECTION: ICD-10-CM

## 2023-11-02 RX ORDER — DOXYCYCLINE 100 MG/1
100 CAPSULE ORAL 2 TIMES DAILY
Qty: 14 CAPSULE | Refills: 0 | Status: SHIPPED | OUTPATIENT
Start: 2023-11-02 | End: 2023-11-09

## 2023-11-02 RX ORDER — NITROFURANTOIN 25; 75 MG/1; MG/1
100 CAPSULE ORAL
Qty: 10 CAPSULE | Refills: 0 | Status: SHIPPED | OUTPATIENT
Start: 2023-11-02 | End: 2024-04-22

## 2023-11-02 NOTE — PROGRESS NOTES
"CC: Cold Sx (Last few days pt reports a cough, sore throat, plugged sinus.)      SUBJECTIVE:   Lashonda Rodriguez is a 43 year old female who comes in with symptoms of upper respiratory infection.     Had a cold the last week of October and thought it was improving, but over the last 2 days has gotten worse. Just feels like she has a lot sitting in her lungs. Her symptoms include plugged ears, plugged sinuses, sore throat (thinks from drainage). No fevers or chills. Has lots of drainage. Not really much of a cough. Her cold earlier was sinus pressure and pain. By Saturday she was feeling better, and then Tuesday again symptoms began to come back. No SOB. Treatments tried: Nyquil & Zyrtec last week, which did help. Sick contact at work had similar sx that turned into bronchitis. Took a Covid test yesterday and it was negative.      Has a history of reactive airway disease. Denies any wheezing.     Doesn't currently smoke (quit many years ago).       Also wants to discuss prophylactic abx for UTI. Previously, she had a cystoscopy and had a full evaluation, needed 6 months of UTI suppression. Then used nitrofurazone post-coital for UTI prophylaxis. Last time that was prescribed was about 18 months ago. This was prescribed by gynecology but she is wondering if I can take over Rx. Her most recent UTI was in 9/2023.       OBJECTIVE:  /75   Pulse 86   Temp 97.1  F (36.2  C)   Ht 1.676 m (5' 5.98\")   Wt 77.6 kg (171 lb)   SpO2 98%   BMI 27.61 kg/m     She appears well. Ears normal.  Throat and pharynx normal.  Neck supple. No adenopathy in the neck. Nose is congested. Sinuses non tender. The chest is clear, without wheezes or rales. Heart is regular rate and rhythm.       ASSESSMENT/PLAN:   Josi was seen today for cold sx.    Acute sinusitis with symptoms > 10 days  Due to duration of sx and double sickening, recommend tx with abx. Allergic to PCN (hives). Start doxycyline BID for one week. I discussed with " the patient the risks, benefits, and alternatives to taking this medication as well as common side effects. Continue symptomatic treatment with OTC medications, rest, hydration.  Call or return to clinic prn if these symptoms worsen or fail to improve as anticipated.  -     doxycycline hyclate (VIBRAMYCIN) 100 MG capsule; Take 1 capsule (100 mg) by mouth 2 times daily for 7 days    Need for prophylaxis against urinary tract infection  Has had post-coital abx ppx in the past and has worked well. Reviewed visit notes from 9/22/2023, 10/8/2020 regarding UTIs. Will re-prescribe. I discussed with the patient the risks, benefits, and alternatives to taking this medication as well as common side effects. Discussed if she continues to have UTI despite abx ppx then recommend re-evaluation with urology.   -     nitroFURantoin macrocrystal-monohydrate (MACROBID) 100 MG capsule; Take 1 capsule (100 mg) by mouth once as needed (post-coital)

## 2023-11-22 ENCOUNTER — VIRTUAL VISIT (OUTPATIENT)
Dept: PSYCHIATRY | Facility: CLINIC | Age: 43
End: 2023-11-22
Attending: NURSE PRACTITIONER
Payer: COMMERCIAL

## 2023-11-22 ENCOUNTER — MYC MEDICAL ADVICE (OUTPATIENT)
Dept: FAMILY MEDICINE | Facility: CLINIC | Age: 43
End: 2023-11-22

## 2023-11-22 DIAGNOSIS — G47.419 PRIMARY NARCOLEPSY WITHOUT CATAPLEXY: ICD-10-CM

## 2023-11-22 DIAGNOSIS — B37.31 YEAST INFECTION OF THE VAGINA: Primary | ICD-10-CM

## 2023-11-22 PROCEDURE — 99214 OFFICE O/P EST MOD 30 MIN: CPT | Mod: VID | Performed by: NURSE PRACTITIONER

## 2023-11-22 RX ORDER — FLUCONAZOLE 150 MG/1
150 TABLET ORAL ONCE
Qty: 2 TABLET | Refills: 0 | Status: SHIPPED | OUTPATIENT
Start: 2023-11-22 | End: 2023-11-22

## 2023-11-22 RX ORDER — DEXTROAMPHETAMINE SACCHARATE, AMPHETAMINE ASPARTATE, DEXTROAMPHETAMINE SULFATE AND AMPHETAMINE SULFATE 5; 5; 5; 5 MG/1; MG/1; MG/1; MG/1
TABLET ORAL
Qty: 90 TABLET | Refills: 0 | Status: SHIPPED | OUTPATIENT
Start: 2023-12-28 | End: 2023-12-28

## 2023-11-22 RX ORDER — DEXTROAMPHETAMINE SACCHARATE, AMPHETAMINE ASPARTATE, DEXTROAMPHETAMINE SULFATE AND AMPHETAMINE SULFATE 5; 5; 5; 5 MG/1; MG/1; MG/1; MG/1
TABLET ORAL
Qty: 90 TABLET | Refills: 0 | Status: SHIPPED | OUTPATIENT
Start: 2024-01-25 | End: 2024-03-27

## 2023-11-22 RX ORDER — DEXTROAMPHETAMINE SACCHARATE, AMPHETAMINE ASPARTATE, DEXTROAMPHETAMINE SULFATE AND AMPHETAMINE SULFATE 5; 5; 5; 5 MG/1; MG/1; MG/1; MG/1
TABLET ORAL
Qty: 90 TABLET | Refills: 0 | Status: SHIPPED | OUTPATIENT
Start: 2023-11-30 | End: 2023-12-28

## 2023-11-22 ASSESSMENT — PAIN SCALES - GENERAL: PAINLEVEL: NO PAIN (0)

## 2023-11-22 ASSESSMENT — PATIENT HEALTH QUESTIONNAIRE - PHQ9
SUM OF ALL RESPONSES TO PHQ QUESTIONS 1-9: 2
10. IF YOU CHECKED OFF ANY PROBLEMS, HOW DIFFICULT HAVE THESE PROBLEMS MADE IT FOR YOU TO DO YOUR WORK, TAKE CARE OF THINGS AT HOME, OR GET ALONG WITH OTHER PEOPLE: NOT DIFFICULT AT ALL
SUM OF ALL RESPONSES TO PHQ QUESTIONS 1-9: 2

## 2023-11-22 NOTE — NURSING NOTE
Is the patient currently in the state of [place]? YES    Visit mode:VIDEO    If the visit is dropped, the patient can be reconnected by: VIDEO VISIT: Text to cell phone:   Telephone Information:   Mobile [phone]       Will anyone else be joining the visit? NO  (If patient encounters technical issues they should call [phone]    How would you like to obtain your AVS? MyChart    Are changes needed to the allergy or medication list? No    Reason for visit: RECHECK    Vee GILBERT

## 2023-11-22 NOTE — PROGRESS NOTES
"Virtual Visit Details    Type of service:  Video Visit     Originating Location (pt. Location): Home  Distant Location (provider location):  On-site  Platform used for Video Visit: [facility]  Psychiatry Clinic  PSYCHIATRIC PROGRESS NOTE  telemedicine       Lashonda Rodriguez is a 43 year old female who prefers the name Josi and pronouns she, her.  Therapist: couples counseling as needed  PCP: Selma Alba  Other Providers: None     PREVIOUS PSYCH MED TRIALS:  - Provigil (limited effect, 2-3 month trial)  - Nuvigil 250mg (trialed in 2013-ineffective)  - buspar 30mg (unknown)  - Wellbutrin XL (taken with Adderall, two seizures in 2009/2010)  - AMT 10mg for skin picking (unknown)  - NAC (ineffective, unsure about her compliance)     Pertinent Background:  See previous notes.  Psych critical item history includes [no critical items].      Interim History                                                                                                       The patient is a good historian, reports good treatment adherence.    Last seen 5/24/2023 when she chose to continue Adderall 40mg QAM, 20mg Qnoon .    Since the last visit, she's been good.   - she's been off lamotrigine since Mar 2023, this was initially prescribed for seizures (while taking Wellbutrin and Adderall) and skin picking  - feeling stable, notices she keeps herself busy, her mood dropped \"some emptiness\" a few days when she and her  put their gym membership on hold while packing  - she moved between visits, enjoys their new place, she's 5 min from work in [facility]  - looking forward to their vacation in Dec  - considering options for work, she isn't seeing her work as part of her self worth  - might travel for a a few weeks to a new international office  - 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  - her daughter Leticia is living with her Dad, daughter Desiree is in college  - 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    Recent Symptoms:   Depression: PHQ 2, feeling stable, endorses few days of anhedonia and dysphoria  Anxiety: skillfully  her worth from work identity, considering looking at her next steps; taking Propranolol PRN for stage related anxiety  - infrequent skin picking     ADVERSE EFFECTS: none  MEDICAL CONCERNS: recovering from COVID, none today     APPETITE: OK, 171# in Nov 2023, eats vegan   SLEEP: with melatonin 10mg, sleeping restlessly 6-7 hours most nights     Substance Use:  Alcohol- limits, prefers craft beer  Caffeine- takes No Doze with Adderall for narcolepsy        Social/ Family History                                     FINANCIAL SUPPORT- 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 2015) and two daughters      LEGAL- None  EARLY HISTORY/ EDUCATION- Grew up in [place] with half siblings. BS in Biology from [employer]  SOCIAL/ SPIRITUAL SUPPORT- support from family, friends       CULTURAL INFLUENCES/ IMPACT- UNKNOWN       TRAUMA HISTORY- None  FEELS SAFE AT HOME- Yes  FAMILY HISTORY-  daughter Leticia- sertraline for anxiety    Medical / Surgical History                                 Patient Active Problem List   Diagnosis    Major depressive disorder, recurrent episode (H24)    Narcolepsy    Bladder spasms    Impulse control disorder    Hx of psychiatric care    Melasma    Rotator cuff tendinitis, left    Diffuse photodamage of skin       Past Surgical History:   Procedure Laterality Date    ECTOPIC PREGNANCY SURGERY      GYN SURGERY  ectopic pregnancy    HC TOOTH EXTRACTION W/FORCEP      PHOTOREFRACTIVE KERATECTOMY Bilateral     March 2021 - Dr. Walden      Medical Review of Systems             Pregnant/ breastfeeding- No    Contraception- IUD, her  had a vasectomy  A comprehensive review of systems was performed and is negative  other than noted in the HPI.    Denies head trauma, LOC.      While taking Wellbutrin, history of general tonic clonic seizure 9/20/2009 without aura with postictal confusion; second seizure while with SO on 1/23/10 (tonic clonic; foaming at the mouth with post ictal confusion and lethargy. Narcolepsy diagnosed about 2004. Remote history of febrile convulsion before 3yo. Her daughter has a history of febrile convulsions.     Allergy    Wellbutrin [bupropion] and Pcn [penicillins]     Wellbutrin- two seizures while taking Wellbutrin and Adderall    Current Medications        Current Outpatient Medications   Medication Sig Dispense Refill    amphetamine-dextroamphetamine (ADDERALL) 20 MG tablet Take two (2) tabs in the morning and one (1) tab at noon 90 tablet 0    azelaic acid (FINACIA) 15 % external gel Apply topically once daily 50 g 6    caffeine (NO-DOZE) 200 MG TABS Take 1 tablet (200 mg) by mouth 2 times daily (Patient taking differently: Take 200 mg by mouth 2 times daily 400 mg qam and 200 qnoon) 150 tablet     fluocin-hydroquinone-tretinoin 0.01-4-0.05 % CREA Apply twice daily to the face 30 g 3    melatonin 5 MG tablet Take 10 mg by mouth nightly as needed       Multiple Vitamins-Minerals (MULTIVITAMIN ADULT PO) Take by mouth daily       nitroFURantoin macrocrystal-monohydrate (MACROBID) 100 MG capsule Take 1 capsule (100 mg) by mouth once as needed (post-coital) 10 capsule 0    propranolol (INDERAL) 10 MG tablet Take 1-2 tablets (10-20 mg) by mouth daily as needed (performance anxiety) 30 tablet 3    amphetamine-dextroamphetamine (ADDERALL) 20 MG tablet Take two (2) tabs in the morning and one (1) tab at noon (Patient not taking: Reported on 11/22/2023) 90 tablet 0    amphetamine-dextroamphetamine (ADDERALL) 20 MG tablet Take two (2) tabs in the morning and one (1) tab at noon (Patient not taking: Reported on 11/22/2023) 90 tablet 0    Cyanocobalamin (VITAMIN B 12 PO)  (Patient not taking: Reported on  11/22/2023)       Vitals            There were no vitals taken for this visit.   Mental Status Exam             Alertness: alert  and oriented  Appearance:  N/A  Behavior/Demeanor: cooperative, pleasant and calm, with  N/A  eye contact   Speech: normal and regular rate and rhythm  Language: no problems  Psychomotor:  N/A  Mood: description consistent with euthymia  Affect: appropriate; was congruent to mood; was congruent to content  Thought Process/Associations: unremarkable  Thought Content:  Reports none;  Denies suicidal ideation, violent ideation, delusions, preoccupations, obsessions , phobia  and magical thinking  Perception:  Reports none;  Denies auditory hallucinations, visual hallucinations, visual distortion seen as shadows , depersonalization and derealization  Insight: fair  Judgment: good  Cognition: (6) does  appear grossly intact; formal cognitive testing was not done  Gait/Station and/or Muscle Strength/Tone:  N/A    Labs and Data                          Rating Scales:      Answers submitted by the patient for this visit:  Patient Health Questionnaire (Submitted on 11/22/2023)  If you checked off any problems, how difficult have these problems made it for you to do your work, take care of things at home, or get along with other people?: Not difficult at all  PHQ9 TOTAL SCORE: 2    PHQ9 Today:        2/22/2023    10:22 AM 5/24/2023     8:21 AM 11/22/2023     8:27 AM   PHQ   PHQ-9 Total Score 0 0 2   Q9: Thoughts of better off dead/self-harm past 2 weeks Not at all Not at all Not at all     Diagnosis      primary narcolepsy 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 (diagnosed by sleep study in 2007)  recurrent MDD in remission     Assessment           Today the following issues were addressed:      : 11/2023- Adderall 20mg #90 last filled on 11/02/2023     PSYCHOTROPIC DRUG INTERACTIONS:  - none with Adderall     Drug Interaction Management: Monitoring for adverse effects, routine vitals, using lowest therapeutic dose  of [psychotropics] and patient is aware of risks    Plan                                                                                                                       1) she chooses to continue off lamotrigine and continue Adderall 40mg QAM, 20mg Qnoon     - narcolepsy (2004) and seizures (2009/10 while taking Adderall + Wellbutrin) were confirmed by neurology  - takes No Doze 200mg BID for narcolepsy      RTC: 6 months, sooner as need    CRISIS NUMBERS:   Provided routinely in AVS.    Treatment Risk Statement:  The patient understands the risks, benefits, adverse effects and alternatives. Agrees to treatment with the capacity to do so. No medical contraindications to treatment. Agrees to call clinic for any problems. The patient understands to call 911 or go to the nearest ED if life threatening or urgent symptoms occur.     WHODAS 2.0  TODAY total score = N/A; [a 12-item WHODAS 2.0 assessment was not completed by the pt today and/or recorded in EPIC].    PROVIDER:  AYSE Dumont CNP

## 2023-11-22 NOTE — PATIENT INSTRUCTIONS
**For crisis resources, please see the information at the end of this document**   Patient Education    Thank you for coming to the [facility] [place] [facility].     Lab Testing:  If you had lab testing today and your results are reassuring or normal they will be mailed to you or sent through [facility] within 7 days. If the lab tests need quick action we will call you with the results. The phone number we will call with results is # [phone]. If this is not the best number please call our clinic and change the number.     Medication Refills:  If you need any refills please call your pharmacy and they will contact us. Our fax number for refills is [phone].   Three business days of notice are needed for general medication refill requests.   Five business days of notice are needed for controlled substance refill requests.   If you need to change to a different pharmacy, please contact the new pharmacy directly. The new pharmacy will help you get your medications transferred.     Contact Us:  Please call [phone] during business hours (8-5:00 M-F).   If you have medication related questions after clinic hours, or on the weekend, please call [phone].     Financial Assistance [phone]   Medical Records [phone]       MENTAL HEALTH CRISIS RESOURCES:  For a emergency help, please call 911 or go to the nearest Emergency Department.     Emergency Walk-In Options:   EmPATH Unit @ [facility] ([place]): [phone] - Specialized mental health emergency area designed to be calming  [facility] ([place]): [phone]  [facility] ([place]): [phone]  [facility]): [phone]    [facility] Crisis Information:   [place]: [phone]  [place]: [phone]  [place] ([place]) - Adult: [phone]     Child: [phone]  [place] - Adult: [phone]     Child: [phone]  [place]:  [phone]  List of all [facility] resources:   https://[place].gov/dhs/people-we-serve/adults/health-care/mental-health/resources/crisis-contacts.jsp    National Crisis Information:   Crisis Text Line: Text  [place]  to 298711  Suicide & Crisis Lifeline: 988  National Suicide Prevention Lifeline: 5-021-645-TALK (1-106.981.2528)       For online chat options, visit https://suicidepreventionlifeline.org/chat/  Poison Control Center: 2-673-597-2963  Trans Lifeline: 3-502-218-4037 - Hotline for transgender people of all ages  The Lb Project: 7-457-274-5411 - Hotline for LGBT youth     For Non-Emergency Support:   Fast Tracker: Mental Health & Substance Use Disorder Resources -   https://www.PetLoveckFullContactn.org/

## 2023-12-13 DIAGNOSIS — L81.1 MELASMA: ICD-10-CM

## 2023-12-19 NOTE — TELEPHONE ENCOUNTER
fluocin-hydroquinone-tretinoin 0.01-4-0.05 % CREA   30 g 3 10/13/2022     Last Office Visit : 4-6-2023  Future Office visit:  none

## 2023-12-28 ENCOUNTER — OFFICE VISIT (OUTPATIENT)
Dept: FAMILY MEDICINE | Facility: CLINIC | Age: 43
End: 2023-12-28
Payer: COMMERCIAL

## 2023-12-28 VITALS
WEIGHT: 171 LBS | OXYGEN SATURATION: 98 % | HEIGHT: 66 IN | TEMPERATURE: 97.6 F | DIASTOLIC BLOOD PRESSURE: 74 MMHG | BODY MASS INDEX: 27.48 KG/M2 | HEART RATE: 74 BPM | SYSTOLIC BLOOD PRESSURE: 112 MMHG | RESPIRATION RATE: 15 BRPM

## 2023-12-28 DIAGNOSIS — Z11.3 ROUTINE SCREENING FOR STI (SEXUALLY TRANSMITTED INFECTION): Primary | ICD-10-CM

## 2023-12-28 DIAGNOSIS — Z11.59 NEED FOR HEPATITIS C SCREENING TEST: ICD-10-CM

## 2023-12-28 DIAGNOSIS — R79.89 LOW VITAMIN D LEVEL: ICD-10-CM

## 2023-12-28 DIAGNOSIS — Z11.4 SCREENING FOR HIV (HUMAN IMMUNODEFICIENCY VIRUS): ICD-10-CM

## 2023-12-28 DIAGNOSIS — Z13.1 SCREENING FOR DIABETES MELLITUS: ICD-10-CM

## 2023-12-28 DIAGNOSIS — Z23 NEED FOR VACCINATION: ICD-10-CM

## 2023-12-28 LAB
HBA1C MFR BLD: 4.8 % (ref 0–5.6)
HBV SURFACE AG SERPL QL IA: NONREACTIVE
HCV AB SERPL QL IA: NONREACTIVE
HIV 1+2 AB+HIV1 P24 AG SERPL QL IA: NONREACTIVE
VIT D+METAB SERPL-MCNC: 17 NG/ML (ref 20–50)

## 2023-12-28 PROCEDURE — 87340 HEPATITIS B SURFACE AG IA: CPT | Mod: ORL | Performed by: FAMILY MEDICINE

## 2023-12-28 PROCEDURE — 87591 N.GONORRHOEAE DNA AMP PROB: CPT | Mod: ORL | Performed by: FAMILY MEDICINE

## 2023-12-28 PROCEDURE — 82306 VITAMIN D 25 HYDROXY: CPT | Mod: ORL | Performed by: FAMILY MEDICINE

## 2023-12-28 PROCEDURE — 86780 TREPONEMA PALLIDUM: CPT | Mod: ORL | Performed by: FAMILY MEDICINE

## 2023-12-28 PROCEDURE — 86803 HEPATITIS C AB TEST: CPT | Mod: ORL | Performed by: FAMILY MEDICINE

## 2023-12-28 PROCEDURE — 87389 HIV-1 AG W/HIV-1&-2 AB AG IA: CPT | Mod: ORL | Performed by: FAMILY MEDICINE

## 2023-12-28 PROCEDURE — 87491 CHLMYD TRACH DNA AMP PROBE: CPT | Mod: ORL | Performed by: FAMILY MEDICINE

## 2023-12-28 RX ORDER — FLUOCINOLONE ACETONIDE, HYDROQUINONE, AND TRETINOIN .1; 40; .5 MG/G; MG/G; MG/G
CREAM TOPICAL
Qty: 30 G | Refills: 1 | Status: SHIPPED | OUTPATIENT
Start: 2023-12-28 | End: 2024-02-22

## 2023-12-28 ASSESSMENT — ANXIETY QUESTIONNAIRES
GAD7 TOTAL SCORE: 0
1. FEELING NERVOUS, ANXIOUS, OR ON EDGE: NOT AT ALL
5. BEING SO RESTLESS THAT IT IS HARD TO SIT STILL: NOT AT ALL
GAD7 TOTAL SCORE: 0
IF YOU CHECKED OFF ANY PROBLEMS ON THIS QUESTIONNAIRE, HOW DIFFICULT HAVE THESE PROBLEMS MADE IT FOR YOU TO DO YOUR WORK, TAKE CARE OF THINGS AT HOME, OR GET ALONG WITH OTHER PEOPLE: NOT DIFFICULT AT ALL
3. WORRYING TOO MUCH ABOUT DIFFERENT THINGS: NOT AT ALL
7. FEELING AFRAID AS IF SOMETHING AWFUL MIGHT HAPPEN: NOT AT ALL
2. NOT BEING ABLE TO STOP OR CONTROL WORRYING: NOT AT ALL
6. BECOMING EASILY ANNOYED OR IRRITABLE: NOT AT ALL

## 2023-12-28 ASSESSMENT — PATIENT HEALTH QUESTIONNAIRE - PHQ9
5. POOR APPETITE OR OVEREATING: NOT AT ALL
SUM OF ALL RESPONSES TO PHQ QUESTIONS 1-9: 0

## 2023-12-28 NOTE — NURSING NOTE
Prior to immunization administration, verified patients identity using patient s name and date of birth. Please see Immunization Activity for additional information.     Screening Questionnaire for Adult Immunization    Are you sick today?   No   Do you have allergies to medications, food, a vaccine component or latex?   No   Have you ever had a serious reaction after receiving a vaccination?   No   Do you have a long-term health problem with heart, lung, kidney, or metabolic disease (e.g., diabetes), asthma, a blood disorder, no spleen, complement component deficiency, a cochlear implant, or a spinal fluid leak?  Are you on long-term aspirin therapy?   No   Do you have cancer, leukemia, HIV/AIDS, or any other immune system problem?   No   Do you have a parent, brother, or sister with an immune system problem?   No   In the past 3 months, have you taken medications that affect  your immune system, such as prednisone, other steroids, or anticancer drugs; drugs for the treatment of rheumatoid arthritis, Crohn s disease, or psoriasis; or have you had radiation treatments?   No   Have you had a seizure, or a brain or other nervous system problem?   No   During the past year, have you received a transfusion of blood or blood    products, or been given immune (gamma) globulin or antiviral drug?   No   For women: Are you pregnant or is there a chance you could become       pregnant during the next month?   No   Have you received any vaccinations in the past 4 weeks?   No     Immunization questionnaire answers were all negative.      Patient instructed to remain in clinic for 15 minutes afterwards, and to report any adverse reactions.     Screening performed by Amy Ansari MA on 12/28/2023 at 2:13 PM.

## 2023-12-28 NOTE — PROGRESS NOTES
"CC: Consult (Pt would like routine STI testing done. )      SUBJECTIVE: Josi is a 43 year old female who comes in with the following concerns:    Routine STI screening -- no symptoms (no vaginal discharge, dysuria, pelvic pain, abdominal pain, fevers, chills). No known STI exposure. Has Mirena IUD in place, occasionally spotting with the IUD.    Also discussed -- low vitamin D level (13) in 2013. Takes multivitamin inconsistently. Will recheck today.   Also due for routine DM screening since age >40. Will check A1c today.       OBJECTIVE:   /74 (BP Location: Left arm, Patient Position: Sitting, Cuff Size: Adult Regular)   Pulse 74   Temp 97.6  F (36.4  C) (Skin)   Resp 15   Ht 1.676 m (5' 5.98\")   Wt 77.6 kg (171 lb)   SpO2 98%   BMI 27.62 kg/m    General: Alert and oriented in no acute distress.  Psych: Mood and affect appropriate.      ASSESSMENT/PLAN:   Josi was seen today for consult.    Diagnoses and all orders for this visit:    Routine screening for STI (sexually transmitted infection)  -     NEISSERIA GONORRHOEA PCR  -     CHLAMYDIA TRACHOMATIS PCR  -     Hepatitis B surface antigen; Future  -     Treponema Abs w Reflex to RPR and Titer; Future    Low vitamin D level  -     Vitamin D deficiency screening; Future    Need for hepatitis C screening test  -     Hepatitis C Screen Reflex to HCV RNA Quant and Genotype; Future    Screening for HIV (human immunodeficiency virus)  -     HIV Antigen Antibody Combo Cascade; Future    Screening for diabetes mellitus  -     Hemoglobin A1c; Future    Need for vaccination  -     TDAP VACCINE (Adacel, Boostrix)  [[id number]]    Routine STI screening today.   Also updated other labs due -- vitamin D level d/t history of low vitamin D and routine DM screening.   Updated Tdap vaccine today.   [facility].   Worrisome signs and symptoms were discussed with Josi and she was instructed to return to the clinic for concerning symptoms or to call " with questions.      Jazmin Guy MD  Family Medicine

## 2023-12-29 PROBLEM — R79.89 LOW VITAMIN D LEVEL: Status: ACTIVE | Noted: 2023-12-29

## 2023-12-29 LAB
C TRACH DNA SPEC QL NAA+PROBE: NEGATIVE
N GONORRHOEA DNA SPEC QL NAA+PROBE: NEGATIVE
T PALLIDUM AB SER QL: NONREACTIVE

## 2024-01-21 DIAGNOSIS — L90.5 SCARRING: ICD-10-CM

## 2024-01-25 RX ORDER — AZELAIC ACID 0.15 G/G
GEL TOPICAL
Qty: 50 G | Refills: 2 | Status: SHIPPED | OUTPATIENT
Start: 2024-01-25

## 2024-01-25 NOTE — TELEPHONE ENCOUNTER
azelaic acid (FINACIA) 15 % external gel   50 g 6 6/6/2023     Last Office Visit : 4-6-2023  Future Office visit:  none      Process 1

## 2024-02-06 NOTE — TELEPHONE ENCOUNTER
[facility] Call Center    Phone Message    May a detailed message be left on voicemail: yes     Reason for Call: Medication Refill Request    Has the patient contacted the pharmacy for the refill? Yes   Name of medication being requested: amphetamine 20 mg   Provider who prescribed the medication: Kurt  Pharmacy:      Pt transitioning to [facility], a mail order rx service ([phone]) they've already got her lamotrigene there, need the amphetamine rx sent in.    Date medication is needed: asap      Action Taken: Message routed to:  Other: nursing pool    Travel Screening: Not Applicable                                                                         patient

## 2024-02-11 ENCOUNTER — HEALTH MAINTENANCE LETTER (OUTPATIENT)
Age: 44
End: 2024-02-11

## 2024-02-16 DIAGNOSIS — L81.1 MELASMA: ICD-10-CM

## 2024-02-22 RX ORDER — FLUOCINOLONE ACETONIDE, HYDROQUINONE, AND TRETINOIN .1; 40; .5 MG/G; MG/G; MG/G
CREAM TOPICAL
Qty: 30 G | Refills: 1 | Status: SHIPPED | OUTPATIENT
Start: 2024-02-22

## 2024-02-22 NOTE — TELEPHONE ENCOUNTER
Tri-Celia 0.01 %-4 %-0.05 % topical cream       Last Written Prescription Date:  12-28-23  Last Fill Quantity: 30g,   # refills: 1  Last Office Visit : 4-6-23  Dr. Nails                       10-13-22 Emanuel  Future Office visit:  none    Routing refill request to provider for review/approval because:  Med not on protocol  ? Ordering provider

## 2024-03-24 ENCOUNTER — MYC MEDICAL ADVICE (OUTPATIENT)
Dept: FAMILY MEDICINE | Facility: CLINIC | Age: 44
End: 2024-03-24

## 2024-03-25 DIAGNOSIS — G47.419 PRIMARY NARCOLEPSY WITHOUT CATAPLEXY: ICD-10-CM

## 2024-03-25 NOTE — TELEPHONE ENCOUNTER
I would reach out to the doctor who is prescribing Vyleesi and ask them for the prescription, and so that they are aware of the side effect as well.

## 2024-03-27 RX ORDER — DEXTROAMPHETAMINE SACCHARATE, AMPHETAMINE ASPARTATE, DEXTROAMPHETAMINE SULFATE AND AMPHETAMINE SULFATE 5; 5; 5; 5 MG/1; MG/1; MG/1; MG/1
TABLET ORAL
Qty: 90 TABLET | Refills: 0 | Status: SHIPPED | OUTPATIENT
Start: 2024-03-27 | End: 2024-05-13

## 2024-03-27 NOTE — TELEPHONE ENCOUNTER
Date of Last Office Visit: 11/22/2023  [facility]     Farzaneh Hicks APRN CNP     RTC: 6 mos  No shows: 0  Cancellations: 0  Date of Next Office Visit:   5/13/2024 Status: Mena    Arrive By: 7:45 AM     Appointment Time: 8:00 AM Length: 30   Visit Type: ADULT PSYCHIATRY RETURN [[id number]] IFRHA: [id number]   Provider: Farzaneh Hicks APRN CNP Department: [facility]     ------------------------------  amphetamine-dextroamphetamine (ADDERALL) 20 MG tablet 90 tablet 0 1/25/2024 - No  Sig: Take two (2) tabs in the morning and one (1) tab at noon  Sent to pharmacy as: Amphetamine-Dextroamphetamine 20 MG Oral Tablet (ADDERALL)  Class: E-Prescribe  ------------------------------     Last Visit Treatment Plan     1) she chooses to continue off lamotrigine and continue Adderall 40mg QAM, 20mg Qnoon      - narcolepsy (2004) and seizures (2009/10 while taking Adderall + Wellbutrin) were confirmed by neurology  - takes No Doze 200mg BID for narcolepsy      RTC: 6 months, sooner as need      Refill decision: Refill pended and routed to the provider for review/determination due to the following criteria not met: CONTROLLED MEDICATION      Medication unable to be refilled by RN due to criteria not met as indicated.                 []Eligibility - not seen in the last year              []Supervision - no future appointment              []Compliance - no shows, cancellations or lapse in therapy              []Verification - order discrepancy              [x]Controlled medication              []Medication not included in policy              []90-day supply request              []Other:

## 2024-04-02 ENCOUNTER — OFFICE VISIT (OUTPATIENT)
Dept: FAMILY MEDICINE | Facility: CLINIC | Age: 44
End: 2024-04-02
Attending: FAMILY MEDICINE
Payer: COMMERCIAL

## 2024-04-02 ENCOUNTER — LAB (OUTPATIENT)
Dept: LAB | Facility: CLINIC | Age: 44
End: 2024-04-02
Attending: FAMILY MEDICINE
Payer: COMMERCIAL

## 2024-04-02 VITALS
DIASTOLIC BLOOD PRESSURE: 73 MMHG | HEIGHT: 66 IN | SYSTOLIC BLOOD PRESSURE: 111 MMHG | WEIGHT: 171.1 LBS | HEART RATE: 79 BPM | BODY MASS INDEX: 27.5 KG/M2

## 2024-04-02 DIAGNOSIS — Z11.3 ROUTINE SCREENING FOR STI (SEXUALLY TRANSMITTED INFECTION): ICD-10-CM

## 2024-04-02 DIAGNOSIS — Z00.00 ROUTINE GENERAL MEDICAL EXAMINATION AT A HEALTH CARE FACILITY: Primary | ICD-10-CM

## 2024-04-02 DIAGNOSIS — Z11.4 SCREENING FOR HIV (HUMAN IMMUNODEFICIENCY VIRUS): ICD-10-CM

## 2024-04-02 DIAGNOSIS — Z78.9 HEPATITIS B VACCINATION STATUS UNKNOWN: ICD-10-CM

## 2024-04-02 DIAGNOSIS — N89.8 VAGINAL DRYNESS: ICD-10-CM

## 2024-04-02 LAB
HBV SURFACE AB SERPL IA-ACNC: 13.2 M[IU]/ML
HBV SURFACE AB SERPL IA-ACNC: REACTIVE M[IU]/ML
HBV SURFACE AG SERPL QL IA: NONREACTIVE
HIV 1+2 AB+HIV1 P24 AG SERPL QL IA: NONREACTIVE
T PALLIDUM AB SER QL: NONREACTIVE

## 2024-04-02 PROCEDURE — 99213 OFFICE O/P EST LOW 20 MIN: CPT | Mod: 25 | Performed by: FAMILY MEDICINE

## 2024-04-02 PROCEDURE — 86706 HEP B SURFACE ANTIBODY: CPT

## 2024-04-02 PROCEDURE — 36415 COLL VENOUS BLD VENIPUNCTURE: CPT

## 2024-04-02 PROCEDURE — 99213 OFFICE O/P EST LOW 20 MIN: CPT | Performed by: FAMILY MEDICINE

## 2024-04-02 PROCEDURE — 87340 HEPATITIS B SURFACE AG IA: CPT

## 2024-04-02 PROCEDURE — 87389 HIV-1 AG W/HIV-1&-2 AB AG IA: CPT

## 2024-04-02 PROCEDURE — 87491 CHLMYD TRACH DNA AMP PROBE: CPT | Performed by: FAMILY MEDICINE

## 2024-04-02 PROCEDURE — 86780 TREPONEMA PALLIDUM: CPT

## 2024-04-02 PROCEDURE — 87591 N.GONORRHOEAE DNA AMP PROB: CPT | Performed by: FAMILY MEDICINE

## 2024-04-02 PROCEDURE — 99396 PREV VISIT EST AGE 40-64: CPT | Performed by: FAMILY MEDICINE

## 2024-04-02 RX ORDER — ONDANSETRON 4 MG/1
4 TABLET, FILM COATED ORAL EVERY 8 HOURS PRN
COMMUNITY
Start: 2024-04-01

## 2024-04-02 RX ORDER — ESTRADIOL 0.1 MG/G
1 CREAM VAGINAL
Qty: 42.5 G | Refills: 3 | Status: SHIPPED | OUTPATIENT
Start: 2024-04-04

## 2024-04-02 RX ORDER — BREMELANOTIDE 1.75 MG/.3ML
INJECTION SUBCUTANEOUS
COMMUNITY
Start: 2024-03-23

## 2024-04-02 ASSESSMENT — PAIN SCALES - GENERAL: PAINLEVEL: NO PAIN (0)

## 2024-04-02 NOTE — PROGRESS NOTES
"CC: Annual Visit (STI testing, hormone levels)      Josi is a 44 year old female who presents to clinic for an annual exam.     New concerns:  Has noticed low libido. Has been taking Vyleesi. Has helped a little bit. Has had hot flashes for years (ever since her last pregnancy). Definitely for the last 1.5 years lack of interest in sex, holding on to her weight in the belly, waking up more at night. Some mild vaginal dryness. No dyspareunia.     Chronic health conditions:  Patient Active Problem List   Diagnosis    Major depressive disorder, recurrent episode (H24)    Narcolepsy    Bladder spasms    Impulse control disorder    Hx of psychiatric care    Melasma    Rotator cuff tendinitis, left    Diffuse photodamage of skin    Low vitamin D level       We reviewed and updated her medication list as necessary. Her past medical and surgical history as well as her family history were reviewed and updated as necessary.    Ob/gyn history:  LMP: No LMP recorded. (Menstrual status: IUD). Mirena IUD placed 2023. Amenorrhea with Mirena.   Sexually active: Yes with multiple partners  STIs: No concerns, uses condoms, but would like screening  Pap smears: 2023 NIL, HPV negative   History of abnormal paps: No  Breast cancer screening: scheduled for 2024    Other HM & health-related habits:  Tobacco: None    Alcohol: a few drinks/week  Drug use: no   Vaccines: will check hepatitis B immunity  Hep C & HIV screening: UTD  DM screenin2023 - A1c 4.8%  Lipids: recently had checked at [facility]  Colon cancer screening: at age 45  Bone health: vitamin D discussed -- has had low vitamin D, not taking any extra supplementation except what's in her vitamin, discussed adding in 1000 units daily      OBJECTIVE:   /73   Pulse 79   Ht 1.67 m (5' 5.75\")   Wt 77.6 kg (171 lb 1.6 oz)   BMI 27.83 kg/m     General: In NAD.  Cooperative and pleasant.  HEENT: Normocephalic, atraumatic. Oropharynx moist without lesions or " exudate. TMs normal. Supple neck.  Lungs: CTA bilaterally.  CV: RRR, normal S1 and S2, without murmurs, rubs, or gallops appreciated.    Abdomen: Soft, NT, ND.  No masses or hepatosplenomegaly appreciated.    Extremities: Warm and well perfused, without deformity, edema.  Skin: Clear without lesions or rashes.   Neuro: Grossly intact, nonfocal.  Psych: Mood and affect appropriate.      ASSESSMENT AND PLAN:   Josi was seen today for annual visit.    Routine general medical examination at a health care facility    Routine screening for STI (sexually transmitted infection)  Routine STI screening today.   -     HIV Antigen Antibody Combo Cascade; Future  -     Treponema Abs w Reflex to RPR and Titer; Future  -     Hepatitis B surface antigen; Future  -     NEISSERIA GONORRHOEA PCR  -     CHLAMYDIA TRACHOMATIS PCR    Hepatitis B vaccination status unknown  Only has 2 documented doses of hep B vaccine - will check antibody.   -     Hepatitis B Surface Antibody; Future    Vaginal dryness  Discussed not routinely checking hormone levels for perimenopausal sx due to fluctuations day to day and woman to woman. For vaginal dryness, will trial Estrace cream twice weekly. Consider MHT with estradiol patch if perimenopause symptoms worsening, she has Mirena IUD in place for uterine lining protection.   -     estradiol (ESTRACE) 0.1 MG/GM vaginal cream; Place 1 g vaginally twice a week      Return in about 53 weeks (around 4/8/2025) for Annual Wellness Visit.    Jazmin Guy MD  Family Medicine

## 2024-04-02 NOTE — PATIENT INSTRUCTIONS
Thank you for trusting us with your care!     If you need to contact us for questions about:  Symptoms, Scheduling & Medical Questions; Non-urgent (2-3 day response) [employer] message, Urgent (needing response today) [phone] (if after 3:30pm next day response)   Prescriptions: Please call your Pharmacy   Billing: Jayson [phone] or [facility]:[phone]    Add in an extra 1000 units of vitamin D daily   Preventive Care Advice   This is general advice given by our system to help you stay healthy. However, your care team may have specific advice just for you. Please talk to your care team about your preventive care needs.  Nutrition  Eat 5 or more servings of fruits and vegetables each day.  Try wheat bread, brown rice and whole grain pasta (instead of white bread, rice, and pasta).  Get enough calcium and vitamin D. Check the label on foods and aim for 100% of the RDA (recommended daily allowance).  Lifestyle  Exercise at least 150 minutes each week   (30 minutes a day, 5 days a week).  Do muscle strengthening activities 2 days a week. These help control your weight and prevent disease.  No smoking.  Wear sunscreen to prevent skin cancer.  Have a dental exam and cleaning every 6 months.  Yearly exams  See your health care team every year to talk about:  Any changes in your health.  Any medicines your care team has prescribed.  Preventive care, family planning, and ways to prevent chronic diseases.  Shots (vaccines)   HPV shots (up to age 26), if you've never had them before.  Hepatitis B shots (up to age 59), if you've never had them before.  COVID-19 shot: Get this shot when it's due.  Flu shot: Get a flu shot every year.  Tetanus shot: Get a tetanus shot every 10 years.  Pneumococcal, hepatitis A, and RSV shots: Ask your care team if you need these based on your risk.  Shingles shot (for age 50 and up).  General health tests  Diabetes screening:  Starting at age 35, Get screened for diabetes at least  every 3 years.  If you are younger than age 35, ask your care team if you should be screened for diabetes.  Cholesterol test: At age 39, start having a cholesterol test every 5 years, or more often if advised.  Bone density scan (DEXA): At age 50, ask your care team if you should have this scan for osteoporosis (brittle bones).  Hepatitis C: Get tested at least once in your life.  STIs (sexually transmitted infections)  Before age 24: Ask your care team if you should be screened for STIs.  After age 24: Get screened for STIs if you're at risk. You are at risk for STIs (including HIV) if:  You are sexually active with more than one person.  You don't use condoms every time.  You or a partner was diagnosed with a sexually transmitted infection.  If you are at risk for HIV, ask about PrEP medicine to prevent HIV.  Get tested for HIV at least once in your life, whether you are at risk for HIV or not.  Cancer screening tests  Cervical cancer screening: If you have a cervix, begin getting regular cervical cancer screening tests at age 21. Most people who have regular screenings with normal results can stop after age 65. Talk about this with your provider.  Breast cancer scan (mammogram): If you've ever had breasts, begin having regular mammograms starting at age 40. This is a scan to check for breast cancer.  Colon cancer screening: It is important to start screening for colon cancer at age 45.  Have a colonoscopy test every 10 years (or more often if you're at risk) Or, ask your provider about stool tests like a FIT test every year or Cologuard test every 3 years.  To learn more about your testing options, visit: https://[email or web address].pdf.  For help making a decision, visit: https://bit.ly/qd04422.  Prostate cancer screening test: If you have a prostate and are age 55 to 69, ask your provider if you would benefit from a yearly prostate cancer screening test.  Lung cancer screening: If you are a current or former  smoker age 50 to 80, ask your care team if ongoing lung cancer screenings are right for you.  For informational purposes only. Not to replace the advice of your health care provider. Copyright   2023 [facility] [employer]. All rights reserved. Clinically reviewed by the [facility] Transitions Program. [employer] 825868 - REV 01/24.

## 2024-04-03 LAB
C TRACH DNA SPEC QL NAA+PROBE: NEGATIVE
N GONORRHOEA DNA SPEC QL NAA+PROBE: NEGATIVE

## 2024-04-22 ENCOUNTER — MYC REFILL (OUTPATIENT)
Dept: FAMILY MEDICINE | Facility: CLINIC | Age: 44
End: 2024-04-22

## 2024-04-22 DIAGNOSIS — Z29.89 NEED FOR PROPHYLAXIS AGAINST URINARY TRACT INFECTION: ICD-10-CM

## 2024-04-25 RX ORDER — NITROFURANTOIN 25; 75 MG/1; MG/1
100 CAPSULE ORAL
Qty: 10 CAPSULE | Refills: 0 | Status: SHIPPED | OUTPATIENT
Start: 2024-04-25 | End: 2024-07-09

## 2024-05-13 ENCOUNTER — VIRTUAL VISIT (OUTPATIENT)
Dept: PSYCHIATRY | Facility: CLINIC | Age: 44
End: 2024-05-13
Attending: NURSE PRACTITIONER
Payer: COMMERCIAL

## 2024-05-13 DIAGNOSIS — G47.419 PRIMARY NARCOLEPSY WITHOUT CATAPLEXY: ICD-10-CM

## 2024-05-13 PROCEDURE — 99214 OFFICE O/P EST MOD 30 MIN: CPT | Mod: 95 | Performed by: NURSE PRACTITIONER

## 2024-05-13 RX ORDER — DEXTROAMPHETAMINE SACCHARATE, AMPHETAMINE ASPARTATE, DEXTROAMPHETAMINE SULFATE AND AMPHETAMINE SULFATE 5; 5; 5; 5 MG/1; MG/1; MG/1; MG/1
TABLET ORAL
Qty: 90 TABLET | Refills: 0 | Status: SHIPPED | OUTPATIENT
Start: 2024-07-08

## 2024-05-13 RX ORDER — DEXTROAMPHETAMINE SACCHARATE, AMPHETAMINE ASPARTATE, DEXTROAMPHETAMINE SULFATE AND AMPHETAMINE SULFATE 5; 5; 5; 5 MG/1; MG/1; MG/1; MG/1
TABLET ORAL
Qty: 90 TABLET | Refills: 0 | Status: SHIPPED | OUTPATIENT
Start: 2024-05-13

## 2024-05-13 RX ORDER — DEXTROAMPHETAMINE SACCHARATE, AMPHETAMINE ASPARTATE, DEXTROAMPHETAMINE SULFATE AND AMPHETAMINE SULFATE 5; 5; 5; 5 MG/1; MG/1; MG/1; MG/1
TABLET ORAL
Qty: 90 TABLET | Refills: 0 | Status: SHIPPED | OUTPATIENT
Start: 2024-06-10 | End: 2024-08-06

## 2024-05-13 ASSESSMENT — PATIENT HEALTH QUESTIONNAIRE - PHQ9
SUM OF ALL RESPONSES TO PHQ QUESTIONS 1-9: 8
10. IF YOU CHECKED OFF ANY PROBLEMS, HOW DIFFICULT HAVE THESE PROBLEMS MADE IT FOR YOU TO DO YOUR WORK, TAKE CARE OF THINGS AT HOME, OR GET ALONG WITH OTHER PEOPLE: SOMEWHAT DIFFICULT
SUM OF ALL RESPONSES TO PHQ QUESTIONS 1-9: 8

## 2024-05-13 ASSESSMENT — PAIN SCALES - GENERAL: PAINLEVEL: NO PAIN (0)

## 2024-05-13 NOTE — NURSING NOTE
Is the patient currently in the state of [place]? YES    Visit mode:VIDEO    If the visit is dropped, the patient can be reconnected by: VIDEO VISIT: Send to e-mail at: [email or web address]    Will anyone else be joining the visit? NO  (If patient encounters technical issues they should call [phone])    How would you like to obtain your AVS? MyChart    Are changes needed to the allergy or medication list? No    Are refills needed on medications prescribed by this physician? YES    Reason for visit: RECHECK    Patient prefers to complete questionnaires in MyChart rather than over the phone. Pt stated they will complete them prior to joining the video.    Rosa GILBERT

## 2024-05-13 NOTE — PATIENT INSTRUCTIONS
**For crisis resources, please see the information at the end of this document**   Patient Education    Thank you for coming to the [facility] [place] [facility].     Lab Testing:  If you had lab testing today and your results are reassuring or normal they will be mailed to you or sent through [employer] within 7 days. If the lab tests need quick action we will call you with the results. The phone number we will call with results is # [phone]. If this is not the best number please call our clinic and change the number.     Medication Refills:  If you need any refills please call your pharmacy and they will contact us. Our fax number for refills is [phone].   Three business days of notice are needed for general medication refill requests.   Five business days of notice are needed for controlled substance refill requests.   If you need to change to a different pharmacy, please contact the new pharmacy directly. The new pharmacy will help you get your medications transferred.     Contact Us:  Please call [phone] during business hours (8-5:00 M-F).   If you have medication related questions after clinic hours, or on the weekend, please call [phone].     Financial Assistance [phone]   Medical Records [phone]       MENTAL HEALTH CRISIS RESOURCES:  For a emergency help, please call 911 or go to the nearest Emergency Department.     Emergency Walk-In Options:   EmPATH Unit @ [facility] ([place]): [phone] - Specialized mental health emergency area designed to be calming  [facility] ([place]): [phone]  [facility] ([place]): [phone]  [facility]): [phone]    [facility] Crisis Information:   [place]: [phone]  [place]: [phone]  [place] ([place]) - Adult: [phone]     Child: [phone]  [place] - Adult: [phone]     Child: [phone]  [place]:  [phone]  List of all [facility] resources:   https://[place].gov/dhs/people-we-serve/adults/health-care/mental-health/resources/crisis-contacts.jsp    National Crisis Information:   Crisis Text Line: Text  [place]  to 722534  Suicide & Crisis Lifeline: 988  National Suicide Prevention Lifeline: 3-378-301-TALK (1-447.864.8400)       For online chat options, visit https://suicidepreventionlifeline.org/chat/  Poison Control Center: 5-174-571-9458  Trans Lifeline: 5-258-789-0776 - Hotline for transgender people of all ages  The Lb Project: 2-869-096-6890 - Hotline for LGBT youth     For Non-Emergency Support:   Fast Tracker: Mental Health & Substance Use Disorder Resources -   https://www.[employer]Realien.org/

## 2024-05-13 NOTE — PROGRESS NOTES
"Virtual Visit Details    Type of service:  Video Visit     Originating Location (pt. Location): Home  Distant Location (provider location):  Off-site  Platform used for Video Visit: [facility]iatry [facility]    PSYCHIATRIC PROGRESS NOTE         Lashonda Rodriguez is a 44 year old female who prefers the name Josi and pronouns she, her.  Therapist: couples counseling as needed  PCP: Selma Alba  Other Providers: None     PREVIOUS PSYCH MED TRIALS:  - Provigil (limited effect, 2-3 month trial)  - Nuvigil 250mg (trialed in 2013-ineffective)  - buspar 30mg (unknown)  - Wellbutrin XL (taken with Adderall, two seizures in 2009/2010)  - AMT 10mg for skin picking (unknown)  - NAC (ineffective, unsure about her compliance)     Pertinent Background:  See previous notes.  Psych critical item history includes [no critical items].      Interim History                                                                                                       The patient is a good historian, reports good treatment adherence.    Last seen 11/22/2023 when she chose to continue Adderall 40mg QAM, 20mg Qnoon     Since the last visit, she's been \"OK, a bit depressed\".   - endorses mood stability, she's been off lamotrigine since Mar 2023, this was initially prescribed for seizures (while taking Wellbutrin and Adderall) and skin picking  - reduces Adderall to half dose at Noon dose as needed to protect sleep, takes melatonin PRN  - she started grad school in Jan, she's attending [employer] in Operations and Supply Chain Management online, one class this summer  - she took an organizational leadership class that helped her identify what she's missing at work, she misses the client/ clinic contact, the end to end process, the meaning associated with her work  - her company laid off 1/3 of their workers in Dec  - 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  - she recognizes she ties some of her " self worth to work identity  - she's living 5 min from work in [employer] in [place]  - 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[employer], 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  - her daughters are both in school, Leticia is in an LPN program, Desiree is in animation program at [employer]  - 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    Recent Symptoms:   Depression: PHQ 8, feeling stable, endorses few days of anhedonia, interrupted sleep; several days of dysphoria, low energy, feelings of failure  - she does pretty well if she's distracted, disappointed last week to hear her boss is traveling for business to [place] than she    Anxiety: considering looking at her next steps for work and life, situationally anxious; taking Propranolol PRN for anxiety during gigs  - infrequent skin picking     ADVERSE EFFECTS: none  MEDICAL CONCERNS: none today, wonders if she experiences perimenopause, she's had hot flashes since her first pregnancy     APPETITE: OK, 171# in Nov 2023, eats vegan   SLEEP: with melatonin 10mg, sleeping restlessly 6-7 hours      Substance Use:  Alcohol- limits, prefers craft beer  Caffeine- takes No Doze with Adderall for narcolepsy        Social/ Family History                                     FINANCIAL SUPPORT- working as an Operations  at [employer] since 2013  CHILDREN- two daughters (Desiree b. 2003, Leticia vicente 2002)       LIVING SITUATION- lives with  Tono (m. 2015) and two daughters      LEGAL- None  EARLY HISTORY/ EDUCATION- Grew up in [place] with half siblings. BS in Biology from [employer]  SOCIAL/ SPIRITUAL SUPPORT- support from family, friends       CULTURAL INFLUENCES/ IMPACT- UNKNOWN       TRAUMA HISTORY- None  FEELS SAFE AT HOME- Yes  FAMILY HISTORY-  daughter Leticia- sertraline for anxiety    Medical / Surgical History                                 Patient Active Problem List   Diagnosis    Major depressive disorder, recurrent episode (H24)    Narcolepsy     Bladder spasms    Impulse control disorder    Hx of psychiatric care    Melasma    Rotator cuff tendinitis, left    Diffuse photodamage of skin    Low vitamin D level       Past Surgical History:   Procedure Laterality Date    ECTOPIC PREGNANCY SURGERY      GYN SURGERY  ectopic pregnancy    HC TOOTH EXTRACTION W/FORCEP      PHOTOREFRACTIVE KERATECTOMY Bilateral     March 2021 - Dr. Walden      Medical Review of Systems             Pregnant/ breastfeeding- No    Contraception- IUD, her  had a vasectomy  A comprehensive review of systems was performed and is negative other than noted in the HPI.    Denies head trauma, LOC.      While taking Wellbutrin, history of general tonic clonic seizure 9/20/2009 without aura with postictal confusion; second seizure while with SO on 1/23/10 (tonic clonic; foaming at the mouth with post ictal confusion and lethargy. Narcolepsy diagnosed about 2004. Remote history of febrile convulsion before 3yo. Her daughter has a history of febrile convulsions.     Allergy    Wellbutrin [bupropion] and Pcn [penicillins]     Wellbutrin- two seizures while taking Wellbutrin and Adderall    Current Medications        Current Outpatient Medications   Medication Sig Dispense Refill    amphetamine-dextroamphetamine (ADDERALL) 20 MG tablet TAKE TWO TABLETS BY MOUTH EVERY MORNING AND ONE TABLET AT NOON 90 tablet 0    azelaic acid (FINACIA) 15 % external gel Apply topically once daily 50 g 2    caffeine (NO-DOZE) 200 MG TABS Take 1 tablet (200 mg) by mouth 2 times daily (Patient taking differently: Take 200 mg by mouth 2 times daily 400 mg qam and 200 qnoon) 150 tablet     estradiol (ESTRACE) 0.1 MG/GM vaginal cream Place 1 g vaginally twice a week 42.5 g 3    fluocin-hydroquinone-tretinoin (TRI-ALLEN) 0.01-4-0.05 % CREA Apply twice daily to the face. For additional refills, please schedule a follow-up appointment. 30 g 1    melatonin 5 MG tablet Take 10 mg by mouth nightly as needed        Multiple Vitamins-Minerals (MULTIVITAMIN ADULT PO) Take by mouth daily       nitroFURantoin macrocrystal-monohydrate (MACROBID) 100 MG capsule Take 1 capsule (100 mg) by mouth once as needed (post-coital) 10 capsule 0    ondansetron (ZOFRAN) 4 MG tablet Take 4 mg by mouth every 8 hours as needed for nausea      propranolol (INDERAL) 10 MG tablet Take 1-2 tablets (10-20 mg) by mouth daily as needed (performance anxiety) 30 tablet 3    VYLEESI 1.75 MG/0.3ML SOAJ        Vitals            There were no vitals taken for this visit.     Pulse Readings from Last 5 Encounters:   04/02/24 79   12/28/23 74   11/02/23 86   07/26/23 69   07/20/23 76     Wt Readings from Last 5 Encounters:   04/02/24 77.6 kg (171 lb 1.6 oz)   12/28/23 77.6 kg (171 lb)   11/02/23 77.6 kg (171 lb)   07/26/23 78.5 kg (173 lb 1.3 oz)   07/20/23 77.6 kg (171 lb 0.6 oz)     BP Readings from Last 5 Encounters:   04/02/24 111/73   12/28/23 112/74   11/02/23 122/75   07/26/23 115/73   07/20/23 113/73     Mental Status Exam             Alertness: alert  and oriented  Appearance: neatly groomed and dressed  Behavior/Demeanor: cooperative, pleasant and calm, with good eye contact   Speech: normal and regular rate and rhythm  Language: no problems  Psychomotor: seated still  Mood: description consistent with euthymia  Affect: appropriate; was congruent to mood; was congruent to content  Thought Process/Associations: unremarkable  Thought Content:  Reports none;  Denies suicidal ideation, violent ideation, delusions, preoccupations, obsessions , phobia  and magical thinking  Perception:  Reports none;  Denies auditory hallucinations, visual hallucinations, visual distortion seen as shadows , depersonalization and derealization  Insight: fair  Judgment: good  Cognition: (6) does  appear grossly intact; formal cognitive testing was not done  Gait/Station and/or Muscle Strength/Tone:  N/A    Labs and Data                          Rating Scales:      Answers  submitted by the patient for this visit:  Patient Health Questionnaire (Submitted on 5/13/2024)  If you checked off any problems, how difficult have these problems made it for you to do your work, take care of things at home, or get along with other people?: Somewhat difficult  PHQ9 TOTAL SCORE: 8    PHQ9 Today:        11/22/2023     8:27 AM 12/28/2023     2:00 PM 5/13/2024     7:54 AM   PHQ   PHQ-9 Total Score 2 0 8   Q9: Thoughts of better off dead/self-harm past 2 weeks Not at all Not at all Not at all     Diagnosis      primary narcolepsy 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 (diagnosed by sleep study in 2007)  recurrent MDD in remission     Assessment           Today the following issues were addressed:      : 5/2024- Adderall 20mg #90 last filled on 3/30/2024     PSYCHOTROPIC DRUG INTERACTIONS:  - none with Adderall     Drug Interaction Management: Monitoring for adverse effects, routine vitals, using lowest therapeutic dose of [psychotropics] and patient is aware of risks    Plan                                                                                                                       1) she chooses to continue Adderall 40mg QAM, 20mg Qnoon     - narcolepsy (2004) and seizures (2009/10 while taking Adderall + Wellbutrin) were confirmed by neurology  - takes No Doze 200mg BID for narcolepsy    2[facility] for individual [facility] options      RTC: 6 months, sooner as need    CRISIS NUMBERS:   Provided routinely in AVS.    Treatment Risk Statement:  The patient understands the risks, benefits, adverse effects and alternatives. Agrees to treatment with the capacity to do so. No medical contraindications to treatment. Agrees to call clinic for any problems. The patient understands to call 911 or go to the nearest ED if life threatening or urgent symptoms occur.     WHODAS 2.0  TODAY total score = N/A; [a 12-item WHODAS 2.0 assessment was not completed by the pt today and/or recorded in  EPIC].    PROVIDER:  AYSE Dumont CNP

## 2024-07-04 DIAGNOSIS — L81.1 MELASMA: ICD-10-CM

## 2024-07-09 ENCOUNTER — MYC MEDICAL ADVICE (OUTPATIENT)
Dept: PSYCHIATRY | Facility: CLINIC | Age: 44
End: 2024-07-09
Payer: COMMERCIAL

## 2024-07-09 ENCOUNTER — MYC REFILL (OUTPATIENT)
Dept: FAMILY MEDICINE | Facility: CLINIC | Age: 44
End: 2024-07-09

## 2024-07-09 DIAGNOSIS — Z29.89 NEED FOR PROPHYLAXIS AGAINST URINARY TRACT INFECTION: ICD-10-CM

## 2024-07-10 RX ORDER — FLUOCINOLONE ACETONIDE, HYDROQUINONE, AND TRETINOIN .1; 40; .5 MG/G; MG/G; MG/G
CREAM TOPICAL
Qty: 30 G | Refills: 1 | OUTPATIENT
Start: 2024-07-10

## 2024-07-10 RX ORDER — NITROFURANTOIN 25; 75 MG/1; MG/1
100 CAPSULE ORAL
Qty: 10 CAPSULE | Refills: 2 | Status: SHIPPED | OUTPATIENT
Start: 2024-07-10

## 2024-07-10 NOTE — TELEPHONE ENCOUNTER
PA approved per Epic. Writer spoke with C[facility] Pharmacist, who confirmed that prescription was processed successfully after PA approval.   Patient updated via [employer] message.

## 2024-07-10 NOTE — TELEPHONE ENCOUNTER
Medication requested: nitroFURantoin macrocrystal-monohydrate (MACROBID) 100 MG capsule   Last office visit: 4/2/24  [facility] appointments: none  Medication last refilled: 4/25/24; 10 + 0 refills  Last qualifying labs: N/A    Routing refill request to provider for review/approval because:  Drug not on the [facility] refill protocol     Noman MONTGOMERY, RN  07/10/24 12:16 PM

## 2024-07-10 NOTE — TELEPHONE ENCOUNTER
Patient stable on dose of Adderall since at least  while under care of Farzaneh Hicks. Previous PA from  has .     PA initiated via Epic.

## 2024-07-10 NOTE — TELEPHONE ENCOUNTER
Tri-Celia 0.01 %-4 %-0.05 % topical cream    Last Written Prescription Date:  2/22/24  Last Fill Quantity: 30 g ,   # refills: 1  Last Office Visit : 4/6/23 [facility] Office visit:  3 months  Outside of time frame for refill.Denied per protocol.

## 2024-08-06 ENCOUNTER — MYC REFILL (OUTPATIENT)
Dept: PSYCHIATRY | Facility: CLINIC | Age: 44
End: 2024-08-06
Payer: COMMERCIAL

## 2024-08-06 DIAGNOSIS — G47.419 PRIMARY NARCOLEPSY WITHOUT CATAPLEXY: ICD-10-CM

## 2024-08-06 NOTE — TELEPHONE ENCOUNTER
Last seen: 5/13/2024  RTC: 6 months  Cancel: None  No-show: None  Next appt: 11/11/2024       Medication requested: amphetamine-dextroamphetamine (ADDERALL) 20 MG tablet     From chart note:   1) she chooses to continue Adderall 40mg QAM, 20mg Qnoon      - narcolepsy (2004) and seizures (2009/10 while taking Adderall + Wellbutrin) were confirmed by neurology  - takes No Doze 200mg BID for narcolepsy      Refills sent to RN for final review

## 2024-08-06 NOTE — TELEPHONE ENCOUNTER
Per  Adderall last refilled on: 7/10 #90, 6/11 #90, 5/13 #90    Will route to provider for approval

## 2024-08-07 RX ORDER — DEXTROAMPHETAMINE SACCHARATE, AMPHETAMINE ASPARTATE, DEXTROAMPHETAMINE SULFATE AND AMPHETAMINE SULFATE 5; 5; 5; 5 MG/1; MG/1; MG/1; MG/1
TABLET ORAL
Qty: 90 TABLET | Refills: 0 | Status: SHIPPED | OUTPATIENT
Start: 2024-08-07 | End: 2024-09-05

## 2024-09-05 ENCOUNTER — MYC REFILL (OUTPATIENT)
Dept: PSYCHIATRY | Facility: CLINIC | Age: 44
End: 2024-09-05
Payer: COMMERCIAL

## 2024-09-05 DIAGNOSIS — G47.419 PRIMARY NARCOLEPSY WITHOUT CATAPLEXY: ICD-10-CM

## 2024-09-05 RX ORDER — DEXTROAMPHETAMINE SACCHARATE, AMPHETAMINE ASPARTATE, DEXTROAMPHETAMINE SULFATE AND AMPHETAMINE SULFATE 5; 5; 5; 5 MG/1; MG/1; MG/1; MG/1
TABLET ORAL
Qty: 90 TABLET | Refills: 0 | Status: SHIPPED | OUTPATIENT
Start: 2024-09-07 | End: 2024-10-06

## 2024-09-05 NOTE — TELEPHONE ENCOUNTER
Last seen: 05/13/2024  RTC: 6 months   Cancel: 0  No-show: 0  Next appt: 11/11/2024       Medication requested:   Pending Prescriptions:                       Disp   Refills    amphetamine-dextroamphetamine (ADDERALL) *90 tab*0            Sig: TAKE TWO TABLETS BY MOUTH EVERY MORNING AND ONE           TABLET AT NOON    From chart note:   she chooses to continue Adderall 40mg QAM, 20mg Qnoon      Last refill per       Medication unable to be refilled by RN due to criteria not met as indicated.                 []Eligibility - not seen in the last year              []Supervision - no future appointment              []Compliance - no shows, cancellations or lapse in therapy              []Verification - order discrepancy              [x]Controlled medication              []Medication not included in policy              []90-day supply request              []Other:

## 2024-10-06 ENCOUNTER — MYC REFILL (OUTPATIENT)
Dept: PSYCHIATRY | Facility: CLINIC | Age: 44
End: 2024-10-06
Payer: COMMERCIAL

## 2024-10-06 DIAGNOSIS — G47.419 PRIMARY NARCOLEPSY WITHOUT CATAPLEXY: ICD-10-CM

## 2024-10-07 RX ORDER — DEXTROAMPHETAMINE SACCHARATE, AMPHETAMINE ASPARTATE, DEXTROAMPHETAMINE SULFATE AND AMPHETAMINE SULFATE 5; 5; 5; 5 MG/1; MG/1; MG/1; MG/1
TABLET ORAL
Qty: 90 TABLET | Refills: 0 | Status: SHIPPED | OUTPATIENT
Start: 2024-10-07 | End: 2024-11-11

## 2024-10-07 NOTE — TELEPHONE ENCOUNTER
Last seen: 05/13/2024  RTC: 6 months  Cancel: 0  No-show: 0  Next appt: 11/11/2024       Medication requested:   Pending Prescriptions:                       Disp   Refills    amphetamine-dextroamphetamine (ADDERALL) *90 tab*0            Sig: TAKE TWO TABLETS BY MOUTH EVERY MORNING AND ONE           TABLET AT NOON    Last refill per       From chart note:   continue Adderall 40mg      Medication unable to be refilled by RN due to criteria not met as indicated.                 []Eligibility - not seen in the last year              []Supervision - no future appointment              []Compliance - no shows, cancellations or lapse in therapy              []Verification - order discrepancy              [x]Controlled medication              []Medication not included in policy              []90-day supply request              []Other:      
General: Well developed; well nourished; in no acute distress    Eyes: PERRL (A), EOM intact; conjunctiva and sclera clear  Head: Normocephalic; atraumatic  ENMT: External ear normal, left TM nonvisualized due to cerumen impaction, right TM is WNL,  nasal mucosa normal, no nasal discharge; airway clear, oropharynx slightly erythematous.   Neck: Supple; non tender; No cervical adenopathy  Respiratory: No chest wall deformity, good air entry B/l, no wheezing appreciated. subcostal and suprasternal retractions noted. Tachypnea.   Cardiovascular: Tachycardic. S1 and S2 Normal; No murmurs, gallops or rubs  Abdominal: Soft non-tender non-distended; normal bowel sounds; no hepatosplenomegaly; no masses  Extremities: Full range of motion, no tenderness, no cyanosis or edema  Vascular: Upper and lower peripheral pulses palpable 2+ bilaterally  Neurological: grossly intact  Skin: Warm and dry. No acute rash, no subcutaneous nodules  Lymph Nodes: No  adenopathy  Musculoskeletal: Normal gait, tone, without deformities

## 2024-11-11 ENCOUNTER — VIRTUAL VISIT (OUTPATIENT)
Dept: PSYCHIATRY | Facility: CLINIC | Age: 44
End: 2024-11-11
Attending: NURSE PRACTITIONER
Payer: COMMERCIAL

## 2024-11-11 DIAGNOSIS — G47.419 PRIMARY NARCOLEPSY WITHOUT CATAPLEXY: ICD-10-CM

## 2024-11-11 RX ORDER — DEXTROAMPHETAMINE SACCHARATE, AMPHETAMINE ASPARTATE, DEXTROAMPHETAMINE SULFATE AND AMPHETAMINE SULFATE 5; 5; 5; 5 MG/1; MG/1; MG/1; MG/1
TABLET ORAL
Qty: 90 TABLET | Refills: 0 | Status: SHIPPED | OUTPATIENT
Start: 2025-01-06

## 2024-11-11 RX ORDER — DEXTROAMPHETAMINE SACCHARATE, AMPHETAMINE ASPARTATE, DEXTROAMPHETAMINE SULFATE AND AMPHETAMINE SULFATE 5; 5; 5; 5 MG/1; MG/1; MG/1; MG/1
TABLET ORAL
Qty: 90 TABLET | Refills: 0 | Status: SHIPPED | OUTPATIENT
Start: 2024-11-11

## 2024-11-11 RX ORDER — DEXTROAMPHETAMINE SACCHARATE, AMPHETAMINE ASPARTATE, DEXTROAMPHETAMINE SULFATE AND AMPHETAMINE SULFATE 5; 5; 5; 5 MG/1; MG/1; MG/1; MG/1
TABLET ORAL
Qty: 90 TABLET | Refills: 0 | Status: SHIPPED | OUTPATIENT
Start: 2024-12-09

## 2024-11-11 ASSESSMENT — PAIN SCALES - GENERAL: PAINLEVEL_OUTOF10: NO PAIN (0)

## 2024-11-11 ASSESSMENT — PATIENT HEALTH QUESTIONNAIRE - PHQ9
10. IF YOU CHECKED OFF ANY PROBLEMS, HOW DIFFICULT HAVE THESE PROBLEMS MADE IT FOR YOU TO DO YOUR WORK, TAKE CARE OF THINGS AT HOME, OR GET ALONG WITH OTHER PEOPLE: NOT DIFFICULT AT ALL
SUM OF ALL RESPONSES TO PHQ QUESTIONS 1-9: 1
SUM OF ALL RESPONSES TO PHQ QUESTIONS 1-9: 1

## 2024-11-11 NOTE — PATIENT INSTRUCTIONS
**For crisis resources, please see the information at the end of this document**   Patient Education    Thank you for coming to the [facility] [place] [facility].     Lab Testing:  If you had lab testing today and your results are reassuring or normal they will be mailed to you or sent through PayLease within 7 days. If the lab tests need quick action we will call you with the results. The phone number we will call with results is # [phone]. If this is not the best number please call our clinic and change the number.     Medication Refills:  If you need any refills please call your pharmacy and they will contact us. Our fax number for refills is [phone].   Three business days of notice are needed for general medication refill requests.   Five business days of notice are needed for controlled substance refill requests.   If you need to change to a different pharmacy, please contact the new pharmacy directly. The new pharmacy will help you get your medications transferred.     Contact Us:  Please call [phone] during business hours (8-5:00 M-F).   If you have medication related questions after clinic hours, or on the weekend, please call [phone].     Financial Assistance [phone]   Medical Records [phone]       MENTAL HEALTH CRISIS RESOURCES:  For a emergency help, please call 911 or go to the nearest Emergency Department.     Emergency Walk-In Options:   EmPATH Unit @ [facility] ([place]): [phone] - Specialized mental health emergency area designed to be calming  [facility] ([place]): [phone]  [facility] ([place]): [phone]  [facility]): [phone]    [facility] Crisis Information:   [place]: [phone]  [place]: [phone]  [place] ([place]) - Adult: [phone]     Child: [phone]  [place] - Adult: [phone]     Child: [phone]  [place]:  [phone]  List of all [facility] resources:   https://[place].gov/dhs/people-we-serve/adults/health-care/mental-health/resources/crisis-contacts.jsp    National Crisis Information:   Crisis Text Line: Text  [place]  to 465216  Suicide & Crisis Lifeline: 988  National Suicide Prevention Lifeline: 4-517-992-TALK (1-100.370.5945)       For online chat options, visit https://suicidepreventionlifeline.org/chat/  Poison Control Center: 9-865-738-3424  Trans Lifeline: 5-166-722-8746 - Hotline for transgender people of all ages  The Lb Project: 7-527-561-5589 - Hotline for LGBT youth     For Non-Emergency Support:   Fast Tracker: Mental Health & Substance Use Disorder Resources -   https://www.[employer].org/

## 2024-11-11 NOTE — PROGRESS NOTES
Virtual Visit Details    Type of service:  Video Visit     Originating Location (pt. Location): Home  Distant Location (provider location):  Off-site  Platform used for Video Visit: [facility]  Psychiatry Clinic    PSYCHIATRIC PROGRESS NOTE         Lashonda Rodriguez is a 44 year old female who prefers the name Josi and pronouns she, her.  Therapist: couples counseling as needed  PCP: Selma Alba  Other Providers: None     PREVIOUS PSYCH MED TRIALS:  - Provigil (limited effect, 2-3 month trial)  - Nuvigil 250mg (trialed in 2013-ineffective)  - buspar 30mg (unknown)  - Wellbutrin XL (taken with Adderall, two seizures in 2009/2010)  - AMT 10mg for skin picking (unknown)  - NAC (ineffective, unsure about her compliance)     Pertinent Background:  See previous notes.  Psych critical item history includes [no critical items].      Interim History                                                                                                       The patient is a good historian, reports good treatment adherence.    Last seen 5/13/2024 when she chose to continue Adderall 40mg QAM, 20mg Qnoon.    Since the last visit, she's been OK, dealing with perimenopause symptoms.   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)  - noticing periods of 1-2 weeks of moodiness, increased weight around her middle  - she's getting to the gym consistently in the mornings which feels like an investment in herself   but sitting a lot for work  - planning to get a walking pad, she's sitting a lot for work  - they enjoyed a [place] cruise  - active in grad school at  [employer] in Operations and [employer] Chain Management online  - 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, an intense role, this is OK  for now, she hopes her education will open doors  - 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[employer], 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  - her daughters are both in school, Leticia is in an LPN program, Desiree is in animation program at [employer]  - 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    Recent Symptoms:   Depression: PHQ 1, few days of depression  Anxiety: taking Propranolol PRN for anxiety during gigs  - infrequent skin picking     ADVERSE EFFECTS: none  MEDICAL CONCERNS: low libido, hot flashes since her first pregnancy     APPETITE: OK, 171# in Nov 2023, eats vegan   SLEEP: with melatonin 10mg PRN at 9p depending on fatigue, sleeping 6-7 hours      Substance Use:  Alcohol- limits, prefers craft beer  Caffeine- takes No Doze with Adderall for narcolepsy        Social/ Family History                                     FINANCIAL SUPPORT- working as an Operations  at [employer] since 2013  CHILDREN- two daughters (Desiree b. 2003, Leticia b. 2002)       LIVING SITUATION- lives with  Tono (m. 2015) and two daughters      LEGAL- None  EARLY HISTORY/ EDUCATION- Grew up in [place] with half siblings. BS in Biology from [employer]  SOCIAL/ SPIRITUAL SUPPORT- support from family, friends       CULTURAL INFLUENCES/ IMPACT- UNKNOWN       TRAUMA HISTORY- None  FEELS SAFE AT HOME- Yes  FAMILY HISTORY-  daughter Leticia- sertraline for anxiety    Medical / Surgical History                                 Patient Active Problem List   Diagnosis    Major depressive disorder, recurrent episode (H)    Narcolepsy    Bladder spasms    Impulse control disorder    Hx of psychiatric care    Melasma    Rotator cuff tendinitis, left    Diffuse photodamage of skin    Low vitamin D level       Past Surgical History:   Procedure Laterality Date    ECTOPIC PREGNANCY SURGERY      GYN SURGERY  ectopic pregnancy    HC TOOTH EXTRACTION W/FORCEP      PHOTOREFRACTIVE KERATECTOMY Bilateral     March 2021 - Dr. Walden       Medical Review of Systems             Pregnant/ breastfeeding- No    Contraception- IUD, her  had a vasectomy  A comprehensive review of systems was performed and is negative other than noted in the HPI.    Denies head trauma, LOC.      While taking Wellbutrin, history of general tonic clonic seizure 9/20/2009 without aura with postictal confusion; second seizure while with SO on 1/23/10 (tonic clonic; foaming at the mouth with post ictal confusion and lethargy. Narcolepsy diagnosed about 2004. Remote history of febrile convulsion before 3yo. Her daughter has a history of febrile convulsions.     Allergy    Wellbutrin [bupropion] and Pcn [penicillins]     Wellbutrin- two seizures while taking Wellbutrin and Adderall    Current Medications        Current Outpatient Medications   Medication Sig Dispense Refill    amphetamine-dextroamphetamine (ADDERALL) 20 MG tablet TAKE TWO TABLETS BY MOUTH EVERY MORNING AND ONE TABLET AT NOON 90 tablet 0    amphetamine-dextroamphetamine (ADDERALL) 20 MG tablet Take two tabs by mouth every morning and one tab at noon 90 tablet 0    amphetamine-dextroamphetamine (ADDERALL) 20 MG tablet Take two tabs by mouth every morning and one tab at noon 90 tablet 0    azelaic acid (FINACIA) 15 % external gel Apply topically once daily 50 g 2    caffeine (NO-DOZE) 200 MG TABS Take 1 tablet (200 mg) by mouth 2 times daily (Patient taking differently: Take 200 mg by mouth 2 times daily. 400 mg qam and 200 qnoon) 150 tablet     estradiol (ESTRACE) 0.1 MG/GM vaginal cream Place 1 g vaginally twice a week 42.5 g 3    fluocin-hydroquinone-tretinoin (TRI-ALLEN) 0.01-4-0.05 % CREA Apply twice daily to the face. For additional refills, please schedule a follow-up appointment. 30 g 1    melatonin 5 MG tablet Take 10 mg by mouth nightly as needed       Multiple Vitamins-Minerals (MULTIVITAMIN ADULT PO) Take by mouth daily       nitroFURantoin macrocrystal-monohydrate (MACROBID) 100 MG capsule Take  1 capsule (100 mg) by mouth once as needed (post-coital) 10 capsule 2    ondansetron (ZOFRAN) 4 MG tablet Take 4 mg by mouth every 8 hours as needed for nausea      propranolol (INDERAL) 10 MG tablet Take 1-2 tablets (10-20 mg) by mouth daily as needed (performance anxiety) 30 tablet 3    VYLEESI 1.75 MG/0.3ML SOAJ        Vitals            There were no vitals taken for this visit.     Pulse Readings from Last 5 Encounters:   04/02/24 79   12/28/23 74   11/02/23 86   07/26/23 69   07/20/23 76     Wt Readings from Last 5 Encounters:   04/02/24 77.6 kg (171 lb 1.6 oz)   12/28/23 77.6 kg (171 lb)   11/02/23 77.6 kg (171 lb)   07/26/23 78.5 kg (173 lb 1.3 oz)   07/20/23 77.6 kg (171 lb 0.6 oz)     BP Readings from Last 5 Encounters:   04/02/24 111/73   12/28/23 112/74   11/02/23 122/75   07/26/23 115/73   07/20/23 113/73     Mental Status Exam             Alertness: alert  and oriented  Appearance: neatly groomed and dressed  Behavior/Demeanor: cooperative, pleasant and calm, with good eye contact   Speech: normal and regular rate and rhythm  Language: no problems  Psychomotor: seated still  Mood: description consistent with euthymia  Affect: appropriate; was congruent to mood; was congruent to content  Thought Process/Associations: unremarkable  Thought Content:  Reports none;  Denies suicidal ideation, violent ideation, delusions, preoccupations, obsessions , phobia  and magical thinking  Perception:  Reports none;  Denies auditory hallucinations, visual hallucinations, visual distortion seen as shadows , depersonalization and derealization  Insight: fair  Judgment: good  Cognition: (6) does  appear grossly intact; formal cognitive testing was not done  Gait/Station and/or Muscle Strength/Tone:  N/A    Labs and Data                          Rating Scales:      Answers submitted by the patient for this visit:  Patient Health Questionnaire (Submitted on 11/11/2024)  If you checked off any problems, how difficult have  these problems made it for you to do your work, take care of things at home, or get along with other people?: Not difficult at all  PHQ9 TOTAL SCORE: 1    PHQ9 Today:        12/28/2023     2:00 PM 5/13/2024     7:54 AM 11/11/2024     7:42 AM   PHQ   PHQ-9 Total Score 0 8 1    Q9: Thoughts of better off dead/self-harm past 2 weeks Not at all Not at all  Not at all        Patient-reported     Diagnosis      primary narcolepsy 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 (diagnosed by sleep study in 2007)  recurrent MDD in remission     Assessment           Today the following issues were addressed:      : 11/2024- Adderall 20mg #90 last filled on 9/07/2024 and Adderall 10mg #180 last filled on 10/12/2024     PSYCHOTROPIC DRUG INTERACTIONS:  - none with Adderall     Drug Interaction Management: Monitoring for adverse effects, routine vitals, using lowest therapeutic dose of [psychotropics] and patient is aware of risks    Plan                                                                                                                       1) she chooses to continue Adderall 40mg QAM, 20mg Qnoon, Propranolol   - may want to retrial lamotrigine to see if it helps mood swings s/t perimenopause (may journal hormone shifts)  - monitoring Propranolol for social anxiety and if it makes dysphoria worse    - narcolepsy (2004) and seizures (2009/10 while taking Adderall + Wellbutrin) were confirmed by neurology  - takes No Doze 200mg BID for narcolepsy      RTC: 6 months, sooner as need    CRISIS NUMBERS:   Provided routinely in AVS.    Treatment Risk Statement:  The patient understands the risks, benefits, adverse effects and alternatives. Agrees to treatment with the capacity to do so. No medical contraindications to treatment. Agrees to call clinic for any problems. The patient understands to call 911 or go to the nearest ED if life threatening or urgent symptoms occur.     WHODAS 2.0  TODAY total score = N/A; [a 12-item WHODAS 2.0 assessment  was not completed by the pt today and/or recorded in EPIC].    PROVIDER:  AYSE Dumont CNP

## 2024-11-11 NOTE — NURSING NOTE
Current patient location: [address] [place] [id number]    Is the patient currently in the state of [place]? YES    Visit mode:VIDEO    If the visit is dropped, the patient can be reconnected by: VIDEO VISIT: Text to cell phone:   Telephone Information:   Mobile [phone]       Will anyone else be joining the visit? NO  (If patient encounters technical issues they should call [phone]    Are changes needed to the allergy or medication list? No    Are refills needed on medications prescribed by this physician? YES    Rooming Documentation:  Patient will complete questionnaire(s) in [facility]    Reason for visit: RECHECK    Vee SUGGSF

## 2025-01-15 ENCOUNTER — OFFICE VISIT (OUTPATIENT)
Dept: FAMILY MEDICINE | Facility: CLINIC | Age: 45
End: 2025-01-15
Payer: COMMERCIAL

## 2025-01-15 VITALS
HEART RATE: 72 BPM | RESPIRATION RATE: 14 BRPM | TEMPERATURE: 97.2 F | OXYGEN SATURATION: 99 % | WEIGHT: 174 LBS | HEIGHT: 66 IN | SYSTOLIC BLOOD PRESSURE: 109 MMHG | DIASTOLIC BLOOD PRESSURE: 67 MMHG | BODY MASS INDEX: 27.97 KG/M2

## 2025-01-15 DIAGNOSIS — J06.9 UPPER RESPIRATORY TRACT INFECTION, UNSPECIFIED TYPE: Primary | ICD-10-CM

## 2025-01-15 NOTE — PROGRESS NOTES
ASSESSMENT and PLAN:         Upper respiratory infection, likely viral.    Discussed that the cough from viral upper respiratory infections can last 4 to 6 weeks and we are currently seeing strains of viruses that are causing coughs for up to 6 weeks.   Josi felt comfortable that she is on the mend.  She will contact us back if symptoms turn for the worse.        Jorge Nieves MD  Family Medicine and Sports Medicine  [facility]      Medical assistant intake:  Lashonda Rodriguez is a 44 year old female who presents to [facility] today for Cold Symptoms (Cold started the day after [place]. Feeling better but still feeling some congestion in face and chest that she can't get rid of. )      SUBJECTIVE:   This is my first time meeting Josi. She's a 45 yo here with symptoms of a URI.   Symptoms have been present for about 3 weeks.  She is actually feeling much better over the past few days but decided to have an evaluation as her daughter is also here having evaluation.  Does still have occasional cough and some nasal congestion.  Not having any fever or sweats or chills.  Eating and drinking well.  Frustrated that symptoms are persisting but overall feeling that she is improving.    Review Of Systems:    See subjective.   Has otherwise been in usual state of health    Problem list per EMR:  Patient Active Problem List   Diagnosis    Major depressive disorder, recurrent episode    Narcolepsy    Bladder spasms    Impulse control disorder    Hx of psychiatric care    Melasma    Rotator cuff tendinitis, left    Diffuse photodamage of skin    Low vitamin D level       Current Outpatient Medications   Medication Sig Dispense Refill    amphetamine-dextroamphetamine (ADDERALL) 20 MG tablet Take two tabs by mouth every morning and one tab at noon 90 tablet 0    amphetamine-dextroamphetamine (ADDERALL) 20 MG tablet Take two tabs by mouth every morning and one tab at noon 90 tablet 0     "amphetamine-dextroamphetamine (ADDERALL) 20 MG tablet TAKE TWO TABLETS BY MOUTH EVERY MORNING AND ONE TABLET AT NOON 90 tablet 0    azelaic acid (FINACIA) 15 % external gel Apply topically once daily 50 g 2    caffeine (NO-DOZE) 200 MG TABS Take 1 tablet (200 mg) by mouth 2 times daily (Patient taking differently: Take 200 mg by mouth 2 times daily. 400 mg qam and 200 qnoon) 150 tablet     estradiol (ESTRACE) 0.1 MG/GM vaginal cream Place 1 g vaginally twice a week 42.5 g 3    fluocin-hydroquinone-tretinoin (TRI-ALLEN) 0.01-4-0.05 % CREA Apply twice daily to the face. For additional refills, please schedule a follow-up appointment. 30 g 1    melatonin 5 MG tablet Take 10 mg by mouth nightly as needed       Multiple Vitamins-Minerals (MULTIVITAMIN ADULT PO) Take by mouth daily       nitroFURantoin macrocrystal-monohydrate (MACROBID) 100 MG capsule Take 1 capsule (100 mg) by mouth once as needed (post-coital) 10 capsule 2    ondansetron (ZOFRAN) 4 MG tablet Take 4 mg by mouth every 8 hours as needed for nausea      propranolol (INDERAL) 10 MG tablet Take 1-2 tablets (10-20 mg) by mouth daily as needed (performance anxiety) 30 tablet 3    VYLEESI 1.75 MG/0.3ML SOAJ          Allergies   Allergen Reactions    Wellbutrin [Bupropion] Other (See Comments)     Two seizures while on Adderall and Wellbutrin    Pcn [Penicillins] Rash          OBJECTIVE    Vitals: /67 (BP Location: Left arm, Patient Position: Sitting, Cuff Size: Adult Regular)   Pulse 72   Temp 97.2  F (36.2  C) (Skin)   Resp 14   Ht 1.67 m (5' 5.75\")   Wt 78.9 kg (174 lb)   SpO2 99%   BMI 28.30 kg/m    BMI= Body mass index is 28.3 kg/m .    Appears in no distress.  She has a normal exam including clear tympanic membranes, clear oropharynx, patent nasal passages, no sinus tenderness.    Neck is supple without lymphadenopathy.    Cardiovascular-regular rate and rhythm without murmurs.    Lungs are clear to auscultation throughout.    SEE TOP OF NOTE " FOR ASSESSMENT AND PLAN    --Jorge Nieves MD  [facility], Department of Family Medicine and Community Health

## 2025-01-15 NOTE — NURSING NOTE
"44 year old  Chief Complaint   Patient presents with    Cold Symptoms     Cold started the day after [place]. Feeling better but still feeling some congestion in face and chest that she can't get rid of.        Blood pressure 109/67, pulse 72, temperature 97.2  F (36.2  C), temperature source Skin, resp. rate 14, height 1.67 m (5' 5.75\"), weight 78.9 kg (174 lb), SpO2 99%. Body mass index is 28.3 kg/m .  Patient Active Problem List   Diagnosis    Major depressive disorder, recurrent episode    Narcolepsy    Bladder spasms    Impulse control disorder     of psychiatric care    Melasma    Rotator cuff tendinitis, left    Diffuse photodamage of skin    Low vitamin D level       Wt Readings from Last 2 Encounters:   01/15/25 78.9 kg (174 lb)   04/02/24 77.6 kg (171 lb 1.6 oz)     BP Readings from Last 3 Encounters:   01/15/25 109/67   04/02/24 111/73   12/28/23 112/74         Current Outpatient Medications   Medication Sig Dispense Refill    amphetamine-dextroamphetamine (ADDERALL) 20 MG tablet Take two tabs by mouth every morning and one tab at noon 90 tablet 0    amphetamine-dextroamphetamine (ADDERALL) 20 MG tablet Take two tabs by mouth every morning and one tab at noon 90 tablet 0    amphetamine-dextroamphetamine (ADDERALL) 20 MG tablet TAKE TWO TABLETS BY MOUTH EVERY MORNING AND ONE TABLET AT NOON 90 tablet 0    azelaic acid (FINACIA) 15 % external gel Apply topically once daily 50 g 2    caffeine (NO-DOZE) 200 MG TABS Take 1 tablet (200 mg) by mouth 2 times daily (Patient taking differently: Take 200 mg by mouth 2 times daily. 400 mg qam and 200 qnoon) 150 tablet     estradiol (ESTRACE) 0.1 MG/GM vaginal cream Place 1 g vaginally twice a week 42.5 g 3    fluocin-hydroquinone-tretinoin (TRI-ALLEN) 0.01-4-0.05 % CREA Apply twice daily to the face. For additional refills, please schedule a follow-up appointment. 30 g 1    melatonin 5 MG tablet Take 10 mg by mouth nightly as needed       Multiple Vitamins-Minerals " (MULTIVITAMIN ADULT PO) Take by mouth daily       nitroFURantoin macrocrystal-monohydrate (MACROBID) 100 MG capsule Take 1 capsule (100 mg) by mouth once as needed (post-coital) 10 capsule 2    ondansetron (ZOFRAN) 4 MG tablet Take 4 mg by mouth every 8 hours as needed for nausea      propranolol (INDERAL) 10 MG tablet Take 1-2 tablets (10-20 mg) by mouth daily as needed (performance anxiety) 30 tablet 3    VYLEESI 1.75 MG/0.3ML SOAJ        No current facility-administered medications for this visit.       Social History     Tobacco Use    Smoking status: Former     Current packs/day: 0.00     Types: Cigarettes     Quit date: 2008     Years since quittin.0     Passive exposure: Never    Smokeless tobacco: Never   Vaping Use    Vaping status: Never Used   Substance Use Topics    Alcohol use: Yes     Alcohol/week: 4.0 - 5.0 standard drinks of alcohol     Types: 4 - 5 Standard drinks or equivalent per week     Comment: 3 beer every 2 days/week    Drug use: No       Health Maintenance Due   Topic Date Due    ADVANCE CARE PLANNING  Never done    MAMMO SCREENING  Never done    HEPATITIS B IMMUNIZATION (3 of 3 - Hep B Twinrix 3-dose series) 2014    GLUCOSE  2016    LIPID  Never done    PHQ-9  2025       Lab Results   Component Value Date    PAP NIL 10/08/2020         January 15, 2025 4:09 PM

## 2025-03-03 ENCOUNTER — OFFICE VISIT (OUTPATIENT)
Dept: FAMILY MEDICINE | Facility: CLINIC | Age: 45
End: 2025-03-03
Payer: COMMERCIAL

## 2025-03-03 VITALS
TEMPERATURE: 97.6 F | HEART RATE: 72 BPM | RESPIRATION RATE: 14 BRPM | BODY MASS INDEX: 28.3 KG/M2 | HEIGHT: 66 IN | SYSTOLIC BLOOD PRESSURE: 111 MMHG | OXYGEN SATURATION: 99 % | DIASTOLIC BLOOD PRESSURE: 72 MMHG

## 2025-03-03 DIAGNOSIS — Z20.828 EXPOSURE TO MONONUCLEOSIS SYNDROME: Primary | ICD-10-CM

## 2025-03-03 LAB — HOLD SPECIMEN: NORMAL

## 2025-03-03 NOTE — PROGRESS NOTES
ASSESSMENT and PLAN:    1. Exposure to mononucleosis syndrome (Primary)  Will assess  - EBV Capsid Antibody IgM; Future  - EBV Capsid Antibody IgG; Future      Jorge Nieves MD  Family Medicine and Sports Medicine  [facility]      Medical assistant intake:  Lashonda Rodriguez is a 45 year old female who presents to [facility] today for URI ( has mono, not currently symptomatic hoping to get tested for antibodies. )      SUBJECTIVE:   Josi is a 46 yo hoping to get tested for antibodies to mononucleosis.    Her  has recently been tested after having symptoms and was found to be positive.    She currently has no symptoms. Had a viral URI a few weeks ago with a cough and a sore throat at that time. Those symptoms resolved.  They are about to go on vacation.    Review Of Systems:     as per above, now in usual state of health:    Cardiovascular: negative  Respiratory: No shortness of breath, dyspnea on exertion, cough, or hemoptysis  Gastrointestinal: negative  Genitourinary: negative    Problem list per EMR:  Patient Active Problem List   Diagnosis    Major depressive disorder, recurrent episode    Narcolepsy    Bladder spasms    Impulse control disorder    Hx of psychiatric care    Melasma    Rotator cuff tendinitis, left    Diffuse photodamage of skin    Low vitamin D level       Current Outpatient Medications   Medication Sig Dispense Refill    amphetamine-dextroamphetamine (ADDERALL) 20 MG tablet Take two tabs by mouth every morning and one tab at noon 90 tablet 0    amphetamine-dextroamphetamine (ADDERALL) 20 MG tablet Take two tabs by mouth every morning and one tab at noon 90 tablet 0    amphetamine-dextroamphetamine (ADDERALL) 20 MG tablet TAKE TWO TABLETS BY MOUTH EVERY MORNING AND ONE TABLET AT NOON 90 tablet 0    azelaic acid (FINACIA) 15 % external gel Apply topically once daily 50 g 2    caffeine (NO-DOZE) 200 MG TABS Take 1 tablet (200 mg) by mouth 2 times daily (Patient  "taking differently: Take 200 mg by mouth 2 times daily. 400 mg qam and 200 qnoon) 150 tablet     estradiol (ESTRACE) 0.1 MG/GM vaginal cream Place 1 g vaginally twice a week 42.5 g 3    fluocin-hydroquinone-tretinoin (TRI-ALLEN) 0.01-4-0.05 % CREA Apply twice daily to the face. For additional refills, please schedule a follow-up appointment. 30 g 1    melatonin 5 MG tablet Take 10 mg by mouth nightly as needed       Multiple Vitamins-Minerals (MULTIVITAMIN ADULT PO) Take by mouth daily       nitroFURantoin macrocrystal-monohydrate (MACROBID) 100 MG capsule Take 1 capsule (100 mg) by mouth once as needed (post-coital) 10 capsule 2    propranolol (INDERAL) 10 MG tablet Take 1-2 tablets (10-20 mg) by mouth daily as needed (performance anxiety) 30 tablet 3    ondansetron (ZOFRAN) 4 MG tablet Take 4 mg by mouth every 8 hours as needed for nausea      VYLEESI 1.75 MG/0.3ML SOAJ          Allergies   Allergen Reactions    Wellbutrin [Bupropion] Other (See Comments)     Two seizures while on Adderall and Wellbutrin    Pcn [Penicillins] Rash          OBJECTIVE    Vitals: /72 (BP Location: Left arm, Patient Position: Sitting, Cuff Size: Adult Regular)   Pulse 72   Temp 97.6  F (36.4  C) (Skin)   Resp 14   Ht 1.67 m (5' 5.75\")   SpO2 99%   BMI 28.30 kg/m    BMI= Body mass index is 28.3 kg/m .   appears well and in no distress.  Oropharynx is clear.  Neck is supple and without lymphadenopathy.    SEE TOP OF NOTE FOR ASSESSMENT AND PLAN    --Jorge Nieves MD  [facility], Department of Family Medicine and Community Health  "

## 2025-03-03 NOTE — NURSING NOTE
"45 year old  Chief Complaint   Patient presents with    URI      has mono, not currently symptomatic hoping to get tested for antibodies.        Blood pressure 111/72, pulse 72, temperature 97.6  F (36.4  C), temperature source Skin, resp. rate 14, height 1.67 m (5' 5.75\"), SpO2 99%. Body mass index is 28.3 kg/m .  Patient Active Problem List   Diagnosis    Major depressive disorder, recurrent episode    Narcolepsy    Bladder spasms    Impulse control disorder    Hx of psychiatric care    Melasma    Rotator cuff tendinitis, left    Diffuse photodamage of skin    Low vitamin D level       Wt Readings from Last 2 Encounters:   01/15/25 78.9 kg (174 lb)   04/02/24 77.6 kg (171 lb 1.6 oz)     BP Readings from Last 3 Encounters:   03/03/25 111/72   01/15/25 109/67   04/02/24 111/73         Current Outpatient Medications   Medication Sig Dispense Refill    amphetamine-dextroamphetamine (ADDERALL) 20 MG tablet Take two tabs by mouth every morning and one tab at noon 90 tablet 0    amphetamine-dextroamphetamine (ADDERALL) 20 MG tablet Take two tabs by mouth every morning and one tab at noon 90 tablet 0    amphetamine-dextroamphetamine (ADDERALL) 20 MG tablet TAKE TWO TABLETS BY MOUTH EVERY MORNING AND ONE TABLET AT NOON 90 tablet 0    azelaic acid (FINACIA) 15 % external gel Apply topically once daily 50 g 2    caffeine (NO-DOZE) 200 MG TABS Take 1 tablet (200 mg) by mouth 2 times daily (Patient taking differently: Take 200 mg by mouth 2 times daily. 400 mg qam and 200 qnoon) 150 tablet     estradiol (ESTRACE) 0.1 MG/GM vaginal cream Place 1 g vaginally twice a week 42.5 g 3    fluocin-hydroquinone-tretinoin (TRI-ALLEN) 0.01-4-0.05 % CREA Apply twice daily to the face. For additional refills, please schedule a follow-up appointment. 30 g 1    melatonin 5 MG tablet Take 10 mg by mouth nightly as needed       Multiple Vitamins-Minerals (MULTIVITAMIN ADULT PO) Take by mouth daily       nitroFURantoin " macrocrystal-monohydrate (MACROBID) 100 MG capsule Take 1 capsule (100 mg) by mouth once as needed (post-coital) 10 capsule 2    propranolol (INDERAL) 10 MG tablet Take 1-2 tablets (10-20 mg) by mouth daily as needed (performance anxiety) 30 tablet 3    ondansetron (ZOFRAN) 4 MG tablet Take 4 mg by mouth every 8 hours as needed for nausea      VYLEESI 1.75 MG/0.3ML SOAJ        No current facility-administered medications for this visit.       Social History     Tobacco Use    Smoking status: Former     Current packs/day: 0.00     Types: Cigarettes     Quit date: 2008     Years since quittin.1     Passive exposure: Never    Smokeless tobacco: Never   Vaping Use    Vaping status: Never Used   Substance Use Topics    Alcohol use: Yes     Alcohol/week: 4.0 - 5.0 standard drinks of alcohol     Types: 4 - 5 Standard drinks or equivalent per week     Comment: 3 beer every 2 days/week    Drug use: No       Health Maintenance Due   Topic Date Due    ADVANCE CARE PLANNING  Never done    MAMMO SCREENING  Never done    HEPATITIS B IMMUNIZATION (3 of 3 - Hep B Twinrix 3-dose series) 2014    GLUCOSE  2016    LIPID  Never done    PHQ-9  2025    YEARLY PREVENTIVE VISIT  2025       Lab Results   Component Value Date    PAP NIL 10/08/2020         March 3, 2025 12:53 PM

## 2025-03-04 LAB
EBV VCA IGG SER IA-ACNC: 218 U/ML
EBV VCA IGG SER IA-ACNC: POSITIVE

## 2025-03-05 LAB
EBV VCA IGM SER IA-ACNC: <10 U/ML
EBV VCA IGM SER IA-ACNC: NORMAL

## 2025-03-18 ENCOUNTER — MYC REFILL (OUTPATIENT)
Dept: PSYCHIATRY | Facility: CLINIC | Age: 45
End: 2025-03-18

## 2025-03-18 DIAGNOSIS — G47.419 PRIMARY NARCOLEPSY WITHOUT CATAPLEXY: ICD-10-CM

## 2025-03-18 RX ORDER — DEXTROAMPHETAMINE SACCHARATE, AMPHETAMINE ASPARTATE, DEXTROAMPHETAMINE SULFATE AND AMPHETAMINE SULFATE 5; 5; 5; 5 MG/1; MG/1; MG/1; MG/1
TABLET ORAL
Qty: 90 TABLET | Refills: 0 | Status: SHIPPED | OUTPATIENT
Start: 2025-03-18

## 2025-03-18 NOTE — TELEPHONE ENCOUNTER
Last seen: 11/11/2024  RTC: 6 Months  Any patient initiated cancellations or no shows since last visit? No  Next appt: 04/02/2025  Last filled: 01/31/2025     Incoming refill from patient via [email or web address] by patient or caregiver    Medication requested:   Pending Prescriptions:                       Disp   Refills    amphetamine-dextroamphetamine (ADDERALL) *90 tab*0            Sig: Take two tabs by mouth every morning and one tab           at noon      From chart note:   he chooses to continue Adderall 40mg QAM, 20mg Qnoon, Propranolol   - may want to retrial lamotrigine to see if it helps mood swings s/t perimenopause (may journal hormone shifts)  - monitoring Propranolol for social anxiety and if it makes dysphoria worse    Is note signed/closed? Yes    Is this a 90 day request for a psych medication? No    LPNs - Please check  if controlled and send to provider  Others - Send to RNs

## 2025-04-02 ENCOUNTER — VIRTUAL VISIT (OUTPATIENT)
Dept: PSYCHIATRY | Facility: CLINIC | Age: 45
End: 2025-04-02
Attending: NURSE PRACTITIONER
Payer: COMMERCIAL

## 2025-04-02 DIAGNOSIS — G47.419 PRIMARY NARCOLEPSY WITHOUT CATAPLEXY: ICD-10-CM

## 2025-04-02 RX ORDER — DEXTROAMPHETAMINE SACCHARATE, AMPHETAMINE ASPARTATE, DEXTROAMPHETAMINE SULFATE AND AMPHETAMINE SULFATE 5; 5; 5; 5 MG/1; MG/1; MG/1; MG/1
TABLET ORAL
Qty: 90 TABLET | Refills: 0 | Status: SHIPPED | OUTPATIENT
Start: 2025-06-11

## 2025-04-02 RX ORDER — DEXTROAMPHETAMINE SACCHARATE, AMPHETAMINE ASPARTATE, DEXTROAMPHETAMINE SULFATE AND AMPHETAMINE SULFATE 5; 5; 5; 5 MG/1; MG/1; MG/1; MG/1
TABLET ORAL
Qty: 90 TABLET | Refills: 0 | Status: SHIPPED | OUTPATIENT
Start: 2025-04-16

## 2025-04-02 RX ORDER — DEXTROAMPHETAMINE SACCHARATE, AMPHETAMINE ASPARTATE, DEXTROAMPHETAMINE SULFATE AND AMPHETAMINE SULFATE 5; 5; 5; 5 MG/1; MG/1; MG/1; MG/1
TABLET ORAL
Qty: 90 TABLET | Refills: 0 | Status: SHIPPED | OUTPATIENT
Start: 2025-05-14

## 2025-04-02 ASSESSMENT — PATIENT HEALTH QUESTIONNAIRE - PHQ9
10. IF YOU CHECKED OFF ANY PROBLEMS, HOW DIFFICULT HAVE THESE PROBLEMS MADE IT FOR YOU TO DO YOUR WORK, TAKE CARE OF THINGS AT HOME, OR GET ALONG WITH OTHER PEOPLE: NOT DIFFICULT AT ALL
SUM OF ALL RESPONSES TO PHQ QUESTIONS 1-9: 0
SUM OF ALL RESPONSES TO PHQ QUESTIONS 1-9: 0

## 2025-04-02 ASSESSMENT — PAIN SCALES - GENERAL: PAINLEVEL_OUTOF10: NO PAIN (0)

## 2025-04-02 NOTE — NURSING NOTE
Current patient location: [address] [place] [id number]    Is the patient currently in the state of [place]? YES    Visit mode: VIDEO    If the visit is dropped, the patient can be reconnected by:VIDEO VISIT: Text to cell phone:   Telephone Information:   Mobile [phone]       Will anyone else be joining the visit? NO  (If patient encounters technical issues they should call [phone]    Are changes needed to the allergy or medication list? Yes please remove meds flagged for removal:   -amphetamine-dextroamphetamine (ADDERALL) 20 MG tablet (2 Total Duplicates)  -ondansetron (ZOFRAN) 4 MG tablet   -VYLEESI 1.75 MG/0.3ML SOAJ     Are refills needed on medications prescribed by this physician? YES    Rooming Documentation:  Questionnaire(s) completed    Reason for visit: RHIANNA Slater VVF

## 2025-04-02 NOTE — PATIENT INSTRUCTIONS
**For crisis resources, please see the information at the end of this document**   Patient Education    Thank you for coming to the [facility] [place] [facility].     Lab Testing:  If you had lab testing today and your results are reassuring or normal they will be mailed to you or sent through Classana within 7 days. If the lab tests need quick action we will call you with the results. The phone number we will call with results is # [phone]. If this is not the best number please call our clinic and change the number.     Medication Refills:  If you need any refills please call your pharmacy and they will contact us. Our fax number for refills is [phone].   Three business days of notice are needed for general medication refill requests.   Five business days of notice are needed for controlled substance refill requests.   If you need to change to a different pharmacy, please contact the new pharmacy directly. The new pharmacy will help you get your medications transferred.     Contact Us:  Please call [phone] during business hours (8-5:00 M-F).   If you have medication related questions after clinic hours, or on the weekend, please call [phone].     Financial Assistance [phone]   Medical Records [phone]       MENTAL HEALTH CRISIS RESOURCES:  For a emergency help, please call 911 or go to the nearest Emergency Department.     Emergency Walk-In Options:   EmPATH Unit @ [facility] ([place]): [phone] - Specialized mental health emergency area designed to be calming  [facility] ([place]): [phone]  [facility] ([place]): [phone]  [facility]): [phone]    [facility] Crisis Information:   [place]: [phone]  [place]: [phone]  [place] ([place]) - Adult: [phone]     Child: [phone]  [place] - Adult: [phone]     Child: [phone]  [place]:  [phone]  List of all [facility] resources:   https://[place].gov/dhs/people-we-serve/adults/health-care/mental-health/resources/crisis-contacts.jsp    National Crisis Information:   Crisis Text Line: Text  [place]  to 239038  Suicide & Crisis Lifeline: 988  National Suicide Prevention Lifeline: 7-364-918-TALK (1-564.866.3438)       For online chat options, visit https://suicidepreventionlifeline.org/chat/  Poison Control Center: 2-062-916-1427  Trans Lifeline: 5-002-047-4095 - Hotline for transgender people of all ages  The Lb Project: 1-692-957-0860 - Hotline for LGBT youth     For Non-Emergency Support:   Fast Tracker: Mental Health & Substance Use Disorder Resources -   https://www.RentBitsckCardiosolutionsn.org/

## 2025-04-02 NOTE — PROGRESS NOTES
Virtual Visit Details    Type of service:  Video Visit     Originating Location (pt. Location): Home  Distant Location (provider location):  Off-site  Platform used for Video Visit: [facility]  Psychiatry Clinic    PSYCHIATRIC PROGRESS NOTE         Lashonda Rodriguez is a 45 year old female who prefers the name Josi and pronouns she, her.  Therapist: couples counseling as needed  PCP: Selma Alba  Other Providers: None     PREVIOUS PSYCH MED TRIALS:  - Provigil (limited effect, 2-3 month trial)  - Nuvigil 250mg (trialed in 2013-ineffective)  - buspar 30mg (unknown)  - Wellbutrin XL (taken with Adderall, two seizures in 2009/2010)  - AMT 10mg for skin picking (unknown)  - NAC (ineffective, unsure about her compliance)     Pertinent Background:  See previous notes.  Psych critical item history includes [no critical items].      Interim History                                                                                                       The patient is a good historian, reports good treatment adherence.    Last seen 11/11/2024 when she chose to continue Adderall 40mg QAM, 20mg Qnoon, Propranolol 10mg-20mg daily PRN.    Since the last visit, she's been OK.   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  - working ahead on her school work so she can enjoy their out of town trip this weekend  - 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, an intense role    - 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[employer], 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  - her daughters are both in school, Leticia is in an LPN program, Desiree is in animation program at [employer]    Recent Symptoms:   Depression: PHQ 0  Anxiety: taking Propranolol PRN for anxiety during gigs  - infrequent skin picking     ADVERSE EFFECTS: none  MEDICAL CONCERNS: low libido, no hot flashes; wonders about a B12 level/ injection     APPETITE: OK, 174# in Jan 2025, eats vegan   SLEEP: with melatonin 10mg PRN, sleeping 6-7 hours      Substance Use:  Alcohol- limits, prefers craft beer  Caffeine- takes No Doze with Adderall for narcolepsy        Social/ Family History                                     FINANCIAL SUPPORT- 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 2003, Leticia b. 2002)       LIVING SITUATION- lives with  Tono (m. 2015) and two daughters      LEGAL- None  EARLY HISTORY/ EDUCATION- Grew up in [place] with half siblings. BS in Biology from [employer]  SOCIAL/ SPIRITUAL SUPPORT- support from family, friends       CULTURAL INFLUENCES/ IMPACT- UNKNOWN       TRAUMA HISTORY- None  FEELS SAFE AT HOME- Yes  FAMILY HISTORY-  daughter Leticia- sertraline for anxiety    Medical / Surgical History                                 Patient Active Problem List   Diagnosis    Major depressive disorder, recurrent episode    Narcolepsy    Bladder spasms    Impulse control disorder    Hx of psychiatric care    Melasma    Rotator cuff tendinitis, left    Diffuse photodamage of skin    Low vitamin D level       Past Surgical History:   Procedure Laterality Date    ECTOPIC PREGNANCY SURGERY      GYN SURGERY  ectopic pregnancy    HC TOOTH EXTRACTION W/FORCEP      PHOTOREFRACTIVE KERATECTOMY Bilateral     March 2021 - Dr. Walden      Medical Review of Systems             Pregnant/ breastfeeding- No    Contraception- IUD, her   had a vasectomy  A comprehensive review of systems was performed and is negative other than noted in the HPI.    Denies head trauma, LOC.      While taking Wellbutrin, history of general tonic clonic seizure 9/20/2009 without aura with postictal confusion; second seizure while with SO on 1/23/10 (tonic clonic; foaming at the mouth with post ictal confusion and lethargy. Narcolepsy diagnosed about 2004. Remote history of febrile convulsion before 3yo. Her daughter has a history of febrile convulsions.     Allergy    Wellbutrin [bupropion] and Pcn [penicillins]     Wellbutrin- two seizures while taking Wellbutrin and Adderall    Current Medications        Current Outpatient Medications   Medication Sig Dispense Refill    amphetamine-dextroamphetamine (ADDERALL) 20 MG tablet Take two tabs by mouth every morning and one tab at noon 90 tablet 0    azelaic acid (FINACIA) 15 % external gel Apply topically once daily (Patient taking differently: as needed. Apply topically once daily prn) 50 g 2    caffeine (NO-DOZE) 200 MG TABS Take 1 tablet (200 mg) by mouth 2 times daily (Patient taking differently: Take 200 mg by mouth 2 times daily. 400 mg qam and 200 qnoon) 150 tablet     estradiol (ESTRACE) 0.1 MG/GM vaginal cream Place 1 g vaginally twice a week 42.5 g 3    fluocin-hydroquinone-tretinoin (TRI-ALLEN) 0.01-4-0.05 % CREA Apply twice daily to the face. For additional refills, please schedule a follow-up appointment. (Patient taking differently: as needed. Apply twice daily to the face. For additional refills, please schedule a follow-up appointment.) 30 g 1    melatonin 5 MG tablet Take 10 mg by mouth nightly as needed       Multiple Vitamins-Minerals (MULTIVITAMIN ADULT PO) Take by mouth daily       nitroFURantoin macrocrystal-monohydrate (MACROBID) 100 MG capsule Take 1 capsule (100 mg) by mouth once as needed (post-coital) 10 capsule 2    propranolol (INDERAL) 10 MG tablet Take 1-2 tablets (10-20 mg) by mouth  daily as needed (performance anxiety) 30 tablet 3    amphetamine-dextroamphetamine (ADDERALL) 20 MG tablet Take two tabs by mouth every morning and one tab at noon 90 tablet 0    amphetamine-dextroamphetamine (ADDERALL) 20 MG tablet TAKE TWO TABLETS BY MOUTH EVERY MORNING AND ONE TABLET AT NOON 90 tablet 0    ondansetron (ZOFRAN) 4 MG tablet Take 4 mg by mouth every 8 hours as needed for nausea      VYLEESI 1.75 MG/0.3ML SOAJ        Vitals            There were no vitals taken for this visit.     Pulse Readings from Last 5 Encounters:   03/03/25 72   01/15/25 72   04/02/24 79   12/28/23 74   11/02/23 86     Wt Readings from Last 5 Encounters:   01/15/25 78.9 kg (174 lb)   04/02/24 77.6 kg (171 lb 1.6 oz)   12/28/23 77.6 kg (171 lb)   11/02/23 77.6 kg (171 lb)   07/26/23 78.5 kg (173 lb 1.3 oz)     BP Readings from Last 5 Encounters:   03/03/25 111/72   01/15/25 109/67   04/02/24 111/73   12/28/23 112/74   11/02/23 122/75     Mental Status Exam             Alertness: alert  and oriented  Appearance: neatly groomed and dressed  Behavior/Demeanor: cooperative, pleasant and calm, with good eye contact   Speech: normal and regular rate and rhythm  Language: no problems  Psychomotor: seated still  Mood: description consistent with euthymia  Affect: appropriate; was congruent to mood; was congruent to content  Thought Process/Associations: unremarkable  Thought Content:  Reports none;  Denies suicidal ideation, violent ideation, delusions, preoccupations, obsessions , phobia  and magical thinking  Perception:  Reports none;  Denies auditory hallucinations, visual hallucinations, visual distortion seen as shadows , depersonalization and derealization  Insight: fair  Judgment: good  Cognition: (6) does  appear grossly intact; formal cognitive testing was not done  Gait/Station and/or Muscle Strength/Tone:  N/A    Labs and Data                          Rating Scales:      Answers submitted by the patient for this  visit:  Patient Health Questionnaire (Submitted on 4/2/2025)  If you checked off any problems, how difficult have these problems made it for you to do your work, take care of things at home, or get along with other people?: Not difficult at all  PHQ9 TOTAL SCORE: 0    PHQ9 Today:        5/13/2024     7:54 AM 11/11/2024     7:42 AM 4/2/2025     8:14 AM   PHQ   PHQ-9 Total Score 8 1  0    Q9: Thoughts of better off dead/self-harm past 2 weeks Not at all Not at all Not at all       Patient-reported     Diagnosis      primary narcolepsy 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 (diagnosed by sleep study in 2007)  recurrent MDD in remission     Assessment           Today the following issues were addressed:      : 3/2025:          PSYCHOTROPIC DRUG INTERACTIONS:  - none with Adderall     Drug Interaction Management: Monitoring for adverse effects, routine vitals, using lowest therapeutic dose of [psychotropics] and patient is aware of risks    Plan                                                                                                                       1) she chooses to continue Adderall 40mg QAM  (Adderall goes to [facility]), 20mg Qnoon, Propranolol 10mg-20mg daily PRN (has)  - monitoring Propranolol for worsening dysphoria, she's mindful a dose on Thursday doesn't allow her to get her heart rate up for a Friday performance    - narcolepsy (2004) and seizures (2009/10 while taking Adderall + Wellbutrin) were confirmed by neurology  - takes No Doze 200mg BID for narcolepsy      RTC: 6 months, sooner as need    Level of Medical Decision Making:   - At least 2 stable chronic problems  - Engaged in prescription drug management during visit (discussed any medication benefits, side effects, alternatives, etc.)     The longitudinal plan of care for the diagnosis(es)/condition(s) as documented were addressed during this visit. Due to the added complexity in care, I will continue to support Josi in the subsequent management and with ongoing  continuity of care.    CRISIS NUMBERS:   Provided routinely in AVS.    Treatment Risk Statement:  The patient understands the risks, benefits, adverse effects and alternatives. Agrees to treatment with the capacity to do so. No medical contraindications to treatment. Agrees to call clinic for any problems. The patient understands to call 911 or go to the nearest ED if life threatening or urgent symptoms occur.     WHODAS 2.0  TODAY total score = N/A; [a 12-item WHODAS 2.0 assessment was not completed by the pt today and/or recorded in EPIC].    PROVIDER:  AYSE Dumont CNP

## 2025-05-11 ENCOUNTER — HEALTH MAINTENANCE LETTER (OUTPATIENT)
Age: 45
End: 2025-05-11

## 2025-05-21 ENCOUNTER — E-VISIT (OUTPATIENT)
Dept: URGENT CARE | Facility: CLINIC | Age: 45
End: 2025-05-21
Payer: COMMERCIAL

## 2025-05-21 DIAGNOSIS — B96.89 BACTERIAL VAGINOSIS: Primary | ICD-10-CM

## 2025-05-21 DIAGNOSIS — N76.0 BACTERIAL VAGINOSIS: Primary | ICD-10-CM

## 2025-05-22 RX ORDER — METRONIDAZOLE 7.5 MG/G
1 GEL VAGINAL DAILY
Qty: 70 G | Refills: 0 | Status: SHIPPED | OUTPATIENT
Start: 2025-05-22 | End: 2025-05-27

## 2025-05-22 NOTE — PATIENT INSTRUCTIONS
"Thank you for choosing us for your care. I have placed an order for a prescription so that you can start treatment. View your full visit summary for details by clicking on the link below. Your pharmacist will able to address any questions you may have about the medication.     If you re not feeling better within 2-3 days, please schedule an appointment.  You can schedule an appointment right here in [facility], or call [phone]  If the visit is for the same symptoms as your eVisit, we ll refund the cost of your eVisit if seen within seven days.    Bacterial Vaginosis: Care Instructions  Overview     Bacterial vaginosis is a condition in which there is excess growth of certain bacteria that are normally found in the vagina. Symptoms often include abnormal gray or yellow discharge with a \"fishy\" odor. It is not considered an infection that is spread through sexual contact.  Symptoms can be annoying and uncomfortable. But bacterial vaginosis does not usually cause other health problems. However, in some cases it can lead to more serious issues.  While bacterial vaginosis may go away on its own, most doctors use antibiotics to treat it. You may have been prescribed pills or vaginal cream. With treatment, bacterial vaginosis usually clears up in 5 to 7 days.  Follow-up care is a key part of your treatment and safety. Be sure to make and go to all appointments, and call your doctor if you are having problems. It's also a good idea to know your test results and keep a list of the medicines you take.  How can you care for yourself at home?  Take your antibiotics as directed. Do not stop taking them just because you feel better. You need to take the full course of antibiotics.  Do not eat or drink anything that contains alcohol if you are taking metronidazole or tinidazole.  Keep using your medicine if you start your period. Use pads instead of tampons while using a vaginal cream or suppository. Tampons can absorb the " "medicine.  Wear loose cotton clothing. Do not wear nylon and other materials that hold body heat and moisture close to the skin.  Do not scratch. Relieve itching with a cold pack or a cool bath.  Do not wash your vulva more than once a day. Use plain water or a mild, unscented soap. Do not douche.  When should you call for help?   Call your doctor now or seek immediate medical care if:    You have a fever.     You have new or worse pain in your vagina or pelvis.   Watch closely for changes in your health, and be sure to contact your doctor if:    You have new or worse vaginal itching or discharge.     You have unexpected vaginal bleeding.     You are not getting better as expected.     Your symptoms return after you finish the course of your medicine.   Where can you learn more?  Go to [email or web address]://[email or web address].net/patiented  Enter X360 in the search box to learn more about \"Bacterial Vaginosis: Care Instructions.\"  Current as of: April 30, 2024  Content Version: 14.4    5150-1487 [employer].   Care instructions adapted under license by your healthcare professional. If you have questions about a medical condition or this instruction, always ask your healthcare professional. [employer] disclaims any warranty or liability for your use of this information.    "

## 2025-06-04 ENCOUNTER — LAB (OUTPATIENT)
Dept: LAB | Facility: CLINIC | Age: 45
End: 2025-06-04
Attending: ADVANCED PRACTICE MIDWIFE
Payer: COMMERCIAL

## 2025-06-04 ENCOUNTER — OFFICE VISIT (OUTPATIENT)
Dept: OBGYN | Facility: CLINIC | Age: 45
End: 2025-06-04
Attending: ADVANCED PRACTICE MIDWIFE
Payer: COMMERCIAL

## 2025-06-04 VITALS
BODY MASS INDEX: 27.94 KG/M2 | HEART RATE: 65 BPM | SYSTOLIC BLOOD PRESSURE: 112 MMHG | DIASTOLIC BLOOD PRESSURE: 73 MMHG | WEIGHT: 171.8 LBS

## 2025-06-04 DIAGNOSIS — Z00.00 ANNUAL WELLNESS VISIT: Primary | ICD-10-CM

## 2025-06-04 DIAGNOSIS — R68.82 LOW LIBIDO: ICD-10-CM

## 2025-06-04 DIAGNOSIS — Z00.00 ANNUAL WELLNESS VISIT: ICD-10-CM

## 2025-06-04 LAB — TSH SERPL DL<=0.005 MIU/L-ACNC: 1.34 UIU/ML (ref 0.3–4.2)

## 2025-06-04 PROCEDURE — 99213 OFFICE O/P EST LOW 20 MIN: CPT | Performed by: ADVANCED PRACTICE MIDWIFE

## 2025-06-04 PROCEDURE — 84443 ASSAY THYROID STIM HORMONE: CPT

## 2025-06-04 PROCEDURE — 87491 CHLMYD TRACH DNA AMP PROBE: CPT

## 2025-06-04 PROCEDURE — 84403 ASSAY OF TOTAL TESTOSTERONE: CPT

## 2025-06-04 PROCEDURE — 87591 N.GONORRHOEAE DNA AMP PROB: CPT

## 2025-06-04 PROCEDURE — 36415 COLL VENOUS BLD VENIPUNCTURE: CPT

## 2025-06-04 NOTE — PROGRESS NOTES
Progress Note    SUBJECTIVE:  Lashonda Rodriguez is an 45 year old, , who requests an Annual Preventive Exam.       Concerns today include: needs mammo  Would like GC/chlam today  Up to date on immunizations  Using an online service to access testosterone therapy.      Menstrual History:      2023     9:00 AM 2023     8:00 AM   Menstrual History   LAST MENSTRUAL PERIOD 2023       Last    Lab Results   Component Value Date    PAP NIL 10/08/2020     History of abnormal Pap smear: No - age 30- 64 PAP with HPV every 5 years recommended    Last   Lab Results   Component Value Date    HPV16 Negative 2023    HPV16 Negative 10/08/2020     Last   Lab Results   Component Value Date    HPV18 Negative 2023    HPV18 Negative 10/08/2020     Last   Lab Results   Component Value Date    HRHPV Negative 2023    HRHPV Negative 10/08/2020       Mammogram current: no (patient will schedule)  Last Mammogram:   No results found.     Last Colonoscopy:  No results found for this or any previous visit.      HISTORY:  Prescription Medications as of 2025         Rx Number Disp Refills Start End Last Dispensed Date Next Fill Date Owning Pharmacy    amphetamine-dextroamphetamine (ADDERALL) 20 MG tablet  90 tablet 0 2025 --   [place], [place] - [address]    Sig: Take two tabs by mouth every morning and one tab at noon    Class: E-Prescribe    Earliest Fill Date: 2025    No prior authorization was found for this prescription.    Found prior authorization for another prescription for the same medication: Approved    amphetamine-dextroamphetamine (ADDERALL) 20 MG tablet  90 tablet 0 2025 --   [place], [place] - [address]    Sig: Take two tabs by mouth every morning and one tab at noon    Class: E-Prescribe    Earliest Fill Date: 2025    No prior authorization was found for this prescription.    Found  prior authorization for another prescription for the same medication: Approved    amphetamine-dextroamphetamine (ADDERALL) 20 MG tablet  90 tablet 0 6/11/2025 --   [facility] [address]    Sig: TAKE TWO TABLETS BY MOUTH EVERY MORNING AND ONE TABLET AT NOON    Class: E-Prescribe    Earliest Fill Date: 6/11/2025    No prior authorization was found for this prescription.    Found prior authorization for another prescription for the same medication: Approved    azelaic acid (FINACIA) 15 % external gel  50 g 2 1/25/2024 --   Capsule -- [address]    Sig: Apply topically once daily    Class: E-Prescribe    Earliest Fill Date: 1/25/2024    caffeine (NO-DOZE) 200 MG TABS  150 tablet -- 1/27/2014 --       Sig: Take 1 tablet (200 mg) by mouth 2 times daily    Class: No Print Out    Route: Oral    estradiol (ESTRACE) 0.1 MG/GM vaginal cream  42.5 g 3 4/4/2024 --   Capsule -- [address]    Sig: Place 1 g vaginally twice a week    Class: E-Prescribe    Route: Vaginal    fluocin-hydroquinone-tretinoin (TRI-ALLEN) 0.01-4-0.05 % CREA  30 g 1 2/22/2024 --   Capsule -- [address]    Sig: Apply twice daily to the face. For additional refills, please schedule a follow-up appointment.    Class: E-Prescribe    Earliest Fill Date: 2/22/2024    melatonin 5 MG tablet  -- --  --       Sig: Take 10 mg by mouth nightly as needed     Class: Historical    Route: Oral    Multiple Vitamins-Minerals (MULTIVITAMIN ADULT PO)  -- --  --   [id number] [place] [facility] [address]    Sig: Take by mouth daily     Class: Historical    Route: Oral    propranolol (INDERAL) 10 MG tablet  30 tablet 3 7/20/2023 --   Capsule -- [address]    Sig: Take 1-2 tablets (10-20 mg) by mouth daily as needed  (performance anxiety)    Class: E-Prescribe    Route: Oral          Allergies   Allergen Reactions    Wellbutrin [Bupropion] Other (See Comments)     Two seizures while on Adderall and Wellbutrin    Pcn [Penicillins] Rash     Immunization History   Administered Date(s) Administered    COVID-19 12+ (MODERNA) 2024    COVID-19 12+ (Pfizer) 2023    COVID-19 MONOVALENT 12+ (Pfizer) 2021, 2021, 10/29/2021    HPV9 (Gardasil) 2022, 2023, 2023    Hepatitis A/B (Twinrix) 2013, 10/31/2013    INFLUENZA,TRIVALENT (FLUCELVAX) 2024    Influenza (H1N1) 2010    Influenza (IIV3) PF 10/20/2004, 2008, 2012, 10/31/2013    Influenza Intranasal Vaccine 2016    Influenza Vaccine (Flucelvax Quadrivalent) 2021, 2022    Influenza Vaccine >6 months,quad, PF 2015, 10/25/2016, 09/15/2020, 2023    Influenza Vaccine, 6+MO IM (QUADRIVALENT W/PRESERVATIVES) 2019    Influenza,INJ,MDCK,PF,Quad >6mo(Flucelvax) 09/15/2020    Nasal Influenza Vaccine 2-49 (FluMist) 2016    TDAP (Adacel,Boostrix) 2012, 2023    Td (Adult), Adsorbed 1996, 2005       OB History    Para Term  AB Living   4 2 2 0 2 2   SAB IAB Ectopic Multiple Live Births   0 0 2 0 2     Past Medical History:   Diagnosis Date    Anxiety     Bladder spasms     Depression 2009    Depressive disorder     no meds, stable     Exercise-induced asthma 2009    Since resolved.    Narcolepsy 2009    Seizure (H) 2 approx [id number]    Secondary to Wellbutrin and stimulant use     Past Surgical History:   Procedure Laterality Date    ECTOPIC PREGNANCY SURGERY      GYN SURGERY  ectopic pregnancy    HC TOOTH EXTRACTION W/FORCEP      PHOTOREFRACTIVE KERATECTOMY Bilateral     2021 - Dr. Walden     Family History   Problem Relation Age of Onset    No Known Problems Mother     No Known Problems Father     No Known Problems Sister      Thyroid Disease Cousin     Hereditary Breast and Ovarian Cancer Syndrome Paternal Cousin     Melanoma No family hx of     Skin Cancer No family hx of     Diabetes No family hx of     Hypertension No family hx of     Macular Degeneration No family hx of     Glaucoma No family hx of     Cancer No family hx of      Social History     Socioeconomic History    Marital status:      Spouse name: None    Number of children: None    Years of education: None    Highest education level: None   Tobacco Use    Smoking status: Former     Current packs/day: 0.00     Types: Cigarettes     Quit date: 2008     Years since quittin.4     Passive exposure: Never    Smokeless tobacco: Never   Vaping Use    Vaping status: Never Used   Substance and Sexual Activity    Alcohol use: Yes     Alcohol/week: 4.0 - 5.0 standard drinks of alcohol     Types: 4 - 5 Standard drinks or equivalent per week     Comment: 3 beer every 2 days/week    Drug use: No    Sexual activity: Yes     Partners: Male     Birth control/protection: I.U.D.     Social Drivers of Health     Interpersonal Safety: Low Risk  (1/15/2025)    Interpersonal Safety     Do you feel physically and emotionally safe where you currently live?: Yes     Within the past 12 months, have you been hit, slapped, kicked or otherwise physically hurt by someone?: No     Within the past 12 months, have you been humiliated or emotionally abused in other ways by your partner or ex-partner?: No       ROS      2024     7:54 AM 2024     7:42 AM 2025     8:14 AM   PHQ-9 SCORE   PHQ-9 Total Score MyChart 8 (Mild depression) 1 (Minimal depression) 0   PHQ-9 Total Score 8 1  0        Patient-reported         EXAM:  Blood pressure 112/73, pulse 65, weight 77.9 kg (171 lb 12.8 oz). Body mass index is 27.94 kg/m .  General - pleasant female in no acute distress.  Skin - no suspicious lesions or rashes  EENT-  PERRLA, euthyroid with out palpable nodules  Neck - supple without  lymphadenopathy.  Lungs - clear to auscultation bilaterally.  Heart - regular rate and rhythm without murmur.  Abdomen - soft, nontender, nondistended, no masses or organomegaly noted.  Musculoskeletal - no gross deformities.  Neurological - normal strength, sensation, and mental status.    Breast Exam:    Breast: Without visible skin changes. No dimpling or lesions seen.   Breasts supple, non-tender with palpation, no dominant mass, nodularity, or nipple discharge noted bilaterally. Axillary nodes negative.  Piercing present in both nipples    Pelvic Exam:  deferred, denies concerns today        ASSESSMENT:  (Z00.00) Annual wellness visit  (primary encounter diagnosis)  Comment:   Plan: Testosterone Total, Chlamydia trachomatis PCR,         Neisseria gonorrhoeae PCR, TSH with free T4         reflex            (R68.82) Low libido  Comment:   Plan: Testosterone Total          Encounter Diagnoses   Name Primary?    Annual wellness visit Yes    Low libido           PLAN:   Orders Placed This Encounter   Procedures    Testosterone Total    TSH with free T4 reflex    Chlamydia trachomatis PCR    Neisseria gonorrhoeae PCR   Discussed the providers who are supporting testosterone therapy and labs today.    [facility] to support her management of hormones levels should she start therapy (not as dx or info to start therapy, would be used to compare levels if she is taking hormone)     Additional teaching done at this visit regarding self breast exam and perimenopause.    Return to clinic in one year.  Follow-up as needed.  Carmen Gonzalez, DNP, APRN, CNM, FACNM

## 2025-06-04 NOTE — LETTER
2025       RE: Lashonda Rodriguez  [address] [facility] [address]     Dear Colleague,    Thank you for referring your patient, Lashonda Rodriguez, to the [facility] [place] at [facility]. Please see a copy of my visit note below.        Progress Note    SUBJECTIVE:  Lashonda Rodriguez is an 45 year old, , who requests an Annual Preventive Exam.       Concerns today include: needs mammo  Would like GC/chlam today  Up to date on immunizations  Using an online service to access testosterone therapy.      Menstrual History:      2023     9:00 AM 2023     8:00 AM   Menstrual History   LAST MENSTRUAL PERIOD 2023       Last    Lab Results   Component Value Date    PAP NIL 10/08/2020     History of abnormal Pap smear: No - age 30- 64 PAP with HPV every 5 years recommended    Last   Lab Results   Component Value Date    HPV16 Negative 2023    HPV16 Negative 10/08/2020     Last   Lab Results   Component Value Date    HPV18 Negative 2023    HPV18 Negative 10/08/2020     Last   Lab Results   Component Value Date    HRHPV Negative 2023    HRHPV Negative 10/08/2020       Mammogram current: no (patient will schedule)  Last Mammogram:   No results found.     Last Colonoscopy:  No results found for this or any previous visit.      HISTORY:  Prescription Medications as of 2025         Rx Number Disp Refills Start End Last Dispensed Date Next Fill Date Owning Pharmacy    amphetamine-dextroamphetamine (ADDERALL) 20 MG tablet  90 tablet 0 2025 --   [facility] [place], [place] - [address]    Sig: Take two tabs by mouth every morning and one tab at noon    Class: E-Prescribe    Earliest Fill Date: 2025    No prior authorization was found for this prescription.    Found prior authorization for another prescription for the same medication: Approved     amphetamine-dextroamphetamine (ADDERALL) 20 MG tablet  90 tablet 0 5/14/2025 --   [facility] [address]    Sig: Take two tabs by mouth every morning and one tab at noon    Class: E-Prescribe    Earliest Fill Date: 5/14/2025    No prior authorization was found for this prescription.    Found prior authorization for another prescription for the same medication: Approved    amphetamine-dextroamphetamine (ADDERALL) 20 MG tablet  90 tablet 0 6/11/2025 --   [facility] [address]    Sig: TAKE TWO TABLETS BY MOUTH EVERY MORNING AND ONE TABLET AT NOON    Class: E-Prescribe    Earliest Fill Date: 6/11/2025    No prior authorization was found for this prescription.    Found prior authorization for another prescription for the same medication: Approved    azelaic acid (FINACIA) 15 % external gel  50 g 2 1/25/2024 --   Capsule -- [address]    Sig: Apply topically once daily    Class: E-Prescribe    Earliest Fill Date: 1/25/2024    caffeine (NO-DOZE) 200 MG TABS  150 tablet -- 1/27/2014 --       Sig: Take 1 tablet (200 mg) by mouth 2 times daily    Class: No Print Out    Route: Oral    estradiol (ESTRACE) 0.1 MG/GM vaginal cream  42.5 g 3 4/4/2024 --   Capsule -- [address]    Sig: Place 1 g vaginally twice a week    Class: E-Prescribe    Route: Vaginal    fluocin-hydroquinone-tretinoin (TRI-ALLEN) 0.01-4-0.05 % CREA  30 g 1 2/22/2024 --   Capsule -- [address]    Sig: Apply twice daily to the face. For additional refills, please schedule a follow-up appointment.    Class: E-Prescribe    Earliest Fill Date: 2/22/2024    melatonin 5 MG tablet  -- --  --       Sig: Take 10 mg by mouth nightly as needed     Class: Historical    Route: Oral    Multiple Vitamins-Minerals (MULTIVITAMIN ADULT PO)  --  --  --   [facility] [place] - [address]    Sig: Take by mouth daily     Class: Historical    Route: Oral    propranolol (INDERAL) 10 MG tablet  30 tablet 3 2023 --   Capsule -- [place] [facility] [place] - [address]    Sig: Take 1-2 tablets (10-20 mg) by mouth daily as needed (performance anxiety)    Class: E-Prescribe    Route: Oral          Allergies   Allergen Reactions     Wellbutrin [Bupropion] Other (See Comments)     Two seizures while on Adderall and Wellbutrin     Pcn [Penicillins] Rash     Immunization History   Administered Date(s) Administered     COVID-19 12+ (MODERNA) 2024     COVID-19 12+ (Pfizer) 2023     COVID-19 MONOVALENT 12+ (Pfizer) 2021, 2021, 10/29/2021     HPV9 (Gardasil) 2022, 2023, 2023     Hepatitis A/B (Twinrix) 2013, 10/31/2013     INFLUENZA,TRIVALENT (FLUCELVAX) 2024     Influenza (H1N1) 2010     Influenza (IIV3) PF 10/20/2004, 2008, 2012, 10/31/2013     Influenza Intranasal Vaccine 2016     Influenza Vaccine (Flucelvax Quadrivalent) 2021, 2022     Influenza Vaccine >6 months,quad, PF 2015, 10/25/2016, 09/15/2020, 2023     Influenza Vaccine, 6+MO IM (QUADRIVALENT W/PRESERVATIVES) 2019     Influenza,INJ,MDCK,PF,Quad >6mo(Flucelvax) 09/15/2020     Nasal Influenza Vaccine 2-49 (FluMist) 2016     TDAP (Adacel,Boostrix) 2012, 2023     Td (Adult), Adsorbed 1996, 2005       OB History    Para Term  AB Living   4 2 2 0 2 2   SAB IAB Ectopic Multiple Live Births   0 0 2 0 2     Past Medical History:   Diagnosis Date     Anxiety      Bladder spasms      Depression 2009     Depressive disorder     no meds, stable      Exercise-induced asthma 2009    Since resolved.     Narcolepsy 2009     Seizure (H) 2 approx [id number]    Secondary to Wellbutrin and stimulant use     Past  Surgical History:   Procedure Laterality Date     ECTOPIC PREGNANCY SURGERY       GYN SURGERY  ectopic pregnancy     HC TOOTH EXTRACTION W/FORCEP       PHOTOREFRACTIVE KERATECTOMY Bilateral     2021 - Dr. Walden     Family History   Problem Relation Age of Onset     No Known Problems Mother      No Known Problems Father      No Known Problems Sister      Thyroid Disease Cousin      Hereditary Breast and Ovarian Cancer Syndrome Paternal Cousin      Melanoma No family hx of      Skin Cancer No family hx of      Diabetes No family hx of      Hypertension No family hx of      Macular Degeneration No family hx of      Glaucoma No family hx of      Cancer No family hx of      Social History     Socioeconomic History     Marital status:      Spouse name: None     Number of children: None     Years of education: None     Highest education level: None   Tobacco Use     Smoking status: Former     Current packs/day: 0.00     Types: Cigarettes     Quit date: 2008     Years since quittin.4     Passive exposure: Never     Smokeless tobacco: Never   Vaping Use     Vaping status: Never Used   Substance and Sexual Activity     Alcohol use: Yes     Alcohol/week: 4.0 - 5.0 standard drinks of alcohol     Types: 4 - 5 Standard drinks or equivalent per week     Comment: 3 beer every 2 days/week     Drug use: No     Sexual activity: Yes     Partners: Male     Birth control/protection: I.U.D.     Social Drivers of Health     Interpersonal Safety: Low Risk  (1/15/2025)    Interpersonal Safety      Do you feel physically and emotionally safe where you currently live?: Yes      Within the past 12 months, have you been hit, slapped, kicked or otherwise physically hurt by someone?: No      Within the past 12 months, have you been humiliated or emotionally abused in other ways by your partner or ex-partner?: No       ROS      2024     7:54 AM 2024     7:42 AM 2025     8:14 AM   PHQ-9 SCORE   PHQ-9 Total  Score MyChart 8 (Mild depression) 1 (Minimal depression) 0   PHQ-9 Total Score 8 1  0        Patient-reported         EXAM:  Blood pressure 112/73, pulse 65, weight 77.9 kg (171 lb 12.8 oz). Body mass index is 27.94 kg/m .  General - pleasant female in no acute distress.  Skin - no suspicious lesions or rashes  EENT-  PERRLA, euthyroid with out palpable nodules  Neck - supple without lymphadenopathy.  Lungs - clear to auscultation bilaterally.  Heart - regular rate and rhythm without murmur.  Abdomen - soft, nontender, nondistended, no masses or organomegaly noted.  Musculoskeletal - no gross deformities.  Neurological - normal strength, sensation, and mental status.    Breast Exam:    Breast: Without visible skin changes. No dimpling or lesions seen.   Breasts supple, non-tender with palpation, no dominant mass, nodularity, or nipple discharge noted bilaterally. Axillary nodes negative.  Piercing present in both nipples    Pelvic Exam:  deferred, denies concerns today        ASSESSMENT:  (Z00.00) Annual wellness visit  (primary encounter diagnosis)  Comment:   Plan: Testosterone Total, Chlamydia trachomatis PCR,         Neisseria gonorrhoeae PCR, TSH with free T4         reflex            (R68.82) Low libido  Comment:   Plan: Testosterone Total          Encounter Diagnoses   Name Primary?     Annual wellness visit Yes     Low libido           PLAN:   Orders Placed This Encounter   Procedures     Testosterone Total     TSH with free T4 reflex     Chlamydia trachomatis PCR     Neisseria gonorrhoeae PCR   Discussed the providers who are supporting testosterone therapy and labs today.    Gave ROSALIE testosterone statement, lab today to support her management of hormones levels should she start therapy (not as dx or info to start therapy, would be used to compare levels if she is taking hormone)     Additional teaching done at this visit regarding self breast exam and perimenopause.    Return to clinic in one year.   Follow-up as needed.  Carmen Gonzalez, MARCO, APRN, CNM, FACNM              Again, thank you for allowing me to participate in the care of your patient.      Sincerely,    AYSE Espinoza CNM

## 2025-06-05 LAB
C TRACH DNA SPEC QL NAA+PROBE: NEGATIVE
N GONORRHOEA DNA SPEC QL NAA+PROBE: NEGATIVE
SPECIMEN TYPE: NORMAL
SPECIMEN TYPE: NORMAL

## 2025-06-06 ENCOUNTER — RESULTS FOLLOW-UP (OUTPATIENT)
Dept: OBGYN | Facility: CLINIC | Age: 45
End: 2025-06-06

## 2025-06-08 LAB — TESTOST SERPL-MCNC: 19 NG/DL (ref 8–60)

## 2025-06-09 ENCOUNTER — MYC MEDICAL ADVICE (OUTPATIENT)
Dept: OBGYN | Facility: CLINIC | Age: 45
End: 2025-06-09
Payer: COMMERCIAL

## 2025-06-16 NOTE — TELEPHONE ENCOUNTER
TAMELA requesting cycle days from patient, sent her a [employer] message requesting her cycle days in comparison to testing.

## 2025-08-05 ENCOUNTER — MYC REFILL (OUTPATIENT)
Dept: FAMILY MEDICINE | Facility: CLINIC | Age: 45
End: 2025-08-05

## 2025-08-05 DIAGNOSIS — N89.8 VAGINAL DRYNESS: ICD-10-CM

## 2025-08-05 DIAGNOSIS — F41.8 PERFORMANCE ANXIETY: ICD-10-CM

## 2025-08-07 DIAGNOSIS — F41.8 PERFORMANCE ANXIETY: ICD-10-CM

## 2025-08-08 RX ORDER — PROPRANOLOL HYDROCHLORIDE 10 MG/1
10-20 TABLET ORAL DAILY PRN
Qty: 30 TABLET | Refills: 3 | OUTPATIENT
Start: 2025-08-08

## 2025-08-13 RX ORDER — ESTRADIOL 0.1 MG/G
1 CREAM VAGINAL
Qty: 42.5 G | Refills: 3 | Status: SHIPPED | OUTPATIENT
Start: 2025-08-14

## 2025-08-13 RX ORDER — PROPRANOLOL HYDROCHLORIDE 10 MG/1
10-20 TABLET ORAL DAILY PRN
Qty: 30 TABLET | Refills: 3 | Status: SHIPPED | OUTPATIENT
Start: 2025-08-13

## 2025-08-31 ENCOUNTER — E-VISIT (OUTPATIENT)
Dept: URGENT CARE | Facility: CLINIC | Age: 45
End: 2025-08-31
Payer: COMMERCIAL

## 2025-08-31 DIAGNOSIS — R30.0 DYSURIA: Primary | ICD-10-CM

## 2025-08-31 RX ORDER — NITROFURANTOIN 25; 75 MG/1; MG/1
100 CAPSULE ORAL 2 TIMES DAILY
Qty: 14 CAPSULE | Refills: 0 | Status: SHIPPED | OUTPATIENT
Start: 2025-08-31 | End: 2025-09-07